# Patient Record
Sex: FEMALE | Race: WHITE | Employment: OTHER | URBAN - NONMETROPOLITAN AREA
[De-identification: names, ages, dates, MRNs, and addresses within clinical notes are randomized per-mention and may not be internally consistent; named-entity substitution may affect disease eponyms.]

---

## 2017-01-11 ENCOUNTER — OFFICE VISIT (OUTPATIENT)
Dept: ORTHOPEDIC SURGERY | Age: 77
End: 2017-01-11

## 2017-01-11 VITALS — WEIGHT: 124 LBS | BODY MASS INDEX: 21.97 KG/M2 | HEIGHT: 63 IN

## 2017-01-11 DIAGNOSIS — M17.0 PRIMARY OSTEOARTHRITIS OF BOTH KNEES: Primary | ICD-10-CM

## 2017-01-11 PROCEDURE — 99213 OFFICE O/P EST LOW 20 MIN: CPT | Performed by: ORTHOPAEDIC SURGERY

## 2017-01-16 ENCOUNTER — TELEPHONE (OUTPATIENT)
Dept: CARDIOLOGY CLINIC | Age: 77
End: 2017-01-16

## 2017-01-26 ENCOUNTER — TELEPHONE (OUTPATIENT)
Dept: CARDIOLOGY CLINIC | Age: 77
End: 2017-01-26

## 2017-02-08 ENCOUNTER — OFFICE VISIT (OUTPATIENT)
Dept: ORTHOPEDIC SURGERY | Age: 77
End: 2017-02-08

## 2017-02-08 VITALS — HEIGHT: 63 IN | BODY MASS INDEX: 22.32 KG/M2 | WEIGHT: 126 LBS

## 2017-02-08 DIAGNOSIS — M17.12 PRIMARY OSTEOARTHRITIS OF LEFT KNEE: Primary | ICD-10-CM

## 2017-02-08 PROCEDURE — 20610 DRAIN/INJ JOINT/BURSA W/O US: CPT | Performed by: ORTHOPAEDIC SURGERY

## 2017-02-08 PROCEDURE — 99213 OFFICE O/P EST LOW 20 MIN: CPT | Performed by: ORTHOPAEDIC SURGERY

## 2017-02-08 PROCEDURE — 1090F PRES/ABSN URINE INCON ASSESS: CPT | Performed by: ORTHOPAEDIC SURGERY

## 2017-02-08 PROCEDURE — 1036F TOBACCO NON-USER: CPT | Performed by: ORTHOPAEDIC SURGERY

## 2017-02-08 PROCEDURE — G8419 CALC BMI OUT NRM PARAM NOF/U: HCPCS | Performed by: ORTHOPAEDIC SURGERY

## 2017-02-08 PROCEDURE — G8427 DOCREV CUR MEDS BY ELIG CLIN: HCPCS | Performed by: ORTHOPAEDIC SURGERY

## 2017-02-08 PROCEDURE — 4040F PNEUMOC VAC/ADMIN/RCVD: CPT | Performed by: ORTHOPAEDIC SURGERY

## 2017-02-08 PROCEDURE — 1123F ACP DISCUSS/DSCN MKR DOCD: CPT | Performed by: ORTHOPAEDIC SURGERY

## 2017-02-08 PROCEDURE — G8484 FLU IMMUNIZE NO ADMIN: HCPCS | Performed by: ORTHOPAEDIC SURGERY

## 2017-02-08 PROCEDURE — G8400 PT W/DXA NO RESULTS DOC: HCPCS | Performed by: ORTHOPAEDIC SURGERY

## 2017-02-09 ENCOUNTER — TELEPHONE (OUTPATIENT)
Dept: ORTHOPEDIC SURGERY | Age: 77
End: 2017-02-09

## 2017-03-03 ENCOUNTER — OFFICE VISIT (OUTPATIENT)
Dept: CARDIOLOGY CLINIC | Age: 77
End: 2017-03-03

## 2017-03-03 VITALS
HEART RATE: 80 BPM | BODY MASS INDEX: 22.68 KG/M2 | SYSTOLIC BLOOD PRESSURE: 134 MMHG | HEIGHT: 63 IN | WEIGHT: 128 LBS | DIASTOLIC BLOOD PRESSURE: 70 MMHG

## 2017-03-03 DIAGNOSIS — Q21.10 ASD (ATRIAL SEPTAL DEFECT): ICD-10-CM

## 2017-03-03 DIAGNOSIS — D68.51 FACTOR V LEIDEN (HCC): ICD-10-CM

## 2017-03-03 DIAGNOSIS — E78.5 HYPERLIPIDEMIA, UNSPECIFIED HYPERLIPIDEMIA TYPE: Primary | Chronic | ICD-10-CM

## 2017-03-03 DIAGNOSIS — G45.1 HEMISPHERIC CAROTID ARTERY SYNDROME: ICD-10-CM

## 2017-03-03 PROCEDURE — 4040F PNEUMOC VAC/ADMIN/RCVD: CPT | Performed by: INTERNAL MEDICINE

## 2017-03-03 PROCEDURE — 1090F PRES/ABSN URINE INCON ASSESS: CPT | Performed by: INTERNAL MEDICINE

## 2017-03-03 PROCEDURE — G8484 FLU IMMUNIZE NO ADMIN: HCPCS | Performed by: INTERNAL MEDICINE

## 2017-03-03 PROCEDURE — 1123F ACP DISCUSS/DSCN MKR DOCD: CPT | Performed by: INTERNAL MEDICINE

## 2017-03-03 PROCEDURE — G8427 DOCREV CUR MEDS BY ELIG CLIN: HCPCS | Performed by: INTERNAL MEDICINE

## 2017-03-03 PROCEDURE — G8419 CALC BMI OUT NRM PARAM NOF/U: HCPCS | Performed by: INTERNAL MEDICINE

## 2017-03-03 PROCEDURE — 99214 OFFICE O/P EST MOD 30 MIN: CPT | Performed by: INTERNAL MEDICINE

## 2017-03-03 PROCEDURE — G8400 PT W/DXA NO RESULTS DOC: HCPCS | Performed by: INTERNAL MEDICINE

## 2017-03-03 PROCEDURE — 1036F TOBACCO NON-USER: CPT | Performed by: INTERNAL MEDICINE

## 2017-03-03 RX ORDER — CHOLECALCIFEROL (VITAMIN D3) 125 MCG
2.5 CAPSULE ORAL DAILY
COMMUNITY
End: 2017-05-26

## 2017-03-13 ENCOUNTER — TELEPHONE (OUTPATIENT)
Dept: CARDIOLOGY CLINIC | Age: 77
End: 2017-03-13

## 2017-03-24 ENCOUNTER — TELEPHONE (OUTPATIENT)
Dept: CARDIOLOGY CLINIC | Age: 77
End: 2017-03-24

## 2017-04-19 ENCOUNTER — OFFICE VISIT (OUTPATIENT)
Dept: ORTHOPEDIC SURGERY | Age: 77
End: 2017-04-19

## 2017-04-19 VITALS — BODY MASS INDEX: 22.68 KG/M2 | HEIGHT: 63 IN | WEIGHT: 128 LBS

## 2017-04-19 DIAGNOSIS — M25.561 ACUTE PAIN OF RIGHT KNEE: Primary | ICD-10-CM

## 2017-04-19 DIAGNOSIS — M17.0 PRIMARY OSTEOARTHRITIS OF BOTH KNEES: ICD-10-CM

## 2017-04-19 PROCEDURE — 1036F TOBACCO NON-USER: CPT | Performed by: ORTHOPAEDIC SURGERY

## 2017-04-19 PROCEDURE — 4040F PNEUMOC VAC/ADMIN/RCVD: CPT | Performed by: ORTHOPAEDIC SURGERY

## 2017-04-19 PROCEDURE — G8427 DOCREV CUR MEDS BY ELIG CLIN: HCPCS | Performed by: ORTHOPAEDIC SURGERY

## 2017-04-19 PROCEDURE — G8419 CALC BMI OUT NRM PARAM NOF/U: HCPCS | Performed by: ORTHOPAEDIC SURGERY

## 2017-04-19 PROCEDURE — 1090F PRES/ABSN URINE INCON ASSESS: CPT | Performed by: ORTHOPAEDIC SURGERY

## 2017-04-19 PROCEDURE — 99213 OFFICE O/P EST LOW 20 MIN: CPT | Performed by: ORTHOPAEDIC SURGERY

## 2017-04-19 PROCEDURE — 1123F ACP DISCUSS/DSCN MKR DOCD: CPT | Performed by: ORTHOPAEDIC SURGERY

## 2017-04-19 PROCEDURE — 20610 DRAIN/INJ JOINT/BURSA W/O US: CPT | Performed by: ORTHOPAEDIC SURGERY

## 2017-04-19 PROCEDURE — G8400 PT W/DXA NO RESULTS DOC: HCPCS | Performed by: ORTHOPAEDIC SURGERY

## 2017-04-26 ENCOUNTER — OFFICE VISIT (OUTPATIENT)
Dept: ORTHOPEDIC SURGERY | Age: 77
End: 2017-04-26

## 2017-04-26 DIAGNOSIS — M79.671 RIGHT FOOT PAIN: Primary | ICD-10-CM

## 2017-04-26 PROCEDURE — 99214 OFFICE O/P EST MOD 30 MIN: CPT | Performed by: ORTHOPAEDIC SURGERY

## 2017-04-26 PROCEDURE — 1090F PRES/ABSN URINE INCON ASSESS: CPT | Performed by: ORTHOPAEDIC SURGERY

## 2017-04-26 PROCEDURE — 73630 X-RAY EXAM OF FOOT: CPT | Performed by: ORTHOPAEDIC SURGERY

## 2017-04-26 PROCEDURE — 4040F PNEUMOC VAC/ADMIN/RCVD: CPT | Performed by: ORTHOPAEDIC SURGERY

## 2017-04-26 PROCEDURE — 1123F ACP DISCUSS/DSCN MKR DOCD: CPT | Performed by: ORTHOPAEDIC SURGERY

## 2017-04-26 PROCEDURE — G8427 DOCREV CUR MEDS BY ELIG CLIN: HCPCS | Performed by: ORTHOPAEDIC SURGERY

## 2017-04-26 PROCEDURE — G8400 PT W/DXA NO RESULTS DOC: HCPCS | Performed by: ORTHOPAEDIC SURGERY

## 2017-04-26 PROCEDURE — 1036F TOBACCO NON-USER: CPT | Performed by: ORTHOPAEDIC SURGERY

## 2017-04-26 PROCEDURE — G8419 CALC BMI OUT NRM PARAM NOF/U: HCPCS | Performed by: ORTHOPAEDIC SURGERY

## 2017-04-26 RX ORDER — TRAZODONE HYDROCHLORIDE 50 MG/1
100 TABLET ORAL NIGHTLY
COMMUNITY
Start: 2017-04-24

## 2017-04-26 RX ORDER — ZOLPIDEM TARTRATE 5 MG/1
TABLET ORAL
COMMUNITY
Start: 2017-03-24 | End: 2017-05-26 | Stop reason: HOSPADM

## 2017-04-26 RX ORDER — CITALOPRAM 20 MG/1
TABLET ORAL
COMMUNITY
Start: 2017-02-10 | End: 2017-05-26 | Stop reason: HOSPADM

## 2017-05-17 ENCOUNTER — OFFICE VISIT (OUTPATIENT)
Dept: ORTHOPEDIC SURGERY | Age: 77
End: 2017-05-17

## 2017-05-17 VITALS — BODY MASS INDEX: 22.68 KG/M2 | WEIGHT: 128 LBS | HEIGHT: 63 IN

## 2017-05-17 DIAGNOSIS — M17.0 PRIMARY OSTEOARTHRITIS OF BOTH KNEES: Primary | ICD-10-CM

## 2017-05-17 PROCEDURE — 99213 OFFICE O/P EST LOW 20 MIN: CPT | Performed by: ORTHOPAEDIC SURGERY

## 2017-05-17 PROCEDURE — G8400 PT W/DXA NO RESULTS DOC: HCPCS | Performed by: ORTHOPAEDIC SURGERY

## 2017-05-17 PROCEDURE — 1123F ACP DISCUSS/DSCN MKR DOCD: CPT | Performed by: ORTHOPAEDIC SURGERY

## 2017-05-17 PROCEDURE — G8427 DOCREV CUR MEDS BY ELIG CLIN: HCPCS | Performed by: ORTHOPAEDIC SURGERY

## 2017-05-17 PROCEDURE — G8419 CALC BMI OUT NRM PARAM NOF/U: HCPCS | Performed by: ORTHOPAEDIC SURGERY

## 2017-05-17 PROCEDURE — 4040F PNEUMOC VAC/ADMIN/RCVD: CPT | Performed by: ORTHOPAEDIC SURGERY

## 2017-05-17 PROCEDURE — 1036F TOBACCO NON-USER: CPT | Performed by: ORTHOPAEDIC SURGERY

## 2017-05-17 PROCEDURE — 1090F PRES/ABSN URINE INCON ASSESS: CPT | Performed by: ORTHOPAEDIC SURGERY

## 2017-05-30 ENCOUNTER — OFFICE VISIT (OUTPATIENT)
Dept: CARDIOLOGY CLINIC | Age: 77
End: 2017-05-30

## 2017-05-30 VITALS
HEART RATE: 64 BPM | HEIGHT: 64 IN | BODY MASS INDEX: 21.51 KG/M2 | DIASTOLIC BLOOD PRESSURE: 70 MMHG | WEIGHT: 126 LBS | SYSTOLIC BLOOD PRESSURE: 118 MMHG

## 2017-05-30 DIAGNOSIS — G45.1 HEMISPHERIC CAROTID ARTERY SYNDROME: ICD-10-CM

## 2017-05-30 DIAGNOSIS — D68.51 FACTOR V LEIDEN (HCC): ICD-10-CM

## 2017-05-30 DIAGNOSIS — E78.5 HYPERLIPIDEMIA, UNSPECIFIED HYPERLIPIDEMIA TYPE: Primary | Chronic | ICD-10-CM

## 2017-05-30 PROCEDURE — G8427 DOCREV CUR MEDS BY ELIG CLIN: HCPCS | Performed by: INTERNAL MEDICINE

## 2017-05-30 PROCEDURE — 99214 OFFICE O/P EST MOD 30 MIN: CPT | Performed by: INTERNAL MEDICINE

## 2017-05-30 PROCEDURE — 4040F PNEUMOC VAC/ADMIN/RCVD: CPT | Performed by: INTERNAL MEDICINE

## 2017-05-30 PROCEDURE — 1123F ACP DISCUSS/DSCN MKR DOCD: CPT | Performed by: INTERNAL MEDICINE

## 2017-05-30 PROCEDURE — G8400 PT W/DXA NO RESULTS DOC: HCPCS | Performed by: INTERNAL MEDICINE

## 2017-05-30 PROCEDURE — G8598 ASA/ANTIPLAT THER USED: HCPCS | Performed by: INTERNAL MEDICINE

## 2017-05-30 PROCEDURE — G8419 CALC BMI OUT NRM PARAM NOF/U: HCPCS | Performed by: INTERNAL MEDICINE

## 2017-05-30 PROCEDURE — 1090F PRES/ABSN URINE INCON ASSESS: CPT | Performed by: INTERNAL MEDICINE

## 2017-05-30 PROCEDURE — 1036F TOBACCO NON-USER: CPT | Performed by: INTERNAL MEDICINE

## 2017-05-31 ENCOUNTER — OFFICE VISIT (OUTPATIENT)
Dept: ORTHOPEDIC SURGERY | Age: 77
End: 2017-05-31

## 2017-05-31 VITALS — WEIGHT: 126 LBS | HEIGHT: 64 IN | BODY MASS INDEX: 21.51 KG/M2

## 2017-05-31 DIAGNOSIS — M17.11 PRIMARY OSTEOARTHRITIS OF RIGHT KNEE: Primary | ICD-10-CM

## 2017-05-31 PROCEDURE — 4040F PNEUMOC VAC/ADMIN/RCVD: CPT | Performed by: ORTHOPAEDIC SURGERY

## 2017-05-31 PROCEDURE — G8598 ASA/ANTIPLAT THER USED: HCPCS | Performed by: ORTHOPAEDIC SURGERY

## 2017-05-31 PROCEDURE — 20610 DRAIN/INJ JOINT/BURSA W/O US: CPT | Performed by: ORTHOPAEDIC SURGERY

## 2017-05-31 PROCEDURE — G8427 DOCREV CUR MEDS BY ELIG CLIN: HCPCS | Performed by: ORTHOPAEDIC SURGERY

## 2017-05-31 PROCEDURE — 1123F ACP DISCUSS/DSCN MKR DOCD: CPT | Performed by: ORTHOPAEDIC SURGERY

## 2017-05-31 PROCEDURE — 99214 OFFICE O/P EST MOD 30 MIN: CPT | Performed by: ORTHOPAEDIC SURGERY

## 2017-05-31 PROCEDURE — 1036F TOBACCO NON-USER: CPT | Performed by: ORTHOPAEDIC SURGERY

## 2017-05-31 PROCEDURE — G8400 PT W/DXA NO RESULTS DOC: HCPCS | Performed by: ORTHOPAEDIC SURGERY

## 2017-05-31 PROCEDURE — G8419 CALC BMI OUT NRM PARAM NOF/U: HCPCS | Performed by: ORTHOPAEDIC SURGERY

## 2017-05-31 PROCEDURE — 1090F PRES/ABSN URINE INCON ASSESS: CPT | Performed by: ORTHOPAEDIC SURGERY

## 2017-06-06 ENCOUNTER — HOSPITAL ENCOUNTER (OUTPATIENT)
Dept: VASCULAR LAB | Age: 77
Discharge: HOME | End: 2017-06-06
Attending: INTERNAL MEDICINE | Admitting: INTERNAL MEDICINE

## 2017-06-06 DIAGNOSIS — G45.1 HEMISPHERIC CAROTID ARTERY SYNDROME: ICD-10-CM

## 2017-06-06 DIAGNOSIS — G45.1 CAROTID ARTERY SYNDROME HEMISPHERIC: ICD-10-CM

## 2017-06-14 ENCOUNTER — TELEPHONE (OUTPATIENT)
Dept: CARDIOLOGY CLINIC | Age: 77
End: 2017-06-14

## 2017-08-16 ENCOUNTER — OFFICE VISIT (OUTPATIENT)
Dept: ORTHOPEDIC SURGERY | Age: 77
End: 2017-08-16

## 2017-08-16 VITALS — WEIGHT: 126 LBS | BODY MASS INDEX: 21.51 KG/M2 | HEIGHT: 64 IN

## 2017-08-16 DIAGNOSIS — G89.29 CHRONIC PAIN OF RIGHT KNEE: Primary | ICD-10-CM

## 2017-08-16 DIAGNOSIS — M25.561 CHRONIC PAIN OF RIGHT KNEE: Primary | ICD-10-CM

## 2017-08-16 PROCEDURE — G8598 ASA/ANTIPLAT THER USED: HCPCS | Performed by: ORTHOPAEDIC SURGERY

## 2017-08-16 PROCEDURE — 1123F ACP DISCUSS/DSCN MKR DOCD: CPT | Performed by: ORTHOPAEDIC SURGERY

## 2017-08-16 PROCEDURE — G8400 PT W/DXA NO RESULTS DOC: HCPCS | Performed by: ORTHOPAEDIC SURGERY

## 2017-08-16 PROCEDURE — 1090F PRES/ABSN URINE INCON ASSESS: CPT | Performed by: ORTHOPAEDIC SURGERY

## 2017-08-16 PROCEDURE — 1036F TOBACCO NON-USER: CPT | Performed by: ORTHOPAEDIC SURGERY

## 2017-08-16 PROCEDURE — G8427 DOCREV CUR MEDS BY ELIG CLIN: HCPCS | Performed by: ORTHOPAEDIC SURGERY

## 2017-08-16 PROCEDURE — 4040F PNEUMOC VAC/ADMIN/RCVD: CPT | Performed by: ORTHOPAEDIC SURGERY

## 2017-08-16 PROCEDURE — G8420 CALC BMI NORM PARAMETERS: HCPCS | Performed by: ORTHOPAEDIC SURGERY

## 2017-08-16 PROCEDURE — 99214 OFFICE O/P EST MOD 30 MIN: CPT | Performed by: ORTHOPAEDIC SURGERY

## 2017-08-16 PROCEDURE — 20610 DRAIN/INJ JOINT/BURSA W/O US: CPT | Performed by: ORTHOPAEDIC SURGERY

## 2017-10-04 ENCOUNTER — OFFICE VISIT (OUTPATIENT)
Dept: ORTHOPEDIC SURGERY | Age: 77
End: 2017-10-04

## 2017-10-04 VITALS — HEIGHT: 64 IN | BODY MASS INDEX: 21.51 KG/M2 | WEIGHT: 126 LBS

## 2017-10-04 DIAGNOSIS — M17.11 PRIMARY OSTEOARTHRITIS OF RIGHT KNEE: Primary | ICD-10-CM

## 2017-10-04 PROCEDURE — G8598 ASA/ANTIPLAT THER USED: HCPCS | Performed by: ORTHOPAEDIC SURGERY

## 2017-10-04 PROCEDURE — 1036F TOBACCO NON-USER: CPT | Performed by: ORTHOPAEDIC SURGERY

## 2017-10-04 PROCEDURE — G8400 PT W/DXA NO RESULTS DOC: HCPCS | Performed by: ORTHOPAEDIC SURGERY

## 2017-10-04 PROCEDURE — 4040F PNEUMOC VAC/ADMIN/RCVD: CPT | Performed by: ORTHOPAEDIC SURGERY

## 2017-10-04 PROCEDURE — G8420 CALC BMI NORM PARAMETERS: HCPCS | Performed by: ORTHOPAEDIC SURGERY

## 2017-10-04 PROCEDURE — 20610 DRAIN/INJ JOINT/BURSA W/O US: CPT | Performed by: ORTHOPAEDIC SURGERY

## 2017-10-04 PROCEDURE — 99214 OFFICE O/P EST MOD 30 MIN: CPT | Performed by: ORTHOPAEDIC SURGERY

## 2017-10-04 PROCEDURE — 1090F PRES/ABSN URINE INCON ASSESS: CPT | Performed by: ORTHOPAEDIC SURGERY

## 2017-10-04 PROCEDURE — G8427 DOCREV CUR MEDS BY ELIG CLIN: HCPCS | Performed by: ORTHOPAEDIC SURGERY

## 2017-10-04 PROCEDURE — 1123F ACP DISCUSS/DSCN MKR DOCD: CPT | Performed by: ORTHOPAEDIC SURGERY

## 2017-10-04 PROCEDURE — G8484 FLU IMMUNIZE NO ADMIN: HCPCS | Performed by: ORTHOPAEDIC SURGERY

## 2017-10-04 NOTE — PROGRESS NOTES
joint effusion    Range of Motion:  0 - 140° flexion/ extension   Hip extension to 20 hip flexion to 70+  Lumbar ROM -20 extension flexion to 6 inches from the floor      Strength: Quadriceps testing 5/5 , hamstring muscle testing 5/5, EHL against resistance is 5/5, hip flexor strength is intact 5/5, internal and external rotation of the hip against resistance is also intact 5/5    Special Tests: stable Lachman, negative anterior drawer, negative pivot shift, no posterior sag no posterior drawer does not open to valgus or varus stress at 0 or 30° negative, Steinmann's negative, Radha's negative, Homans negative Dm, pedal pulses are +2/4 capillary refill is brisk sensation is intact ankle exam and hip exam are shows no pain with full range of motion provocative testing of the hip is nonpainful muscle testing around the hip is 5 over 5. Lumbar flexion to 6 inches from floor with out pain      Inspection:      Gait: antalgic     Reflex:    Lower extremity Deep tendon reflexes +2/4 and equal bilaterally for patella and Achilles  Upper extremity reflexes:  of the biceps, triceps, brachioradialis +2/4 equal bilaterally    Contralateral  Knee: Negative Lachman negative anterior drawer negative pivot shift no posterior sag no posterior drawer does not open to valgus or varus stress at 0 or 30° negative Steinmann's negative Radha's negative Homans negative Dm pedal pulses are +2/4 capillary refill is brisk sensation is intact ankle exam and hip exam are shows no pain with full range of motion provocative testing of the hip is nonpainful muscle testing around the hip is 5 over 5. Hip and lumbar testing does not reproduce pain evocative testing does not reproduce symptomatology. Additional Examinations:  Left Lower Extremity: Examination of the left lower extremity does not show any tenderness, deformity or injury. Range of motion is unremarkable. There is no gross instability.   There are no rashes, ulcerations or lesions. Strength and tone are normal.  Thoracic Spine: Examination of the thoracic spine does not show any tenderness, deformity or injury. Range of motion is unremarkable. There is no gross instability. There are no rashes, ulcerations or lesions. Strength and tone are normal.  Lower Back: Examination of the lower back does not show any tenderness, deformity or injury. Range of motion is unremarkable. There is no gross instability. There are no rashes, ulcerations or lesions. Strength and tone are normal.    Past Surgical History:   Procedure Laterality Date    CARPAL TUNNEL RELEASE      right    COLONOSCOPY      COSMETIC SURGERY      eye lids lifted about 10 years ago.  HIP SURGERY Right 4/21/15.  KNEE ARTHROSCOPY Bilateral     OTHER SURGICAL HISTORY Right     Gamma nail right hip    TONSILLECTOMY     . Assessment :  Osteoarthritis right knee    Impression: Osteoarthritis right knee    Office Procedures:I discussed in detail the risks, benefits and complications of an injection which included but are not limited to infection, skin reactions, hot swollen joint, and anaphylaxis with the patient. The patient verbalized understanding and gave informed consent for the injection. The patient's skin was prepped using sterile alcohol solution. A sterile 22-gauge needle was inserted into the right knee and a mixture of 3ml of 6mg/ml Celestone, 7mL of 0.5% Marcaine  was injected under sterile technique. The needle was withdrawn and the puncture site sealed with a Band-Aid. The patient tolerated the injection well. The patient was instructed to call the office immediately if there is any pain, redness, warmth, fever, or chills.   Orders Placed This Encounter   Procedures    IN BETAMETHASONE ACET&SOD PHOSP    IN ARTHROCENTESIS ASPIR&/INJ MAJOR JT/BURSA W/O US       Previous Treatments:  Knee arthroscopy, injections, physical therapy, anti-inflammatories, ice,    Possible

## 2017-10-04 NOTE — MR AVS SNAPSHOT
After Visit Summary             Lakeisha Ayala   10/4/2017 2:30 PM   Office Visit    Description:  Female : 1940   Provider:  Michelle Stratton DO   Department:  [facility]              Your Follow-Up and Future Appointments         Below is a list of your follow-up and future appointments. This may not be a complete list as you may have made appointments directly with providers that we are not aware of or your providers may have made some for you. Please call your providers to confirm appointments. It is important to keep your appointments. Please bring your current insurance card, photo ID, co-pay, and all medication bottles to your appointment. If self-pay, payment is expected at the time of service. Your To-Do List     Future Appointments Provider Department Dept Phone    2017 11:30 AM Tanna Chinchilla MD; Katarzyna Blake [facility] [phone]    2017 1:30 PM Oneyda Lucero MD [facility] Cardiology [facility] [phone]    Please arrive 15 minutes prior to appointment, bring photo ID and insurance card.          Information from Your Visit        Department     Name Address Phone Fax    [address] [phone]      You Were Seen for:         Comments    Primary osteoarthritis of right knee   [[id number]]         Vital Signs     Height Weight Body Mass Index Smoking Status          5' 4\" (1.626 m) 126 lb (57.2 kg) 21.63 kg/m2 Never Smoker           Medications and Orders      Your Current Medications Are              traZODone (DESYREL) 50 MG tablet     rivaroxaban (XARELTO) 20 MG TABS tablet Take 1 tablet by mouth daily    Calcium Carb-Cholecalciferol 500-400 MG-UNIT TABS Take 1 tablet by mouth daily    Multiple Vitamins-Minerals (THERAPEUTIC MULTIVITAMIN-MINERALS) tablet Take 1 tablet by mouth daily    LORazepam (ATIVAN) 0.5 MG tablet Take 0.5 mg by mouth daily alendronate (FOSAMAX) 70 MG tablet Take 70 mg by mouth every 7 days Takes on Saturday    Omega-3 Fatty Acids (FISH OIL) 1200 MG CAPS Take 1 tablet by mouth daily. pravastatin (PRAVACHOL) 80 MG tablet Take 80 mg by mouth daily. Coenzyme Q10-Levocarnitine (CO Q-10 PLUS PO) Take 100 mg by mouth daily     citalopram (CELEXA) 10 MG tablet Take 5 mg by mouth daily     vitamin D (CHOLECALCIFEROL) 400 UNIT TABS tablet Take 5,000 Units by mouth daily. Allergies              Flagyl [Metronidazole] Diarrhea    Omnicef Diarrhea    Cephalexin Diarrhea    Penicillins Hives      We Ordered/Performed the following           ID ARTHROCENTESIS ASPIR&/INJ MAJOR JT/BURSA W/O US     ID BETAMETHASONE ACET&SOD PHOSP          Additional Information        Basic Information     Date Of Birth Sex Race Ethnicity Preferred Language    1940 Female White Non-/Non  English      Problem List as of 10/4/2017  Date Reviewed: 10/4/2017                Left knee pain    Knee pain    Primary osteoarthritis of both knees    Long term (current) use of anticoagulants    Right knee DJD    Intertrochanteric fracture of right femur (HCC)    Closed right hip fracture (HCC)    Hyperlipidemia (Chronic)    TIA (transient ischemic attack)    Factor V Leiden ([facility] [address]      Immunizations as of 10/4/2017     Name Date    Influenza Virus Vaccine 9/22/2014    Influenza Whole 10/4/2009    Pneumococcal Conjugate 7-valent 5/4/2007      Preventive Care        Date Due    Tetanus Combination Vaccine (1 - Tdap) 9/27/1957    Zoster Vaccine 9/27/1998    Osteoporosis screening or a bone density scan (Dexa) is recommended once at age 72. Based upon the results and risk factors for bone loss, your provider will recommend whether this needs to be repeated.  9/27/2003    Pneumococcal Vaccines (two) for all adults aged 72 and over (1 of 2 - PCV13) 9/27/2003    Yearly Flu Vaccine (1) 9/1/2017    Cholesterol Screening 10/31/2019            MyChart Signup Our records indicate that you have declined MyChart signup.

## 2017-11-22 ENCOUNTER — OFFICE VISIT (OUTPATIENT)
Dept: CARDIOLOGY CLINIC | Age: 77
End: 2017-11-22

## 2017-11-22 VITALS
HEIGHT: 64 IN | SYSTOLIC BLOOD PRESSURE: 126 MMHG | HEART RATE: 76 BPM | DIASTOLIC BLOOD PRESSURE: 80 MMHG | BODY MASS INDEX: 22.48 KG/M2 | WEIGHT: 131.7 LBS

## 2017-11-22 DIAGNOSIS — G45.1 HEMISPHERIC CAROTID ARTERY SYNDROME: ICD-10-CM

## 2017-11-22 DIAGNOSIS — Q21.10 ASD (ATRIAL SEPTAL DEFECT): ICD-10-CM

## 2017-11-22 DIAGNOSIS — D68.51 FACTOR V LEIDEN (HCC): ICD-10-CM

## 2017-11-22 DIAGNOSIS — E78.5 HYPERLIPIDEMIA, UNSPECIFIED HYPERLIPIDEMIA TYPE: Primary | ICD-10-CM

## 2017-11-22 PROCEDURE — 1123F ACP DISCUSS/DSCN MKR DOCD: CPT | Performed by: INTERNAL MEDICINE

## 2017-11-22 PROCEDURE — 99214 OFFICE O/P EST MOD 30 MIN: CPT | Performed by: INTERNAL MEDICINE

## 2017-11-22 PROCEDURE — 1090F PRES/ABSN URINE INCON ASSESS: CPT | Performed by: INTERNAL MEDICINE

## 2017-11-22 PROCEDURE — G8427 DOCREV CUR MEDS BY ELIG CLIN: HCPCS | Performed by: INTERNAL MEDICINE

## 2017-11-22 PROCEDURE — 1036F TOBACCO NON-USER: CPT | Performed by: INTERNAL MEDICINE

## 2017-11-22 PROCEDURE — G8400 PT W/DXA NO RESULTS DOC: HCPCS | Performed by: INTERNAL MEDICINE

## 2017-11-22 PROCEDURE — 4040F PNEUMOC VAC/ADMIN/RCVD: CPT | Performed by: INTERNAL MEDICINE

## 2017-11-22 PROCEDURE — G8484 FLU IMMUNIZE NO ADMIN: HCPCS | Performed by: INTERNAL MEDICINE

## 2017-11-22 PROCEDURE — G8420 CALC BMI NORM PARAMETERS: HCPCS | Performed by: INTERNAL MEDICINE

## 2017-11-22 NOTE — LETTER
[address] Cardiology - [place] [address] [place] [address] [address]  Phone: [phone]  Fax: [phone]    Ray James MD        November 22, 2017     [address]  [facility]. [place] [address]    Patient: Dahiana Herring  MR Number: [id number]  YOB: 1940  Date of Visit: 11/22/2017    Dear  [address]:    Thank you for the request for consultation for Barbie Fraser to me for the evaluation of . Below are the relevant portions of my assessment and plan of care. Cardiac Follow Up    Referring Provider:  [address] [place] [address]     Chief Complaint   Patient presents with    6 Month Follow-Up    Hypertension        History of Present Illness:  Sunnie Sandifer is a 76 y.o. female here in follow up. She has seen my partner, Dr. Bayron Mistry, in the past. As you recall, she was seen as a new patient at the request of Dr. Bossman Powers for concerns of ASD noted in 1996. 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 Fritz Garcia in hematology. She also has a history of TIA, and hyperlipidemia. In 1996, she had an episode of blurry eye sight with hand numbness that was diagnosed as a complicated migraine. She had an echo with bubble study April 4, 2014 - LV normal with LVEF 10-09%, diastolic dysfunction, trace MR, and moderate TR. Sunnie Sandifer was admitted to [facility] 10/30/14 with slurred speech, difficulty finding words, and transient headaches. She was transferred to [facility] and evaluated by oncology, neurology, and cardiology. She thinks the cause for symptoms was likely because of sub therapeutic INR and TIA. Dr. Fritz Garcia is now following her INR. She had an echo with bubble study 1/18/3016 that did not document an ASD. In 2015 she fell and fractured her hip and had surgery. Sunnie Sandifer reports having TIA's from time to time.  In June 2016, she was on the phone with her son who lives in [facility] and she couldn't put two words together. She ended up being admitted to [facility] for three days with a TIA. She was on coumadin at the time with therapeutic INR's. Her neurologist is Dr. Kevin Garcia. ARIN done in 12/27/16 showed no evidence of shunting, mild MR. At our last visit in March, we switched her Coumadin to Xarelto. She has also since been taken off of asa. Today, Yael Nath feels well. She is tolerating Xarelto without bleeding or excessive bruising. She denies exertional chest pain, SOB/CHOUDHURY, PND, light-headedness, or edema. Her son is here for visit today. Past Medical History:   has a past medical history of Allergic; Arthritis; Depression; DVT (deep venous thrombosis) ([address] HTN (hypertension), benign; Hyperlipidemia; Movement disorder; New onset seizure ([address] Unspecified cerebral artery occlusion with cerebral infarction; Unspecified diseases of blood and blood-forming organs; and Vitamin D deficiency. Surgical History:   has a past surgical history that includes Carpal tunnel release; Knee arthroscopy (Bilateral); Colonoscopy; Tonsillectomy; Cosmetic surgery; other surgical history (Right); and hip surgery (Right, 4/21/15.). Social History:   reports that she has never smoked. She has never used smokeless tobacco. She reports that she does not drink alcohol or use drugs. Family History:  family history includes Arthritis in her mother; Asthma in her father; Depression in her mother; Heart Disease in her brother, father, and sister; Stroke in her mother.      Home medications:    Current Outpatient Prescriptions:     traZODone (DESYREL) 50 MG tablet, , Disp: , Rfl:     rivaroxaban (XARELTO) 20 MG TABS tablet, Take 1 tablet by mouth daily, Disp: 90 tablet, Rfl: 3    Calcium Carb-Cholecalciferol 500-400 MG-UNIT TABS, Take 1 tablet by mouth daily, Disp: , Rfl:     Multiple Vitamins-Minerals (THERAPEUTIC MULTIVITAMIN-MINERALS) tablet, · Allergic/Immunologic: No nasal congestion or hives. Physical Examination:    Vitals:    11/22/17 1357   BP: 126/80   Pulse: 76        Constitutional and General Appearance: NAD, pleasant  Respiratory:  · Normal excursion and expansion without use of accessory muscles  · Resp Auscultation: Normal breath sounds without dullness  Cardiovascular:  · The apical impulses not displaced  · Heart tones are crisp and normal  · Cervical veins are not engorged  · The carotid upstroke is normal in amplitude and contour without delay or bruit  · Normal S1S2, No S3, no murmur  · Peripheral pulses are symmetrical and full  · There is no clubbing, cyanosis of the extremities. · No edema. · Femoral Arteries: 2+ and equal  · Pedal Pulses: 2+ and equal   Abdomen:  · No masses or tenderness  · Liver/Spleen: No Abnormalities Noted  Neurological/Psychiatric:  · Alert and oriented in all spheres  · Moves all extremities well  · Exhibits normal gait balance and coordination  · No abnormalities of mood, affect, memory, mentation, or behavior are noted    ARIN 12/27/16  Summary   Mild mitral regurgitation is present.   A bubble study was performed and showed no evidence of shunting. No evidence   to support presence of a PFO    1/2016 Echo Bubble Study---  Mild concentric left ventricular hypertrophy is present. Overall left ventricular function is normal.  Ejection fraction is visually estimated to be 55-60%. There is reversal of  E/A inflow velocities across the mitral valve suggesting impaired left ventricular relaxation. Severe calcification of the posterior mitral valve annulus. A bubble study was performed and fails to show evidence of shunting. Right heart size is borderline enlarged with normal RV function. Assessment:   1. ASD (atrial septal defect) - Echo 10/2014: EF 55-60% no evidence for shunting, RVSP 53 mmHg. Bubble study Echo 4/2014 does not document ASD.  1/2016 bubble study echo does not document ASD.

## 2017-11-22 NOTE — PROGRESS NOTES
Take 5,000 Units by mouth daily. , Disp: , Rfl:         Allergies:  Flagyl [metronidazole]; Omnicef; Cephalexin; and Penicillins     Review of Systems:   · Constitutional: there has been no unanticipated weight loss. There's been no change in energy level, sleep pattern, or activity level. · Eyes: No visual changes or diplopia. No scleral icterus. · ENT: No Headaches, hearing loss or vertigo. No mouth sores or sore throat. · Cardiovascular: Reviewed in HPI  · Respiratory: No cough or wheezing, no sputum production. No hematemesis. · Gastrointestinal: No abdominal pain, appetite loss, blood in stools. No change in bowel or bladder habits. · Genitourinary: No dysuria, trouble voiding, or hematuria. · Musculoskeletal:  No gait disturbance, weakness or joint complaints. · Integumentary: No rash or pruritis. · Neurological: No headache, diplopia, change in muscle strength, numbness or tingling. No change in gait, balance, coordination, mood, affect, memory, mentation, behavior. · Psychiatric: No anxiety, no depression. · Endocrine: No malaise, fatigue or temperature intolerance. No excessive thirst, fluid intake, or urination. No tremor. · Hematologic/Lymphatic: No abnormal bruising or bleeding, blood clots or swollen lymph nodes. · Allergic/Immunologic: No nasal congestion or hives.     Physical Examination:    Vitals:    11/22/17 1357   BP: 126/80   Pulse: 76        Constitutional and General Appearance: NAD, pleasant  Skin:good turgor,intact without lesions  HEENT: EOMI ,normal  Neck:no JVD    Respiratory:  · Normal excursion and expansion without use of accessory muscles  · Resp Auscultation: Normal breath sounds without dullness  Cardiovascular:  · The apical impulses not displaced  · Heart tones are crisp and normal  · Cervical veins are not engorged  · The carotid upstroke is normal in amplitude and contour without delay or bruit  · Normal S1S2, No S3, no murmur  · Peripheral pulses are symmetrical surgery, if necessary, may come off of xarelto(would prefer not stopping)  Continue all other medications    F/U in 6 months    Thank you for allowing me to participate in the care of this individual.    Gissell August MD, [facility]

## 2017-11-22 NOTE — COMMUNICATION BODY
here for visit today. Past Medical History:   has a past medical history of Allergic; Arthritis; Depression; DVT (deep venous thrombosis) ([facility] [address] HTN (hypertension), benign; Hyperlipidemia; Movement disorder; New onset seizure ([facility] [address] Unspecified cerebral artery occlusion with cerebral infarction; Unspecified diseases of blood and blood-forming organs; and Vitamin D deficiency. Surgical History:   has a past surgical history that includes Carpal tunnel release; Knee arthroscopy (Bilateral); Colonoscopy; Tonsillectomy; Cosmetic surgery; other surgical history (Right); and hip surgery (Right, 4/21/15.). Social History:   reports that she has never smoked. She has never used smokeless tobacco. She reports that she does not drink alcohol or use drugs. Family History:  family history includes Arthritis in her mother; Asthma in her father; Depression in her mother; Heart Disease in her brother, father, and sister; Stroke in her mother. Home medications:    Current Outpatient Prescriptions:     traZODone (DESYREL) 50 MG tablet, , Disp: , Rfl:     rivaroxaban (XARELTO) 20 MG TABS tablet, Take 1 tablet by mouth daily, Disp: 90 tablet, Rfl: 3    Calcium Carb-Cholecalciferol 500-400 MG-UNIT TABS, Take 1 tablet by mouth daily, Disp: , Rfl:     Multiple Vitamins-Minerals (THERAPEUTIC MULTIVITAMIN-MINERALS) tablet, Take 1 tablet by mouth daily, Disp: , Rfl:     LORazepam (ATIVAN) 0.5 MG tablet, Take 0.5 mg by mouth daily , Disp: , Rfl:     alendronate (FOSAMAX) 70 MG tablet, Take 70 mg by mouth every 7 days Takes on Saturday, Disp: , Rfl:     Omega-3 Fatty Acids (FISH OIL) 1200 MG CAPS, Take 1 tablet by mouth daily. , Disp: , Rfl:     pravastatin (PRAVACHOL) 80 MG tablet, Take 80 mg by mouth daily. , Disp: , Rfl:     Coenzyme Q10-Levocarnitine (CO Q-10 PLUS PO), Take 100 mg by mouth daily , Disp: , Rfl:     citalopram (CELEXA) 10 MG tablet, Take 5 mg by mouth daily , Disp: , Rfl:     vitamin D (CHOLECALCIFEROL) 400 UNIT TABS tablet, Take 5,000 Units by mouth daily. , Disp: , Rfl:         Allergies:  Flagyl [metronidazole]; Omnicef; Cephalexin; and Penicillins     Review of Systems:   · Constitutional: there has been no unanticipated weight loss. There's been no change in energy level, sleep pattern, or activity level. · Eyes: No visual changes or diplopia. No scleral icterus. · ENT: No Headaches, hearing loss or vertigo. No mouth sores or sore throat. · Cardiovascular: Reviewed in HPI  · Respiratory: No cough or wheezing, no sputum production. No hematemesis. · Gastrointestinal: No abdominal pain, appetite loss, blood in stools. No change in bowel or bladder habits. · Genitourinary: No dysuria, trouble voiding, or hematuria. · Musculoskeletal:  No gait disturbance, weakness or joint complaints. · Integumentary: No rash or pruritis. · Neurological: No headache, diplopia, change in muscle strength, numbness or tingling. No change in gait, balance, coordination, mood, affect, memory, mentation, behavior. · Psychiatric: No anxiety, no depression. · Endocrine: No malaise, fatigue or temperature intolerance. No excessive thirst, fluid intake, or urination. No tremor. · Hematologic/Lymphatic: No abnormal bruising or bleeding, blood clots or swollen lymph nodes. · Allergic/Immunologic: No nasal congestion or hives.     Physical Examination:    Vitals:    11/22/17 1357   BP: 126/80   Pulse: 76        Constitutional and General Appearance: NAD, pleasant  Respiratory:  · Normal excursion and expansion without use of accessory muscles  · Resp Auscultation: Normal breath sounds without dullness  Cardiovascular:  · The apical impulses not displaced  · Heart tones are crisp and normal  · Cervical veins are not engorged  · The carotid upstroke is normal in amplitude and contour without delay or bruit  · Normal S1S2, No S3, no murmur  · Peripheral pulses are symmetrical and full  · There is no clubbing, cyanosis of the extremities. · No edema. · Femoral Arteries: 2+ and equal  · Pedal Pulses: 2+ and equal   Abdomen:  · No masses or tenderness  · Liver/Spleen: No Abnormalities Noted  Neurological/Psychiatric:  · Alert and oriented in all spheres  · Moves all extremities well  · Exhibits normal gait balance and coordination  · No abnormalities of mood, affect, memory, mentation, or behavior are noted    ARIN 12/27/16  Summary   Mild mitral regurgitation is present.   A bubble study was performed and showed no evidence of shunting. No evidence   to support presence of a PFO    1/2016 Echo Bubble Study---  Mild concentric left ventricular hypertrophy is present. Overall left ventricular function is normal.  Ejection fraction is visually estimated to be 55-60%. There is reversal of  E/A inflow velocities across the mitral valve suggesting impaired left ventricular relaxation. Severe calcification of the posterior mitral valve annulus. A bubble study was performed and fails to show evidence of shunting. Right heart size is borderline enlarged with normal RV function. Assessment:   1. ASD (atrial septal defect) - Echo 10/2014: EF 55-60% no evidence for shunting, RVSP 53 mmHg. Bubble study Echo 4/2014 does not document ASD.  1/2016 bubble study echo does not document ASD. ARIN 12/27/16 showed no shunting to support evidence of PFO. 2. Hyperlipidemia: /80 (Site: Left Arm, Position: Sitting, Cuff Size: Medium Adult)   Pulse 76   Ht 5' 4\" (1.626 m)   Wt 131 lb 11.2 oz (59.7 kg)   BMI 22.61 kg/m²   stable   3. occlusion and stenosis of carotid arteries--TIA (transient ischemic attack): Event in 2014,  recurrence in June 2016. No recurrence since June. On Xarelto 20 mg.   6/17    Right 1-15%  Left 16-49%     4. Factor V Leiden: Hx of DVT while on hormone therapy, on coumadin. On Xarelto 20 mg now. Dr. Felipe Hood follows. Plan:Bibiana is stable from a cardiac standpoint.    Can proceed with cataract surgery, if necessary, may come off of xarelto(would prefer not stopping)  Continue all other medications    F/U in 6 months    Thank you for allowing me to participate in the care of this individual.    Ramón Greene MD, [facility]

## 2017-11-22 NOTE — PATIENT INSTRUCTIONS
Can proceed with cataract surgery, if necessary, may come off of xarelto(would prefer not stopping)  Continue all other medications

## 2017-11-29 ENCOUNTER — OFFICE VISIT (OUTPATIENT)
Dept: ORTHOPEDIC SURGERY | Age: 77
End: 2017-11-29

## 2017-11-29 DIAGNOSIS — M25.551 RIGHT HIP PAIN: Primary | ICD-10-CM

## 2017-11-29 PROCEDURE — 1123F ACP DISCUSS/DSCN MKR DOCD: CPT | Performed by: ORTHOPAEDIC SURGERY

## 2017-11-29 PROCEDURE — G8427 DOCREV CUR MEDS BY ELIG CLIN: HCPCS | Performed by: ORTHOPAEDIC SURGERY

## 2017-11-29 PROCEDURE — 4040F PNEUMOC VAC/ADMIN/RCVD: CPT | Performed by: ORTHOPAEDIC SURGERY

## 2017-11-29 PROCEDURE — 99214 OFFICE O/P EST MOD 30 MIN: CPT | Performed by: ORTHOPAEDIC SURGERY

## 2017-11-29 PROCEDURE — 1036F TOBACCO NON-USER: CPT | Performed by: ORTHOPAEDIC SURGERY

## 2017-11-29 PROCEDURE — 1090F PRES/ABSN URINE INCON ASSESS: CPT | Performed by: ORTHOPAEDIC SURGERY

## 2017-11-29 PROCEDURE — G8484 FLU IMMUNIZE NO ADMIN: HCPCS | Performed by: ORTHOPAEDIC SURGERY

## 2017-11-29 PROCEDURE — G8420 CALC BMI NORM PARAMETERS: HCPCS | Performed by: ORTHOPAEDIC SURGERY

## 2017-11-29 PROCEDURE — G8400 PT W/DXA NO RESULTS DOC: HCPCS | Performed by: ORTHOPAEDIC SURGERY

## 2017-11-29 RX ORDER — PREDNISONE 10 MG/1
TABLET ORAL
Qty: 30 TABLET | Refills: 0 | Status: SHIPPED | OUTPATIENT
Start: 2017-11-29 | End: 2018-01-05 | Stop reason: ALTCHOICE

## 2017-11-29 NOTE — PROGRESS NOTES
Asked     Other Topics Concern    None     Social History Narrative    None     Current Outpatient Prescriptions   Medication Sig Dispense Refill    traZODone (DESYREL) 50 MG tablet       rivaroxaban (XARELTO) 20 MG TABS tablet Take 1 tablet by mouth daily 90 tablet 3    Calcium Carb-Cholecalciferol 500-400 MG-UNIT TABS Take 1 tablet by mouth daily      Multiple Vitamins-Minerals (THERAPEUTIC MULTIVITAMIN-MINERALS) tablet Take 1 tablet by mouth daily      LORazepam (ATIVAN) 0.5 MG tablet Take 0.5 mg by mouth daily       alendronate (FOSAMAX) 70 MG tablet Take 70 mg by mouth every 7 days Takes on Saturday      Omega-3 Fatty Acids (FISH OIL) 1200 MG CAPS Take 1 tablet by mouth daily.  pravastatin (PRAVACHOL) 80 MG tablet Take 80 mg by mouth daily.  Coenzyme Q10-Levocarnitine (CO Q-10 PLUS PO) Take 100 mg by mouth daily       citalopram (CELEXA) 10 MG tablet Take 5 mg by mouth daily       vitamin D (CHOLECALCIFEROL) 400 UNIT TABS tablet Take 5,000 Units by mouth daily. No current facility-administered medications for this visit. Allergies   Allergen Reactions    Flagyl [Metronidazole] Diarrhea    Omnicef Diarrhea    Cephalexin Diarrhea    Penicillins Hives       REVIEW OF SYSTEMS:   Pertinent items are noted in HPI  Review of systems reviewed from Patient History Form dated on 11/29/17 and available in the patient's chart under the Media tab. Examination:    General Exam:    Vitals: not currently breastfeeding. Constitutional: Patient is adequately groomed with no evidence of malnutrition  Mental Status: The patient is oriented to time, place and person. The patient's mood and affect are appropriate. Lymphatic: The lymphatic examination bilaterally reveals all areas to be without enlargement or induration. Vascular: Examination reveals no swelling or calf tenderness. Peripheral pulses are palpable and 2+.   Neurological: The patient has good coordination. There is no weakness or sensory deficit. Skin:    Head/Neck: inspection reveals no rashes, ulcerations or lesions. Trunk:  inspection reveals no rashes, ulcerations or lesions. Right Lower Extremity: inspection reveals no rashes, ulcerations or lesions. Left Lower Extremity: inspection reveals no rashes, ulcerations or lesions. Hip  Examination  Inspection:  No obvious swelling or deformity    Palpation:  Right SI joint pain to palpation    Rang of Motion:  Side bending reproduces her symptomatology flexion extension and rotation does not    Strength:  Quadricep, hamstring, EHL, hip flexor, internal and external rotation of the hip against resistance, all 5 over 5 equal bilaterally    Special Tests:  Negative straight leg raise, pedal pulse are +2 over 4 capillary refill is brisk sensation is intact negative Homans negative Dm negative straight leg raise SI joint pain to palpation no swelling no changes in her sensation or bowel or bladder habits    Skin: There are no rashes, ulcerations or lesions. Gait: Normal slightly antalgic gait    Additional Comments:     Additional Examinations:  Left Lower Extremity: Examination of the left lower extremity does not show any tenderness, deformity or injury. Range of motion is unremarkable. There is no gross instability. There are no rashes, ulcerations or lesions. Strength and tone are normal.  Thoracic Spine: Examination of the thoracic spine does not show any tenderness, deformity or injury. Range of motion is unremarkable. There is no gross instability. There are no rashes, ulcerations or lesions. Strength and tone are normal.  Neck: Examination of the neck does not show any tenderness, deformity or injury. Range of motion is unremarkable. There is no gross instability. There are no rashes, ulcerations or lesions.   Strength and tone are normal.      Diagnostic Testing:    Views AP pelvis taken in the office today  Body Part

## 2017-12-05 ENCOUNTER — HOSPITAL ENCOUNTER (OUTPATIENT)
Dept: PHYSICAL THERAPY | Age: 77
Discharge: HOME | End: 2017-12-31
Admitting: ORTHOPAEDIC SURGERY

## 2017-12-05 NOTE — PLAN OF CARE
[address] [facility] [place]  Phone: [phone]   Fax:     [phone]                                                       Physical Therapy Certification    Dear Referring Practitioner: Dr Rachael Keane,    We had the pleasure of evaluating the following patient for physical therapy services at [address] [place] [address]. A summary of our findings can be found in the initial assessment below. This includes our plan of care. If you have any questions or concerns regarding these findings, please do not hesitate to contact me at the office phone number checked above. Thank you for the referral.       Physician Signature:_______________________________Date:__________________  By signing above (or electronic signature), therapists plan is approved by physician      Patient: Jake Jefferson   : 1940   MRN: [id number]  Referring Physician: Referring Practitioner: Dr Rachael Keane      Evaluation Date: 2017      Medical Diagnosis Information:  Diagnosis: M25.551 (ICD-10-CM) - Right hip pain   Treatment Diagnosis: M25.551 (ICD-10-CM) - Right hip pain                                         Insurance information:       Precautions/ Contra-indications: R femur fracture, Stroke/TIA  Latex Allergy:  [x]NO      []YES  Preferred Language for Healthcare:   [x]English       []other:    SUBJECTIVE: Patient stated complaint:Patient presents today with complaints of R lateral hip pain. She reports falling in  and fracturing femur, intertrochanteric fracture. She reports progressively improving but having pain with sleeping on R side, sitting greater than 45 min and difficulty getting up and down to floor. She denies any back pain, groin pain, denies any pain distal to R greater trochanter. Denies any difficulty walking and usually walks 15 min with no problem. Denies any balance issues or falls.   Reports pain as an ache, variable and intermittent. Wants to decrease soreness    Relevant Medical History:See above  Functional Disability Index/G-Codes:  PT G-Codes  Functional Assessment Tool Used: LEFS  Score: 43/80  Functional Limitation: Mobility: Walking and moving around  Mobility: Walking and Moving Around Current Status (): At least 40 percent but less than 60 percent impaired, limited or restricted  Mobility: Walking and Moving Around Goal Status ():  At least 20 percent but less than 40 percent impaired, limited or restricted    Pain Scale: 0-7/10  Easing factors: movement  Provocative factors: Up/down on floor, sleeping on right side, sitting greater than 45 min     Type: []Constant   [x]Intermittent  []Radiating []Localized []other:     Numbness/Tingling: denies    Occupation/School: retired    Living Status/Prior Level of Function: Independent with ADLs and IADLs,      OBJECTIVE:     ROM  Comments   Lumbar Flex FT to toes    Lumbar Ext 30 deg      ROM LEFT RIGHT Comments   Lumbar Side Bend WNL Pain in low back at end range    Lumbar Rotation WNL WNL    Hip Flexion WNL WNL    Hip Abd WNL WNL    Hip ER 25 15    Hip IR 15 15    Hip Extension WNL WNL    Knee Ext      Knee Flex      Hamstring Flex      Piriformis                    Strength LEFT RIGHT Comments   Multifidus      Transverse Ab      Hip Flexors      Hip Abductors 4+ 3+ Unable to perform clam due to posterior lateral soreness   Hip Extensors      Hip abd in hookly 18 lb 9 lb             Myotomes Normal Abnormal Comments   [x]ALL NORMAL            Hip flexion (L1-L2) [] []    Knee extension (L2-L4) [] []    Dorsiflexion (L4-L5) [] []    Great Toe Ext (L5) [] []    Ankle Eversion (S1-S2) [] []    Ankle PF(S1-S2) [] []      Dermatomes Normal Abnormal Comments   [x]ALL NORMAL            inguinal area (L1)  [] []    anterior mid-thigh (L2) [] []    distal ant thigh/med knee (L3) [] []    medial lower leg and foot (L4) [] []    lateral lower leg and foot (L5) [] []    posterior calf (S1) [] []    medial calcaneus (S2) [] []        Reflexes Normal Abnormal Comments               S1-2 Seated achilles [] []    S1-2 Prone knee bend [] []    L3-4 Patellar tendon [] []    C5-6 Biceps [] []    C6 Brachioradialis [] []    C7-8 Triceps [] []    Clonus [] []    Babinski [] []    Lara's [] []      Joint mobility:     []Normal    [x]Hypo   []Hyper    Palpation: TTP over posterior lateral GT    Functional Mobility/Transfers: Mod A with ascending from floor    Posture: WNL    Gait: (include devices/WB status) Mild trendelenburg     Bandages/Dressings/Incisions: NA     Orthopedic Special Tests:     Neural dynamic tension testing Normal Abnormal Comments   Slump Test  - Degree of knee flexion:  [] []    SLR  [] []    0-30 [x] []    30-70 [] []    Femoral nerve (L2-4) [] []       Normal Abnormal N/A Comments   Fwd Bend-aberrant or innominate mvmt) [] [] []    Trendelenburg [] [] []    Kemps/Quadrant [] [] []    Radha Calles [] [] []    CARMELO/Travis [] [] []    Hip scour [] [] []    Supine to sit [] [] []    Prone knee bend [] [] []           Hip thrust [] [] []    SI distraction/compression [] [] []    Sacral Spring/thrust [] [] []               [x] Patient history, allergies, meds reviewed. Medical chart reviewed. See intake form. Review Of Systems (ROS):  [x]Performed Review of systems (Integumentary, CardioPulmonary, Neurological) by intake and observation. Intake form has been scanned into medical record. Patient has been instructed to contact their primary care physician regarding ROS issues if not already being addressed at this time.       Co-morbidities/Complexities (which will affect course of rehabilitation):    []None           Arthritic conditions   []Rheumatoid arthritis (M05.9)  [x]Osteoarthritis (M19.91)   Cardiovascular conditions   []Hypertension (I10)  []Hyperlipidemia (E78.5)  []Angina pectoris (I20)  []Atherosclerosis (I70)  []CVA Musculoskeletal conditions   []Disc pathology   []Congenital spine pathologies   []Prior surgical intervention  []Osteoporosis (M81.8)  [x]Osteopenia (M85.8)   Endocrine conditions   []Hypothyroid (E03.9)  []Hyperthyroid Gastrointestinal conditions   []Constipation (G95.81)   Metabolic conditions   []Morbid obesity (E66.01)  []Diabetes type 1(E10.65) or 2 (E11.65)   []Neuropathy (G60.9)     Pulmonary conditions   []Asthma (J45)  []Coughing   []COPD (J44.9)   Psychological Disorders  []Anxiety (F41.9)  []Depression (F32.9)   []Other:   Stroke TIA      Barriers to/and or personal factors that will affect rehab potential:              []Age  []Sex    []Smoker              []Motivation/Lack of Motivation                        [x]Co-Morbidities              []Cognitive Function, education/learning barriers              []Environmental, home barriers              []profession/work barriers  []past PT/medical experience  []other:  Justification:     Falls Risk Assessment (30 days):    [x] Falls Risk assessed and no intervention required. [] Falls Risk assessed and Patient requires intervention due to being higher risk   TUG score (>12s at risk):     [] Falls education provided, including       G-Codes:  PT G-Codes  Functional Assessment Tool Used: LEFS  Score: 43/80  Functional Limitation: Mobility: Walking and moving around  Mobility: Walking and Moving Around Current Status (): At least 40 percent but less than 60 percent impaired, limited or restricted  Mobility: Walking and Moving Around Goal Status (): At least 20 percent but less than 40 percent impaired, limited or restricted    ASSESSMENT: Patient presents today with weakness in proximal hip/ER's that is leading to functional weakness such as raising up off floor.   She was initiated on HEP to address the above impairments and should progress well  Functional Impairments:     [x]Noted lumbar/proximal hip hypomobility   []Noted lumbosacral and/or generalized hypermobility   []Decreased

## 2017-12-05 NOTE — FLOWSHEET NOTE
[address] [place] [address]  Phone: [phone] Fax: [phone]    Physical Therapy Daily Treatment Note  Date:  2017    Patient Name:  Lance Walker    :  1940  MRN: [id number]  Restrictions/Precautions:    Medical/Treatment Diagnosis Information:  · Diagnosis: M25.551 (ICD-10-CM) - Right hip pain  · Treatment Diagnosis: M25.551 (ICD-10-CM) - Right hip pain  Insurance/Certification information:     Physician Information:  Referring Practitioner: Dr Alireza Awad of care signed (Y/N):     Date of Patient follow up with Physician:     G-Code (if applicable):      Date G-Code Applied:    PT G-Codes  Functional Assessment Tool Used: LEFS  Score: 43/80  Functional Limitation: Mobility: Walking and moving around  Mobility: Walking and Moving Around Current Status (): At least 40 percent but less than 60 percent impaired, limited or restricted  Mobility: Walking and Moving Around Goal Status ():  At least 20 percent but less than 40 percent impaired, limited or restricted    Progress Note: [x]  Yes  []  No  Next due by: Visit #10       Latex Allergy:  [x]NO      []YES  Preferred Language for Healthcare:   [x]English       []other:    Visit # Insurance Allowable   1 15     Pain level:  0-7/10     SUBJECTIVE:  See eval    OBJECTIVE: See eval  Observation:   Test measurements:      RESTRICTIONS/PRECAUTIONS: R hip pain, weakness ER/abd    Exercises/Interventions:     Therapeutic Ex Sets/sec Reps Notes   Retro Stepper/BIKE      Sportcord March      3 way SLR      SAQ      Clam ABD      Hip Ext Gweneth Pintos      Bosu fwd/side lunge      Slide Lunge      Leg Press Con/Ecc 0-      Cybex HS curl      TKE      Glute side walks      BOSU squat      Clam isometric supine 2 12    SLS glute med activation 5 sec 15          Manual Intervention      Knee mobs/PROM      Tib/Fem Mobs      Patella Mobs      Ankle mobs                  NMR re-education      Bhutanese/Biofeedback 10/10      G. Med activation/sidelying      G. Max Activation/prone      Hip Ext full ROM G. Activation      Bosu Bal and Prop- G Med      Single leg stance/Balance/Prop      Bosu Retro G. Med act                      Therapeutic Exercise and NMR EXR  [x] (20105) Provided verbal/tactile cueing for activities related to strengthening, flexibility, endurance, ROM for improvements in LE, proximal hip, and core control with self care, mobility, lifting, ambulation. [x] (89348) Provided verbal/tactile cueing for activities related to improving balance, coordination, kinesthetic sense, posture, motor skill, proprioception  to assist with LE, proximal hip, and core control in self care, mobility, lifting, ambulation and eccentric single leg control.      NMR and Therapeutic Activities:    [] (68329 or 23696) Provided verbal/tactile cueing for activities related to improving balance, coordination, kinesthetic sense, posture, motor skill, proprioception and motor activation to allow for proper function of core, proximal hip and LE with self care and ADLs  [] (35632) Gait Re-education- Provided training and instruction to the patient for proper LE, core and proximal hip recruitment and positioning and eccentric body weight control with ambulation re-education including up and down stairs     Home Exercise Program:    [x] (11294) Reviewed/Progressed HEP activities related to strengthening, flexibility, endurance, ROM of core, proximal hip and LE for functional self-care, mobility, lifting and ambulation/stair navigation   [] (72121)Reviewed/Progressed HEP activities related to improving balance, coordination, kinesthetic sense, posture, motor skill, proprioception of core, proximal hip and LE for self care, mobility, lifting, and ambulation/stair navigation      Manual Treatments:  PROM / STM / Oscillations-Mobs:  G-I, II, III, IV (PA's, Inf., Post.)  [] (13604) Provided manual therapy to

## 2017-12-18 RX ORDER — RIVAROXABAN 20 MG/1
TABLET, FILM COATED ORAL
Qty: 90 TABLET | Refills: 3 | Status: ON HOLD | OUTPATIENT
Start: 2017-12-18 | End: 2018-07-25

## 2017-12-19 ENCOUNTER — HOSPITAL ENCOUNTER (OUTPATIENT)
Dept: PHYSICAL THERAPY | Age: 77
Discharge: HOME | End: 2017-12-19
Admitting: ORTHOPAEDIC SURGERY

## 2017-12-19 NOTE — FLOWSHEET NOTE
[address] [address] [facility]  Phone: [phone] Fax: [phone]    Physical Therapy Daily Treatment Note  Date:  2017    Patient Name:  Stanley No    :  1940  MRN: [id number]  Restrictions/Precautions:    Medical/Treatment Diagnosis Information:  · Diagnosis: M25.551 (ICD-10-CM) - Right hip pain  · Treatment Diagnosis: M25.551 (ICD-10-CM) - Right hip pain  Insurance/Certification information:     Physician Information:  Referring Practitioner: Dr Nick Grayson of care signed (Y/N):     Date of Patient follow up with Physician:     G-Code (if applicable):      Date G-Code Applied:    PT G-Codes  Functional Assessment Tool Used: LEFS  Score: 43/80  Functional Limitation: Mobility: Walking and moving around  Mobility: Walking and Moving Around Current Status (): At least 40 percent but less than 60 percent impaired, limited or restricted  Mobility: Walking and Moving Around Goal Status (): At least 20 percent but less than 40 percent impaired, limited or restricted    Progress Note: [x]  Yes  []  No  Next due by: Visit #10       Latex Allergy:  [x]NO      []YES  Preferred Language for Healthcare:   [x]English       []other:    Visit # Insurance Allowable   3 15     Pain level:  0-4/10     SUBJECTIVE: Patient reports having decreased hip pain overall, minimal pain with walking. Still as slight twinge with rolling in bed, no difficulty with HEP.       OBJECTIVE: See eval  Observation:   Test measurements:      RESTRICTIONS/PRECAUTIONS: R hip pain, weakness ER/abd    Exercises/Interventions:     Therapeutic Ex Sets/sec Reps Notes   Retro Stepper/BIKE      Sportcord March      3 way SLR      SAQ      Clam ABD      Hip Ext Baudilio Gaunt      Bosu fwd/side lunge      Slide Lunge      Leg Press Con/Ecc 0-      Cybex HS curl      Supine lateral hip st 30 sec 5    Sidely hip abd 3 5    Clam sidely 2 12    Clam isometric supine 2 12    SLS glute med activation 5 sec 15    Lateral stepping 2 3 orange   Manual Intervention      Knee mobs/PROM      Tib/Fem Mobs      Patella Mobs      Ankle mobs      STM R lateral hip 10 min           NMR re-education       BOSU lunge 2 12    SLS airex 5 sec 12 w reaches   Tandem airex 5 sec 10 EO/EC   Biodex 3 min  Level 11                                     Therapeutic Exercise and NMR EXR  [x] (68707) Provided verbal/tactile cueing for activities related to strengthening, flexibility, endurance, ROM for improvements in LE, proximal hip, and core control with self care, mobility, lifting, ambulation. [x] (77777) Provided verbal/tactile cueing for activities related to improving balance, coordination, kinesthetic sense, posture, motor skill, proprioception  to assist with LE, proximal hip, and core control in self care, mobility, lifting, ambulation and eccentric single leg control.      NMR and Therapeutic Activities:    [] (00079 or 59414) Provided verbal/tactile cueing for activities related to improving balance, coordination, kinesthetic sense, posture, motor skill, proprioception and motor activation to allow for proper function of core, proximal hip and LE with self care and ADLs  [] (54843) Gait Re-education- Provided training and instruction to the patient for proper LE, core and proximal hip recruitment and positioning and eccentric body weight control with ambulation re-education including up and down stairs     Home Exercise Program:    [x] (53958) Reviewed/Progressed HEP activities related to strengthening, flexibility, endurance, ROM of core, proximal hip and LE for functional self-care, mobility, lifting and ambulation/stair navigation   [] (98473)Reviewed/Progressed HEP activities related to improving balance, coordination, kinesthetic sense, posture, motor skill, proprioception of core, proximal hip and LE for self care, mobility, lifting, and ambulation/stair navigation      Manual Treatments:  PROM / STM / Oscillations-Mobs:  G-I, II, III, IV (PA's, Inf., Post.)  [x] (28689) Provided manual therapy to mobilize LE, proximal hip and/or LS spine soft tissue/joints for the purpose of modulating pain, promoting relaxation,  increasing ROM, reducing/eliminating soft tissue swelling/inflammation/restriction, improving soft tissue extensibility and allowing for proper ROM for normal function with self care, mobility, lifting and ambulation. Modalities:      Charges:  Timed Code Treatment Minutes: 45   Total Treatment Minutes: 50     [] EVAL (LOW) 79914 (typically 20 minutes face-to-face)  [] EVAL (MOD) 45801 (typically 30 minutes face-to-face)  [] EVAL (HIGH) 52790 (typically 45 minutes face-to-face)  [] RE-EVAL     [x] SG(14009) x  1   [] IONTO  [x] NMR (83929) x  1   [] VASO  [x] Manual (70819) x  1    [] Other:  [] TA x       [] Mech Traction (33967)  [] ES(attended) (32193)      [] ES (un) (35926):     GOALS:     GOALS:  Patient stated goal: decrease pain     Therapist goals for Patient:   Short Term Goals: To be achieved in: 2 weeks  1. Independent in HEP and progression per patient tolerance, in order to prevent re-injury. 2. Patient will have a decrease in pain to facilitate improvement in movement, function, and ADLs as indicated by Functional Deficits.     Long Term Goals: To be achieved in: 4 weeks  1. Disability index score of 60/80% or less for the CHANDNI to assist with reaching prior level of function. 2. Patient will demonstrate increased AROM to WNL, good LS mobility, good hip ROM to allow for proper joint functioning as indicated by patients Functional Deficits. 3. Patient will demonstrate an increase in Strength to good proximal hip and core activation to allow for proper functional mobility as indicated by patients Functional Deficits. 4. Patient will return to raising up off floor with no assist functional activities without increased symptoms or restriction.    5. Min pain

## 2017-12-20 ENCOUNTER — OFFICE VISIT (OUTPATIENT)
Dept: ORTHOPEDIC SURGERY | Age: 77
End: 2017-12-20

## 2017-12-20 VITALS — WEIGHT: 131 LBS | BODY MASS INDEX: 22.36 KG/M2 | HEIGHT: 64 IN

## 2017-12-20 DIAGNOSIS — M25.551 RIGHT HIP PAIN: Primary | ICD-10-CM

## 2017-12-20 PROCEDURE — 4040F PNEUMOC VAC/ADMIN/RCVD: CPT | Performed by: ORTHOPAEDIC SURGERY

## 2017-12-20 PROCEDURE — 1090F PRES/ABSN URINE INCON ASSESS: CPT | Performed by: ORTHOPAEDIC SURGERY

## 2017-12-20 PROCEDURE — 1123F ACP DISCUSS/DSCN MKR DOCD: CPT | Performed by: ORTHOPAEDIC SURGERY

## 2017-12-20 PROCEDURE — G8420 CALC BMI NORM PARAMETERS: HCPCS | Performed by: ORTHOPAEDIC SURGERY

## 2017-12-20 PROCEDURE — G8400 PT W/DXA NO RESULTS DOC: HCPCS | Performed by: ORTHOPAEDIC SURGERY

## 2017-12-20 PROCEDURE — G8427 DOCREV CUR MEDS BY ELIG CLIN: HCPCS | Performed by: ORTHOPAEDIC SURGERY

## 2017-12-20 PROCEDURE — G8484 FLU IMMUNIZE NO ADMIN: HCPCS | Performed by: ORTHOPAEDIC SURGERY

## 2017-12-20 PROCEDURE — 1036F TOBACCO NON-USER: CPT | Performed by: ORTHOPAEDIC SURGERY

## 2017-12-20 PROCEDURE — 99213 OFFICE O/P EST LOW 20 MIN: CPT | Performed by: ORTHOPAEDIC SURGERY

## 2017-12-20 NOTE — PROGRESS NOTES
500-400 MG-UNIT TABS Take 1 tablet by mouth daily      Multiple Vitamins-Minerals (THERAPEUTIC MULTIVITAMIN-MINERALS) tablet Take 1 tablet by mouth daily      LORazepam (ATIVAN) 0.5 MG tablet Take 0.5 mg by mouth daily       alendronate (FOSAMAX) 70 MG tablet Take 70 mg by mouth every 7 days Takes on Saturday      Omega-3 Fatty Acids (FISH OIL) 1200 MG CAPS Take 1 tablet by mouth daily.  pravastatin (PRAVACHOL) 80 MG tablet Take 80 mg by mouth daily.  Coenzyme Q10-Levocarnitine (CO Q-10 PLUS PO) Take 100 mg by mouth daily       citalopram (CELEXA) 10 MG tablet Take 5 mg by mouth daily       vitamin D (CHOLECALCIFEROL) 400 UNIT TABS tablet Take 5,000 Units by mouth daily. No current facility-administered medications for this visit. Allergies   Allergen Reactions    Flagyl [Metronidazole] Diarrhea    Omnicef Diarrhea    Cephalexin Diarrhea    Penicillins Hives       REVIEW OF SYSTEMS:   Pertinent items are noted in HPI  Review of systems reviewed from Patient History Form dated on 12/20/17 and available in the patient's chart under the Media tab. Examination:    General Exam:    Vitals: Height 5' 4\" (1.626 m), weight 131 lb (59.4 kg), not currently breastfeeding. Constitutional: Patient is adequately groomed with no evidence of malnutrition  Mental Status: The patient is oriented to time, place and person. The patient's mood and affect are appropriate.         Lumbar/Sacral Spine Examination  Inspection:  No deformity    Palpation:  No pain to palpation    Rang of Motion:  Full active range of motion of the hip today without any pain still stiff in the lumbar spine    Strength:  Quadricep, hamstring, EHL, hip flexor, internal and external rotation of the hip against resistance, all 5 over 5 equal bilaterally    Special Tests:  Pedal pulse are +2 over 4 capillary refill is brisk sensation is intact negative Homans negative Dm negative straight leg raise    Gait: Slightly antalgic    Additional Comments:     Additional Examinations:  Right Lower Extremity: Examination of the right lower extremity does not show any tenderness, deformity or injury. Range of motion is unremarkable. There is no gross instability. There are no rashes, ulcerations or lesions. Strength and tone are normal.  Thoracic Spine: Examination of the thoracic spine does not show any tenderness, deformity or injury. Range of motion is unremarkable. There is no gross instability. There are no rashes, ulcerations or lesions. Strength and tone are normal.              Assessment:  Lumbar osteoarthritis    Impression: Lumbar osteoarthritis    Office Procedures:  No orders of the defined types were placed in this encounter.       Treatment Plan:  Start increasing activities with physical therapy and train her follow-up in 6-8 weeks         Everette Dowd,

## 2017-12-28 ENCOUNTER — HOSPITAL ENCOUNTER (OUTPATIENT)
Dept: PHYSICAL THERAPY | Age: 77
Discharge: HOME | End: 2017-12-28
Admitting: ORTHOPAEDIC SURGERY

## 2017-12-28 NOTE — FLOWSHEET NOTE
[address] [address] [facility]  Phone: [phone] Fax: [phone]    Physical Therapy Daily Treatment Note  Date:  2017    Patient Name:  Мария Flores    :  1940  MRN: [id number]  Restrictions/Precautions:    Medical/Treatment Diagnosis Information:  · Diagnosis: M25.551 (ICD-10-CM) - Right hip pain  · Treatment Diagnosis: M25.551 (ICD-10-CM) - Right hip pain  Insurance/Certification information:     Physician Information:  Referring Practitioner: Dr Jace Deutsch of care signed (Y/N):     Date of Patient follow up with Physician:     G-Code (if applicable):      Date G-Code Applied:    PT G-Codes  Functional Assessment Tool Used: LEFS  Score: 43/80  Functional Limitation: Mobility: Walking and moving around  Mobility: Walking and Moving Around Current Status (): At least 40 percent but less than 60 percent impaired, limited or restricted  Mobility: Walking and Moving Around Goal Status (): At least 20 percent but less than 40 percent impaired, limited or restricted    Progress Note: [x]  Yes  []  No  Next due by: Visit #10       Latex Allergy:  [x]NO      []YES  Preferred Language for Healthcare:   [x]English       []other:    Visit # Insurance Allowable   4 15     Pain level:  0-4/10     SUBJECTIVE: Patient reports that she feels good when walking. Pt had her exercise session with  this afternoon thinks she should not double up PT appointements on the same day as her exercise at the gym. OBJECTIVE: Treatment session short secondary to some pain and fatigue from already doing hip exr with .    Observation:   Test measurements:      RESTRICTIONS/PRECAUTIONS: R hip pain, weakness ER/abd    Exercises/Interventions:     Therapeutic Ex Sets/sec Reps Notes   Retro Stepper/BIKE      Sportcord March      3 way SLR      SAQ      Clam ABD      Hip Ext Lonie Jpes      Bosu fwd/side lunge      Slide Lunge      Leg Press Con/Ecc 0-      Cybex HS curl      Supine lateral hip st 30 sec 5    Sidely hip abd 3 5    Clam sidely 2 12    Clam isometric supine 2 12    SLS glute med activation 5 sec 15    Lateral stepping 2 3 orange   Manual Intervention      Knee mobs/PROM      Tib/Fem Mobs      Patella Mobs      Ankle mobs      STM R lateral hip 10 min           NMR re-education       BOSU lunge 2 12    SLS airex 5 sec 12 w reaches   Tandem airex 5 sec 10 EO/EC   Biodex 3 min  Level 11                                     Therapeutic Exercise and NMR EXR  [x] (66745) Provided verbal/tactile cueing for activities related to strengthening, flexibility, endurance, ROM for improvements in LE, proximal hip, and core control with self care, mobility, lifting, ambulation. [x] (71566) Provided verbal/tactile cueing for activities related to improving balance, coordination, kinesthetic sense, posture, motor skill, proprioception  to assist with LE, proximal hip, and core control in self care, mobility, lifting, ambulation and eccentric single leg control.      NMR and Therapeutic Activities:    [] (90647 or 07192) Provided verbal/tactile cueing for activities related to improving balance, coordination, kinesthetic sense, posture, motor skill, proprioception and motor activation to allow for proper function of core, proximal hip and LE with self care and ADLs  [] (92246) Gait Re-education- Provided training and instruction to the patient for proper LE, core and proximal hip recruitment and positioning and eccentric body weight control with ambulation re-education including up and down stairs     Home Exercise Program:    [x] (66436) Reviewed/Progressed HEP activities related to strengthening, flexibility, endurance, ROM of core, proximal hip and LE for functional self-care, mobility, lifting and ambulation/stair navigation   [] (35720)Reviewed/Progressed HEP activities related to improving balance, coordination, kinesthetic sense, posture, motor skill, proprioception of core, proximal hip and LE for self care, mobility, lifting, and ambulation/stair navigation      Manual Treatments:  PROM / STM / Oscillations-Mobs:  G-I, II, III, IV (PA's, Inf., Post.)  [x] (42015) Provided manual therapy to mobilize LE, proximal hip and/or LS spine soft tissue/joints for the purpose of modulating pain, promoting relaxation,  increasing ROM, reducing/eliminating soft tissue swelling/inflammation/restriction, improving soft tissue extensibility and allowing for proper ROM for normal function with self care, mobility, lifting and ambulation. Modalities:      Charges:  Timed Code Treatment Minutes: 30   Total Treatment Minutes: 30     [] EVAL (LOW) 78201 (typically 20 minutes face-to-face)  [] EVAL (MOD) 85506 (typically 30 minutes face-to-face)  [] EVAL (HIGH) 10743 (typically 45 minutes face-to-face)  [] RE-EVAL     [x] MQ(13151) x  1   [] IONTO  [] NMR (03311) x      [] VASO  [x] Manual (15813) x  1    [] Other:  [] TA x       [] Mech Traction (39461)  [] ES(attended) (91813)      [] ES (un) (18753):     GOALS:     GOALS:  Patient stated goal: decrease pain     Therapist goals for Patient:   Short Term Goals: To be achieved in: 2 weeks  1. Independent in HEP and progression per patient tolerance, in order to prevent re-injury. 2. Patient will have a decrease in pain to facilitate improvement in movement, function, and ADLs as indicated by Functional Deficits.     Long Term Goals: To be achieved in: 4 weeks  1. Disability index score of 60/80% or less for the CHANDNI to assist with reaching prior level of function. 2. Patient will demonstrate increased AROM to WNL, good LS mobility, good hip ROM to allow for proper joint functioning as indicated by patients Functional Deficits.    3. Patient will demonstrate an increase in Strength to good proximal hip and core activation to allow for proper functional mobility as indicated by patients Functional Deficits. 4. Patient will return to raising up off floor with no assist functional activities without increased symptoms or restriction. 5. Min pain with sleeping(patient specific functional goal)     Progression Towards Functional goals:  [] Patient is progressing as expected towards functional goals listed. [] Progression is slowed due to complexities listed. [] Progression has been slowed due to co-morbidities. [x] Plan just implemented, too soon to assess goals progression  [] Other:     ASSESSMENT:  Limited tolerance today. Discomfort c BOSU lunge and clamsells secondary to fatigue.        Return to Play: (if applicable)   []  Stage 1: Intro to Strength   []  Stage 2: Return to Run and Strength   []  Stage 3: Return to Jump and Strength   []  Stage 4: Dynamic Strength and Agility   []  Stage 5: Sport Specific Training     []  Ready to Return to Play, Meets All Above Stages   []  Not Ready for Return to Sports   Comments:                         Treatment/Activity Tolerance:  [x] Patient tolerated treatment well [] Patient limited by fatique  [] Patient limited by pain  [] Patient limited by other medical complications  [] Other:     Prognosis: [x] Good [] Fair  [] Poor    Patient Requires Follow-up: [x] Yes  [] No      PLAN: See eval  [] Continue per plan of care [] Alter current plan (see comments)  [x] Plan of care initiated [] Hold pending MD visit [] Discharge    Electronically signed by: Jeannine Shay PTA

## 2018-01-01 ENCOUNTER — HOSPITAL ENCOUNTER (OUTPATIENT)
Dept: OTHER | Age: 78
Discharge: HOME | End: 2018-01-31
Attending: ORTHOPAEDIC SURGERY | Admitting: ORTHOPAEDIC SURGERY

## 2018-01-02 ENCOUNTER — HOSPITAL ENCOUNTER (OUTPATIENT)
Dept: PHYSICAL THERAPY | Age: 78
Discharge: HOME | End: 2018-01-02
Admitting: ORTHOPAEDIC SURGERY

## 2018-01-02 NOTE — FLOWSHEET NOTE
G-I, II, III, IV (PA's, Inf., Post.)  [x] (80243) Provided manual therapy to mobilize LE, proximal hip and/or LS spine soft tissue/joints for the purpose of modulating pain, promoting relaxation,  increasing ROM, reducing/eliminating soft tissue swelling/inflammation/restriction, improving soft tissue extensibility and allowing for proper ROM for normal function with self care, mobility, lifting and ambulation. Modalities:      Charges:  Timed Code Treatment Minutes: 40   Total Treatment Minutes: 40     [] EVAL (LOW) 95196 (typically 20 minutes face-to-face)  [] EVAL (MOD) 16947 (typically 30 minutes face-to-face)  [] EVAL (HIGH) 92353 (typically 45 minutes face-to-face)  [] RE-EVAL     [x] SS(67514) x  1   [] IONTO  [x] NMR (35387) x  1   [] VASO  [x] Manual (31816) x  1    [] Other:  [] TA x       [] Mech Traction (52213)  [] ES(attended) (39237)      [] ES (un) (56492):     GOALS:     GOALS:  Patient stated goal: decrease pain     Therapist goals for Patient:   Short Term Goals: To be achieved in: 2 weeks  1. Independent in HEP and progression per patient tolerance, in order to prevent re-injury. 2. Patient will have a decrease in pain to facilitate improvement in movement, function, and ADLs as indicated by Functional Deficits.     Long Term Goals: To be achieved in: 4 weeks  1. Disability index score of 60/80% or less for the CHANDNI to assist with reaching prior level of function. 75% MET  2. Patient will demonstrate increased AROM to WNL, good LS mobility, good hip ROM to allow for proper joint functioning as indicated by patients Functional Deficits. 100% MET  3. Patient will demonstrate an increase in Strength to good proximal hip and core activation to allow for proper functional mobility as indicated by patients Functional Deficits. 100% MET  4. Patient will return to raising up off floor with no assist functional activities without increased symptoms or restriction. 75% MET  5.  Min pain with sleeping(patient specific functional goal) 100% MET    Progression Towards Functional goals:  [x] Patient is progressing as expected towards functional goals listed. [] Progression is slowed due to complexities listed. [] Progression has been slowed due to co-morbidities. [] Plan just implemented, too soon to assess goals progression  [] Other:     ASSESSMENT:  Patient tolerated well. ROM is doing well and strength is improved. Patient returned to gym and is doing well with no pain. Patient on hold for 2 weeks, follow up as needed.       Return to Play: (if applicable)   []  Stage 1: Intro to Strength   []  Stage 2: Return to Run and Strength   []  Stage 3: Return to Jump and Strength   []  Stage 4: Dynamic Strength and Agility   []  Stage 5: Sport Specific Training     []  Ready to Return to Play, Meets All Above Stages   []  Not Ready for Return to Sports   Comments:                         Treatment/Activity Tolerance:  [x] Patient tolerated treatment well [] Patient limited by fatique  [] Patient limited by pain  [] Patient limited by other medical complications  [] Other:     Prognosis: [x] Good [] Fair  [] Poor    Patient Requires Follow-up: [x] Yes  [] No      PLAN: See eval  [] Continue per plan of care [] Alter current plan (see comments)  [x] Plan of care initiated [] Hold pending MD visit [] Discharge    Electronically signed by: Felicia Councilman, PT

## 2018-01-04 ENCOUNTER — TELEPHONE (OUTPATIENT)
Dept: ORTHOPEDIC SURGERY | Age: 78
End: 2018-01-04

## 2018-01-04 NOTE — TELEPHONE ENCOUNTER
Patient is at a hospital ER in [facility]  Dr Lowell Kelley from [facility] see this patient for another issue but wants to know if Dr Lalitha Wilson could see patient tomorrow- has a ankle injury will need ORIF of her ankle.     Pls call Kiara Bellamy to let her know - [phone]

## 2018-01-05 ENCOUNTER — TELEPHONE (OUTPATIENT)
Dept: ORTHOPEDIC SURGERY | Age: 78
End: 2018-01-05

## 2018-01-05 ENCOUNTER — PAT TELEPHONE (OUTPATIENT)
Dept: PREADMISSION TESTING | Age: 78
End: 2018-01-05

## 2018-01-05 ENCOUNTER — TELEPHONE (OUTPATIENT)
Dept: CARDIOLOGY CLINIC | Age: 78
End: 2018-01-05

## 2018-01-05 VITALS — BODY MASS INDEX: 23.39 KG/M2 | WEIGHT: 137 LBS | HEIGHT: 64 IN

## 2018-01-05 RX ORDER — ACETAMINOPHEN 325 MG/1
650 TABLET ORAL EVERY 6 HOURS PRN
COMMUNITY
End: 2018-05-04

## 2018-01-05 RX ORDER — OXYCODONE HYDROCHLORIDE 5 MG/1
5-10 TABLET ORAL EVERY 6 HOURS PRN
COMMUNITY
Start: 2018-01-04 | End: 2018-01-11

## 2018-01-05 RX ORDER — CLINDAMYCIN PHOSPHATE 600 MG/50ML
600 INJECTION INTRAVENOUS
Status: CANCELLED | OUTPATIENT
Start: 2018-01-08 | End: 2018-01-08

## 2018-01-05 ASSESSMENT — PAIN DESCRIPTION - PAIN TYPE: TYPE: ACUTE PAIN

## 2018-01-05 ASSESSMENT — PAIN - FUNCTIONAL ASSESSMENT: PAIN_FUNCTIONAL_ASSESSMENT: 0-10

## 2018-01-05 ASSESSMENT — PAIN SCALES - GENERAL: PAINLEVEL_OUTOF10: 4

## 2018-01-05 ASSESSMENT — PAIN DESCRIPTION - ORIENTATION: ORIENTATION: RIGHT

## 2018-01-05 ASSESSMENT — PAIN DESCRIPTION - LOCATION: LOCATION: ANKLE

## 2018-01-05 NOTE — TELEPHONE ENCOUNTER
Per Pepe 30 I have faxed over medication list, demographics, and insurance cards to [facility]. We have not seen the patient in the office and have no orders at this time, this is something that would need to come from her PCP.

## 2018-01-05 NOTE — TELEPHONE ENCOUNTER
Calling to adv that pt is having ankle fracture repair on Monday 1/8/18 and they want to know if she can stop taking Xarelto.  Please call to adv thank you

## 2018-01-08 ENCOUNTER — TELEPHONE (OUTPATIENT)
Dept: ORTHOPEDIC SURGERY | Age: 78
End: 2018-01-08

## 2018-01-08 ENCOUNTER — HOSPITAL ENCOUNTER (OUTPATIENT)
Dept: SURGERY | Age: 78
Discharge: HOME | End: 2018-01-08
Attending: ORTHOPAEDIC SURGERY | Admitting: ORTHOPAEDIC SURGERY

## 2018-01-08 VITALS
WEIGHT: 137 LBS | TEMPERATURE: 97.1 F | OXYGEN SATURATION: 96 % | RESPIRATION RATE: 16 BRPM | HEIGHT: 64 IN | BODY MASS INDEX: 23.39 KG/M2 | DIASTOLIC BLOOD PRESSURE: 72 MMHG | HEART RATE: 100 BPM | SYSTOLIC BLOOD PRESSURE: 132 MMHG

## 2018-01-08 DIAGNOSIS — S82.851A CLOSED TRIMALLEOLAR FRACTURE OF RIGHT ANKLE, INITIAL ENCOUNTER: Primary | ICD-10-CM

## 2018-01-08 LAB
ANION GAP SERPL CALCULATED.3IONS-SCNC: 16 MMOL/L (ref 3–16)
BUN BLDV-MCNC: 14 MG/DL (ref 7–20)
CALCIUM SERPL-MCNC: 9.3 MG/DL (ref 8.3–10.6)
CHLORIDE BLD-SCNC: 103 MMOL/L (ref 99–110)
CO2: 22 MMOL/L (ref 21–32)
CREAT SERPL-MCNC: 0.7 MG/DL (ref 0.6–1.2)
GFR AFRICAN AMERICAN: >60
GFR NON-AFRICAN AMERICAN: >60
GLUCOSE BLD-MCNC: 107 MG/DL (ref 70–99)
HCT VFR BLD CALC: 39.7 % (ref 36–48)
HEMOGLOBIN: 13.1 G/DL (ref 12–16)
INR BLD: 1.1 (ref 0.85–1.15)
MCH RBC QN AUTO: 31.9 PG (ref 26–34)
MCHC RBC AUTO-ENTMCNC: 32.9 G/DL (ref 31–36)
MCV RBC AUTO: 96.8 FL (ref 80–100)
PDW BLD-RTO: 14.9 % (ref 12.4–15.4)
PLATELET # BLD: 214 K/UL (ref 135–450)
PMV BLD AUTO: 10.5 FL (ref 5–10.5)
POTASSIUM REFLEX MAGNESIUM: 4.2 MMOL/L (ref 3.5–5.1)
PROTHROMBIN TIME: 12.4 SEC (ref 9.6–13)
RBC # BLD: 4.1 M/UL (ref 4–5.2)
SODIUM BLD-SCNC: 141 MMOL/L (ref 136–145)
WBC # BLD: 11.3 K/UL (ref 4–11)

## 2018-01-08 PROCEDURE — 27822 TREATMENT OF ANKLE FRACTURE: CPT | Performed by: NURSE PRACTITIONER

## 2018-01-08 PROCEDURE — 27822 TREATMENT OF ANKLE FRACTURE: CPT | Performed by: ORTHOPAEDIC SURGERY

## 2018-01-08 PROCEDURE — 99214 OFFICE O/P EST MOD 30 MIN: CPT | Performed by: ORTHOPAEDIC SURGERY

## 2018-01-08 RX ORDER — OXYCODONE HYDROCHLORIDE AND ACETAMINOPHEN 5; 325 MG/1; MG/1
1 TABLET ORAL EVERY 6 HOURS PRN
Qty: 28 TABLET | Refills: 0 | Status: SHIPPED | OUTPATIENT
Start: 2018-01-08 | End: 2018-01-08

## 2018-01-08 RX ORDER — OXYCODONE HYDROCHLORIDE AND ACETAMINOPHEN 5; 325 MG/1; MG/1
2 TABLET ORAL PRN
Status: COMPLETED | OUTPATIENT
Start: 2018-01-08 | End: 2018-01-08

## 2018-01-08 RX ORDER — OXYCODONE HYDROCHLORIDE AND ACETAMINOPHEN 5; 325 MG/1; MG/1
1 TABLET ORAL EVERY 6 HOURS PRN
Qty: 28 TABLET | Refills: 0 | Status: SHIPPED | OUTPATIENT
Start: 2018-01-08 | End: 2018-01-15

## 2018-01-08 RX ORDER — SODIUM CHLORIDE 0.9 % (FLUSH) 0.9 %
10 SYRINGE (ML) INJECTION PRN
Status: DISCONTINUED | OUTPATIENT
Start: 2018-01-08 | End: 2018-01-09 | Stop reason: HOSPADM

## 2018-01-08 RX ORDER — CLINDAMYCIN PHOSPHATE 600 MG/50ML
600 INJECTION INTRAVENOUS
Status: DISCONTINUED | OUTPATIENT
Start: 2018-01-08 | End: 2018-01-08 | Stop reason: DRUGHIGH

## 2018-01-08 RX ORDER — SODIUM CHLORIDE 0.9 % (FLUSH) 0.9 %
10 SYRINGE (ML) INJECTION EVERY 12 HOURS SCHEDULED
Status: DISCONTINUED | OUTPATIENT
Start: 2018-01-08 | End: 2018-01-09 | Stop reason: HOSPADM

## 2018-01-08 RX ORDER — FENTANYL CITRATE 50 UG/ML
25 INJECTION, SOLUTION INTRAMUSCULAR; INTRAVENOUS EVERY 5 MIN PRN
Status: DISCONTINUED | OUTPATIENT
Start: 2018-01-08 | End: 2018-01-09 | Stop reason: HOSPADM

## 2018-01-08 RX ORDER — CLINDAMYCIN HYDROCHLORIDE 150 MG/1
300 CAPSULE ORAL 4 TIMES DAILY
Qty: 20 CAPSULE | Refills: 0 | Status: SHIPPED | OUTPATIENT
Start: 2018-01-08 | End: 2018-01-08

## 2018-01-08 RX ORDER — CLINDAMYCIN PHOSPHATE 900 MG/50ML
900 INJECTION INTRAVENOUS
Status: COMPLETED | OUTPATIENT
Start: 2018-01-08 | End: 2018-01-08

## 2018-01-08 RX ORDER — ONDANSETRON 2 MG/ML
4 INJECTION INTRAMUSCULAR; INTRAVENOUS
Status: ACTIVE | OUTPATIENT
Start: 2018-01-08 | End: 2018-01-08

## 2018-01-08 RX ORDER — SODIUM CHLORIDE 9 MG/ML
INJECTION, SOLUTION INTRAVENOUS CONTINUOUS
Status: DISCONTINUED | OUTPATIENT
Start: 2018-01-08 | End: 2018-01-09 | Stop reason: HOSPADM

## 2018-01-08 RX ORDER — CLINDAMYCIN HYDROCHLORIDE 300 MG/1
300 CAPSULE ORAL 4 TIMES DAILY
Qty: 20 CAPSULE | Refills: 0 | Status: SHIPPED | OUTPATIENT
Start: 2018-01-08 | End: 2018-01-13

## 2018-01-08 RX ORDER — OXYCODONE HYDROCHLORIDE AND ACETAMINOPHEN 5; 325 MG/1; MG/1
1 TABLET ORAL PRN
Status: COMPLETED | OUTPATIENT
Start: 2018-01-08 | End: 2018-01-08

## 2018-01-08 RX ADMIN — OXYCODONE HYDROCHLORIDE AND ACETAMINOPHEN 1 TABLET: 5; 325 TABLET ORAL at 13:50

## 2018-01-08 RX ADMIN — SODIUM CHLORIDE: 9 INJECTION, SOLUTION INTRAVENOUS at 09:23

## 2018-01-08 RX ADMIN — CLINDAMYCIN PHOSPHATE 900 MG: 900 INJECTION INTRAVENOUS at 09:47

## 2018-01-08 ASSESSMENT — PAIN SCALES - GENERAL
PAINLEVEL_OUTOF10: 6
PAINLEVEL_OUTOF10: 6
PAINLEVEL_OUTOF10: 0

## 2018-01-08 ASSESSMENT — PAIN DESCRIPTION - ORIENTATION: ORIENTATION: RIGHT

## 2018-01-08 ASSESSMENT — LIFESTYLE VARIABLES: SMOKING_STATUS: 0

## 2018-01-08 ASSESSMENT — PAIN DESCRIPTION - LOCATION: LOCATION: ANKLE

## 2018-01-08 ASSESSMENT — PAIN DESCRIPTION - PAIN TYPE: TYPE: SURGICAL PAIN

## 2018-01-08 ASSESSMENT — ENCOUNTER SYMPTOMS: SHORTNESS OF BREATH: 0

## 2018-01-08 ASSESSMENT — PAIN - FUNCTIONAL ASSESSMENT: PAIN_FUNCTIONAL_ASSESSMENT: 0-10

## 2018-01-08 NOTE — TELEPHONE ENCOUNTER
Called and spoke to Christelle Westbrook, he stated he wanted to let us know that visiting angels were going to be helping his mother and  That he has a meeting with them and his mother about her plan of care at home. He also asked that I call Bora Chong from [facility]. He stated that she had called him inregards to all the paperwork faxed the other day for the lady taking care of this was unable to come into work due to the weather. I called Bora Chong and left a message asking for her to call office back and making sure she was able to receive the information needed from the other woman in their office.

## 2018-01-08 NOTE — ANESTHESIA PRE-OP
[facility] Department of Anesthesiology  Pre-Anesthesia Evaluation/Consultation       Name:  Madonna Artis  : 1940  Age:  76 y.o. MRN:  [id number]  Date: 2018           Procedure (Scheduled):  orif right ankle  Surgeon:  Dr. Cody Bess      Allergies   Allergen Reactions    Flagyl [Metronidazole] Diarrhea    Omnicef Diarrhea    Cephalexin Diarrhea    Penicillins Hives     Patient Active Problem List   Diagnosis    Hyperlipidemia    TIA (transient ischemic attack)    Factor V Leiden ([address]    Closed right hip fracture (HCC)    Intertrochanteric fracture of right femur (HCC)    Right knee DJD    Long term current use of anticoagulant therapy    Left knee pain    Knee pain    Primary osteoarthritis of both knees    Transient cerebral ischemia    New onset seizure ([address]    HTN (hypertension), benign    Right hip pain     Past Medical History:   Diagnosis Date    Allergic     Anxiety     Arthritis     Depression     aniexty.  DVT (deep venous thrombosis) (HCC) age 59    LLE whie on hormone therapy    H/O migraine     Hx of blood clots     Hyperlipidemia     Movement disorder     osteoporosis    New onset seizure ([address]     vs tia-per daughter was r/o seizure    Unspecified cerebral artery occlusion with cerebral infarction     tia---2009, 10/30/2014    Unspecified diseases of blood and blood-forming organs     Factyor V Leiden    Vitamin D deficiency      Past Surgical History:   Procedure Laterality Date    CARPAL TUNNEL RELEASE      right    COLONOSCOPY      COSMETIC SURGERY      eye lids lifted about 10 years ago.  HIP SURGERY Right 4/21/15.     KNEE ARTHROSCOPY Bilateral     OTHER SURGICAL HISTORY Right     Gamma nail right hip    TONSILLECTOMY       Social History   Substance Use Topics    Smoking status: Never Smoker    Smokeless tobacco: Never Used    Alcohol use No     Medications  Current Outpatient EVERY DAY 90 tablet 3    Multiple Vitamins-Minerals (THERAPEUTIC MULTIVITAMIN-MINERALS) tablet Take 1 tablet by mouth daily      alendronate (FOSAMAX) 70 MG tablet Take 70 mg by mouth every 7 days Takes on Saturday      Omega-3 Fatty Acids (FISH OIL) 1200 MG CAPS Take 1 tablet by mouth daily.  Coenzyme Q10-Levocarnitine (CO Q-10 PLUS PO) Take 100 mg by mouth daily       vitamin D (CHOLECALCIFEROL) 400 UNIT TABS tablet Take 5,000 Units by mouth daily. Current Facility-Administered Medications   Medication Dose Route Frequency Provider Last Rate Last Dose    0.9 % sodium chloride infusion   Intravenous Continuous Yanci Meneess MD        sodium chloride flush 0.9 % injection 10 mL  10 mL Intravenous 2 times per day Yanci Meneses MD        sodium chloride flush 0.9 % injection 10 mL  10 mL Intravenous PRN Yanci Meneses MD        famotidine (PEPCID) injection 20 mg  20 mg Intravenous Once Yanci Meneses MD        clindamycin (CLEOCIN) 900 mg in dextrose 5 % 50 mL IVPB  900 mg Intravenous On Call to 6135 Abran Monteiro MD         Vital Signs (Current)   Vitals:    18 0854   BP: 134/85   Pulse: 91   Resp: 16   Temp: 98.4 °F (36.9 °C)   SpO2: 97%     Vital Signs Statistics (for past 48 hrs)     Temp  Av.4 °F (36.9 °C)  Min: 98.4 °F (36.9 °C)   Min taken time: 1854  Max: 98.4 °F (36.9 °C)   Max taken time: 18  Pulse  Av  Min: 80   Min taken time: 1854  Max: 91   Max taken time: 18 0854  Resp  Av  Min: 12   Min taken time: 18  Max: 12   Max taken time: 18  BP  Min: 134/85   Min taken time: 1854  Max: 134/85   Max taken time: 18  SpO2  Av %  Min: 97 %   Min taken time: 18  Max: 97 %   Max taken time: 18    BP Readings from Last 3 Encounters:   18 134/85   11/22/17 126/80   17 118/70     BMI  Body mass index is 23.52 kg/m².   Estimated body mass index is 23.52 kg/m² as Cardiovascular:    (+) hypertension:, hyperlipidemia    (-) valvular problems/murmurs, past MI, CAD, CABG/stent, dysrhythmias,  angina,  CHF and murmur      Rhythm: regular  Rate: normal  Echocardiogram reviewed                  Neuro/Psych:   (+) CVA:, TIA (2009, 2014), headaches: migraine headaches, psychiatric history:depression/anxiety    (-) seizures and neuromuscular disease           GI/Hepatic/Renal:        (-) GERD, PUD, hepatitis, liver disease and no renal disease       Endo/Other:    (+) blood dyscrasia (off since friday): anticoagulation therapy and Factor V, arthritis:., .    (-) hypothyroidism, hyperthyroidism, no Type II DM, no Type I DM               Abdominal:           Vascular:                                        Anesthesia Plan      general     ASA 3       Induction: intravenous. MIPS: Postoperative opioids intended and Prophylactic antiemetics administered. Anesthetic plan and risks discussed with patient and child/children. Plan discussed with CRNA. This pre-anesthesia assessment may be used as a history and physical.    DOS STAFF ADDENDUM:    Pt seen and examined, chart reviewed (including anesthesia, drug and allergy history). No interval changes to history and physical examination. Anesthetic plan, risks, benefits, alternatives, and personnel involved discussed with patient. Patient verbalized an understanding and agrees to proceed.       Clay Fletcher MD  January 8, 2018  9:02 AM

## 2018-01-09 LAB
EKG ATRIAL RATE: 91 BPM
EKG DIAGNOSIS: NORMAL
EKG P AXIS: 56 DEGREES
EKG P-R INTERVAL: 174 MS
EKG Q-T INTERVAL: 378 MS
EKG QRS DURATION: 96 MS
EKG QTC CALCULATION (BAZETT): 464 MS
EKG R AXIS: -19 DEGREES
EKG T AXIS: 3 DEGREES
EKG VENTRICULAR RATE: 91 BPM

## 2018-01-09 NOTE — OP NOTE
[address] [address]                                 OPERATIVE REPORT    PATIENT NAME: Ab Gleason                  :        1940  MED REC NO:   [id number]                          ROOM:  ACCOUNT NO:   [de-identified]                          ADMIT DATE: 2018  PROVIDER:     Vahe Ulloa MD      DATE OF PROCEDURE:  2018    PRIMARY CARE PHYSICIAN:  Brennon Bermeo MD    PREOPERATIVE DIAGNOSIS:  Right ankle trimalleolar displaced fracture. POSTOPERATIVE DIAGNOSIS:  Right ankle trimalleolar displaced fracture. OPERATION PERFORMED:  Open treatment of right ankle trimalleolar fracture  with open reduction and internal fixation of the medial and lateral  malleolus. SURGEON:  Vahe Ulloa MD    ASSISTANT:  Denece Dancer, CNP    ANESTHESIA:  General anesthesia. ESTIMATED BLOOD LOSS:  Minimal.    COMPLICATIONS:  None. TOURNIQUET:  Right upper thigh, 350 mmHg. IMPLANTS USED:  1. Della distal fibular locking plate. 2.  Della 4.0 partially threaded cannulated screws to the medial  malleolus. INDICATIONS:  This is a 68-year-old white female, well known to me in the  past where she had a gamma nail done in . She sustained a fall with  the right ankle displaced fractures, and she was seen at an outside  facility. She was then sent to me for a consultation and surgical  treatment. All the risks, benefits, and alternatives were discussed with  the patient and her son, and they agreed to proceed with the surgical  treatment. OPERATIVE PROCEDURE:  The patient's right ankle was marked. She received  900 mg of clindamycin IV preoperatively. The patient was then brought to  the operating room and underwent general anesthesia. A well-padded  tourniquet was placed, right upper thigh. The right lower extremity was  then prepped and draped in regular sterile routine fashion.   A

## 2018-01-09 NOTE — CONSULTS
TONSILLECTOMY         Current Medications:   No current facility-administered medications for this encounter. Allergies:  Flagyl [metronidazole]; Omnicef; Cephalexin; and Penicillins    Social History     Social History    Marital status:      Spouse name: N/A    Number of children: 2    Years of education: N/A     Occupational History    Not on file. Social History Main Topics    Smoking status: Never Smoker    Smokeless tobacco: Never Used    Alcohol use No    Drug use: No    Sexual activity: Not on file     Other Topics Concern    Not on file     Social History Narrative    No narrative on file       Family History   Problem Relation Age of Onset    Stroke Mother     Arthritis Mother     Depression Mother     Heart Disease Father     Asthma Father     Heart Disease Sister     Heart Disease Brother        REVIEW OF SYSTEMS:   CONSTITUTIONAL: Denies unexplained weight loss, fevers, chills or fatigue  NEUROLOGICAL: Denies unsteady gait or progressive weakness    PSYCHOLOGICAL: Denies anxiety, depression   SKIN: Denies skin changes, delayed healing, rash, itching   HEMATOLOGIC: Denies easy bleeding or bruising  ENDOCRINE: Denies excessive thirst, urination, heat/cold  RESPIRATORY: Denies current dyspnea, cough  CARDIOVASCULAR: Negative for chest pain at this time. EYES: Negative for photophobia and visual disturbance. ENT:  Negative for rhinorrhea, epistaxis, sore throat, or hearing loss. GI: Denies nausea, vomiting, diarrhea   : Denies bowel or bladder issues   MUSCULOSKELETAL: Right ankle pain. All other ROS reviewed in chart or with patient or family and are grossly negative. PHYSICAL EXAMINATION:  Ms. Jony Pelletier is a very pleasant 76 y.o. female who seen today in no acute distress, awake, alert, and oriented. She is well nourished and groomed. Patient with normal affect. Body mass index is 23.52 kg/m². . Skin warm and dry. Resting respiratory rate is 16.   Resp deep and including both surgical and non-surgical treatment, and that my recommendation would be an open reduction and internal fixation given the amount of displacement and comminution of the fracture. I discussed the risks and benefits of surgery with the patient, including but not limited to infection, bleeding, pain, injury to nerves or blood vessels failure of the surgery and need for additional surgery. All the patient's questions were answered. We discussed an expected post-operative course. She  is understanding of this and wishes to proceed. Surgery today    Thank you very much for the kind consultation and allowing me to participate in this patient's care. I will continue to keep you apprised of her progress.          Debora Jiménez MD   1/8/2018  9:30 am

## 2018-01-11 ENCOUNTER — TELEPHONE (OUTPATIENT)
Dept: ORTHOPEDIC SURGERY | Age: 78
End: 2018-01-11

## 2018-01-23 ENCOUNTER — OFFICE VISIT (OUTPATIENT)
Dept: ORTHOPEDIC SURGERY | Age: 78
End: 2018-01-23

## 2018-01-23 VITALS
DIASTOLIC BLOOD PRESSURE: 70 MMHG | HEART RATE: 82 BPM | HEIGHT: 64 IN | WEIGHT: 137 LBS | SYSTOLIC BLOOD PRESSURE: 114 MMHG | RESPIRATION RATE: 15 BRPM | BODY MASS INDEX: 23.39 KG/M2

## 2018-01-23 DIAGNOSIS — M17.12 PRIMARY OSTEOARTHRITIS OF LEFT KNEE: Primary | ICD-10-CM

## 2018-01-23 DIAGNOSIS — S82.851A CLOSED TRIMALLEOLAR FRACTURE OF RIGHT ANKLE, INITIAL ENCOUNTER: ICD-10-CM

## 2018-01-23 PROCEDURE — 1036F TOBACCO NON-USER: CPT | Performed by: ORTHOPAEDIC SURGERY

## 2018-01-23 PROCEDURE — 20610 DRAIN/INJ JOINT/BURSA W/O US: CPT | Performed by: ORTHOPAEDIC SURGERY

## 2018-01-23 PROCEDURE — G8427 DOCREV CUR MEDS BY ELIG CLIN: HCPCS | Performed by: ORTHOPAEDIC SURGERY

## 2018-01-23 PROCEDURE — 1123F ACP DISCUSS/DSCN MKR DOCD: CPT | Performed by: ORTHOPAEDIC SURGERY

## 2018-01-23 PROCEDURE — 73610 X-RAY EXAM OF ANKLE: CPT | Performed by: ORTHOPAEDIC SURGERY

## 2018-01-23 PROCEDURE — 4040F PNEUMOC VAC/ADMIN/RCVD: CPT | Performed by: ORTHOPAEDIC SURGERY

## 2018-01-23 PROCEDURE — 1090F PRES/ABSN URINE INCON ASSESS: CPT | Performed by: ORTHOPAEDIC SURGERY

## 2018-01-23 PROCEDURE — L4361 PNEUMA/VAC WALK BOOT PRE OTS: HCPCS | Performed by: ORTHOPAEDIC SURGERY

## 2018-01-23 PROCEDURE — G8484 FLU IMMUNIZE NO ADMIN: HCPCS | Performed by: ORTHOPAEDIC SURGERY

## 2018-01-23 PROCEDURE — G8420 CALC BMI NORM PARAMETERS: HCPCS | Performed by: ORTHOPAEDIC SURGERY

## 2018-01-23 PROCEDURE — G8400 PT W/DXA NO RESULTS DOC: HCPCS | Performed by: ORTHOPAEDIC SURGERY

## 2018-01-23 PROCEDURE — 99213 OFFICE O/P EST LOW 20 MIN: CPT | Performed by: ORTHOPAEDIC SURGERY

## 2018-01-23 NOTE — LETTER
[facility] [address] [address]  Phone: [phone]  Fax: [phone]    Deisi Orr        February 1, 2018       Patient: Gaby Phillips   MR Number: [id number]   YOB: 1940   Date of Visit: 1/23/2018       Dear Dr. Karli Castro: Thank you for the request for consultation for Jhony Hoskins to me for evaluation. Below are the relevant portions of my assessment and plan of care. CHIEF COMPLAINT:   1-Right ankle trimalleolar displaced fracture, status post ORIF. 2-Left knee pain/osteoarthritis-new. HISTORY:   Ms. Bryce Rocha 76 y.o.  female who came in today for 2 weeks postoperative visit. The patient denies any significant pain in the right ankle. Rates pain a 2/10 VAS improving, intermittent throbbing achy. She has been in a splint, and non WB. No numbness or tingling sensation. No fever or Chills. She is at home working with [employer]. She also presents today for the first visit for evaluation of left knee pain which started to worsen since she has been NWB on the right and putting more pressure on the left.  She is complaining of achy pain. Pain is increase with standing and walking and decrease with rest. Rates pain a 5/10 VAS intermittent achy. Pain is sharp early in the morning with first few steps, dull achy pain by the end of the day. Alleviating factors: elevation and rest. No radiation and no numbness and tingling sensation. She has had this problem in the past and had Cortisone injections by Dr Christina Pérez    Past Medical History:   Diagnosis Date    Allergic     Anxiety     Arthritis     Depression     aniexty.     DVT (deep venous thrombosis) ([facility]) age 59    LLE whie on hormone therapy    H/O migraine     Hx of blood clots     Hyperlipidemia     Movement disorder     osteoporosis    New onset seizure ([address]     vs tia-per daughter was r/o seizure  Unspecified cerebral artery occlusion with cerebral infarction     tia---march 2009, 10/30/2014    Unspecified diseases of blood and blood-forming organs     Factyor V Leiden    Vitamin D deficiency        Past Surgical History:   Procedure Laterality Date    CARPAL TUNNEL RELEASE      right    COLONOSCOPY      COSMETIC SURGERY      eye lids lifted about 10 years ago.  FRACTURE SURGERY  01/08/2018    orif rt ankle    HIP SURGERY Right 4/21/15.  KNEE ARTHROSCOPY Bilateral     OTHER SURGICAL HISTORY Right     Gamma nail right hip    TONSILLECTOMY         Social History     Social History    Marital status:      Spouse name: N/A    Number of children: 2    Years of education: N/A     Occupational History    Not on file. Social History Main Topics    Smoking status: Never Smoker    Smokeless tobacco: Never Used    Alcohol use No    Drug use: No    Sexual activity: Not on file     Other Topics Concern    Not on file     Social History Narrative    No narrative on file       Family History   Problem Relation Age of Onset    Stroke Mother     Arthritis Mother     Depression Mother     Heart Disease Father     Asthma Father     Heart Disease Sister     Heart Disease Brother        Current Outpatient Prescriptions on File Prior to Visit   Medication Sig Dispense Refill    Calcium Carb-Cholecalciferol (CALCIUM-VITAMIN D) 500-200 MG-UNIT per tablet Take 1 tablet by mouth      acetaminophen (TYLENOL) 325 MG tablet Take 650 mg by mouth as needed for Pain      XARELTO 20 MG TABS tablet TAKE 1 TABLET EVERY DAY 90 tablet 3    traZODone (DESYREL) 50 MG tablet Take 100 mg by mouth nightly       Multiple Vitamins-Minerals (THERAPEUTIC MULTIVITAMIN-MINERALS) tablet Take 1 tablet by mouth daily      LORazepam (ATIVAN) 0.5 MG tablet Take 0.5 mg by mouth as needed for Anxiety .       alendronate (FOSAMAX) 70 MG tablet Take 70 mg by mouth every 7 days Takes on Saturday  Omega-3 Fatty Acids (FISH OIL) 1200 MG CAPS Take 1 tablet by mouth daily.  pravastatin (PRAVACHOL) 80 MG tablet Take 80 mg by mouth daily.  Coenzyme Q10-Levocarnitine (CO Q-10 PLUS PO) Take 100 mg by mouth daily       citalopram (CELEXA) 10 MG tablet Take 10 mg by mouth daily       vitamin D (CHOLECALCIFEROL) 400 UNIT TABS tablet Take 5,000 Units by mouth daily. No current facility-administered medications on file prior to visit. Pertinent items are noted in HPI  Review of systems reviewed from Patient History Form dated on 1/23/2018 and available in the patient's chart under the Media tab. PHYSICAL EXAMINATION:  Ms. Kristina Ibrahim is a very pleasant 76 y.o.  female who presents today in no acute distress, awake, alert, and oriented. She is well dressed, nourished and  groomed. Patient with normal affect. Height is  5' 4\" (1.626 m), weight is 137 lb (62.1 kg), Body mass index is 23.52 kg/m². Resting respiratory rate is 16. Examination of the gait, showed that the patient walks heel-toe with a non-antalgic gait and no limp.  Examination of both knees showing full ROM, left mild crepitus, tenderness on medial joint line, stable to varus and valgus stress. She has intact sensation and good pedal pulses. She has good strength in 2 planes, and has mild tenderness on deep palpation over the medial joint line. Knee reflex 1+ bilaterally. IMAGING:   Three views right ankle  taken today in the office showed anatomic alignment of the fracture, plate and screws in good position, no loosening. Ankle mortise is well centered. IMPRESSION:   1-right ankle trimalleolar displaced fracture,  ORIF  2-Left knee DJD. PLAN: For the ankle:  She placed in a boot, and non WB for 6 weeks. I have told the patient to work on ROM, The patient will come back for a follow up in 6 weeks. At that time, we will take 3 views of the right ankle standing. Boo Govea [address]: [phone]

## 2018-01-23 NOTE — PROGRESS NOTES
CHIEF COMPLAINT:   1-Right ankle trimalleolar displaced fracture, status post ORIF. 2-Left knee pain/osteoarthritis-new. HISTORY:   Ms. Cait Banks 76 y.o.  female who came in today for 2 weeks postoperative visit. The patient denies any significant pain in the right ankle. Rates pain a 2/10 VAS improving, intermittent throbbing achy. She has been in a splint, and non WB. No numbness or tingling sensation. No fever or Chills. She is at home working with [employer]. She also presents today for the first visit for evaluation of left knee pain which started to worsen since she has been NWB on the right and putting more pressure on the left.  She is complaining of achy pain. Pain is increase with standing and walking and decrease with rest. Rates pain a 5/10 VAS intermittent achy. Pain is sharp early in the morning with first few steps, dull achy pain by the end of the day. Alleviating factors: elevation and rest. No radiation and no numbness and tingling sensation. She has had this problem in the past and had Cortisone injections by Dr Rhona Shen    Past Medical History:   Diagnosis Date    Allergic     Anxiety     Arthritis     Depression     aniexty.  DVT (deep venous thrombosis) ([facility]) age 59    LLE whie on hormone therapy    H/O migraine     Hx of blood clots     Hyperlipidemia     Movement disorder     osteoporosis    New onset [facility] [address] [address]     vs tia-per daughter was r/o seizure    Unspecified cerebral artery occlusion with cerebral infarction     tia---march 2009, 10/30/2014    Unspecified diseases of blood and blood-forming organs     Factyor V Leiden    Vitamin D deficiency        Past Surgical History:   Procedure Laterality Date    CARPAL TUNNEL RELEASE      right    COLONOSCOPY      COSMETIC SURGERY      eye lids lifted about 10 years ago.  FRACTURE SURGERY  01/08/2018    orif rt ankle    HIP SURGERY Right 4/21/15.     KNEE ARTHROSCOPY Bilateral     OTHER SURGICAL HISTORY Right

## 2018-01-31 ENCOUNTER — TELEPHONE (OUTPATIENT)
Dept: ORTHOPEDIC SURGERY | Age: 78
End: 2018-01-31

## 2018-01-31 NOTE — TELEPHONE ENCOUNTER
Daly Stallion calling- patient's  son.   He is asking for a call back from Kasandra  He will be going into a meeting at 4:30 so says if Kasandra can dave back before than would be great- if not he will try to break away    Pls call his cell # at [phone]

## 2018-01-31 NOTE — TELEPHONE ENCOUNTER
Patients son is calling to get clarification. He states that the pt is having trouble kneeling on the scooter so the therapist is having her hop down the hallway using a walker. He is very concerned and woulsl like to speak to someone regarding this. He is concerned that she is hurting her left knee and she is hopping and putting pressure on this knee.      Please call son Ellen Smith with clarification for the home therapist.  Please call   [phone]

## 2018-01-31 NOTE — TELEPHONE ENCOUNTER
Spoke with Jr Ibanez and advised that I talked with Raman Hill, she states that she does not have the concern of that Jr Ibanez has. I told Jr Ibanez this he states there is a call scheduled with Raman Hill at 6:30. They are going to discuss this issue at this time.

## 2018-02-01 ENCOUNTER — HOSPITAL ENCOUNTER (OUTPATIENT)
Dept: OTHER | Age: 78
Discharge: HOME | End: 2018-02-28
Attending: ORTHOPAEDIC SURGERY | Admitting: ORTHOPAEDIC SURGERY

## 2018-02-01 NOTE — COMMUNICATION BODY
Gamma nail right hip    TONSILLECTOMY         Social History     Social History    Marital status:      Spouse name: N/A    Number of children: 2    Years of education: N/A     Occupational History    Not on file. Social History Main Topics    Smoking status: Never Smoker    Smokeless tobacco: Never Used    Alcohol use No    Drug use: No    Sexual activity: Not on file     Other Topics Concern    Not on file     Social History Narrative    No narrative on file       Family History   Problem Relation Age of Onset    Stroke Mother     Arthritis Mother     Depression Mother     Heart Disease Father     Asthma Father     Heart Disease Sister     Heart Disease Brother        Current Outpatient Prescriptions on File Prior to Visit   Medication Sig Dispense Refill    Calcium Carb-Cholecalciferol (CALCIUM-VITAMIN D) 500-200 MG-UNIT per tablet Take 1 tablet by mouth      acetaminophen (TYLENOL) 325 MG tablet Take 650 mg by mouth as needed for Pain      XARELTO 20 MG TABS tablet TAKE 1 TABLET EVERY DAY 90 tablet 3    traZODone (DESYREL) 50 MG tablet Take 100 mg by mouth nightly       Multiple Vitamins-Minerals (THERAPEUTIC MULTIVITAMIN-MINERALS) tablet Take 1 tablet by mouth daily      LORazepam (ATIVAN) 0.5 MG tablet Take 0.5 mg by mouth as needed for Anxiety .  alendronate (FOSAMAX) 70 MG tablet Take 70 mg by mouth every 7 days Takes on Saturday      Omega-3 Fatty Acids (FISH OIL) 1200 MG CAPS Take 1 tablet by mouth daily.  pravastatin (PRAVACHOL) 80 MG tablet Take 80 mg by mouth daily.  Coenzyme Q10-Levocarnitine (CO Q-10 PLUS PO) Take 100 mg by mouth daily       citalopram (CELEXA) 10 MG tablet Take 10 mg by mouth daily       vitamin D (CHOLECALCIFEROL) 400 UNIT TABS tablet Take 5,000 Units by mouth daily. No current facility-administered medications on file prior to visit.         Pertinent items are noted in HPI  Review of systems reviewed from Patient History tolerated the procedure well. A band-aid was applied and the patient was advised to ice the knee for 15-20 minutes to relieve any injection site related pain. She reported a good improvement immediatly after the injection. F/u in 6 weeks, PT if needed. She understands that this may need surgery if the pain did not to resolve. Procedures    Breg Tall Genisus Walking Boot     Patient was prescribed a Breg Tall Genisus Walking Boot. The right ankle will require stabilization / immobilization from this semi-rigid / rigid orthosis to improve their function. The orthosis will assist in protecting the affected area, provide functional support and facilitate healing. The patient was educated and fit by a healthcare professional with expert knowledge and specialization in brace application while under the direct supervision of the physician. Verbal and written instructions for the use of and application of this item were provided. They were instructed to contact the office immediately should the brace result in increased pain, decreased sensation, increased swelling or worsening of the condition.        Ramone Chandler MD

## 2018-02-07 ENCOUNTER — TELEPHONE (OUTPATIENT)
Dept: ORTHOPEDIC SURGERY | Age: 78
End: 2018-02-07

## 2018-02-07 NOTE — TELEPHONE ENCOUNTER
pls call Re Randhawa with the [facility] [phone]  Pt is NWB and 4 weeks post op so wanting to know if it would be ok to work on non weight bearing strengthening exercises

## 2018-02-08 ENCOUNTER — TELEPHONE (OUTPATIENT)
Dept: CARDIOLOGY CLINIC | Age: 78
End: 2018-02-08

## 2018-02-08 ENCOUNTER — TELEPHONE (OUTPATIENT)
Dept: ORTHOPEDIC SURGERY | Age: 78
End: 2018-02-08

## 2018-02-08 PROBLEM — K52.9 ACUTE COLITIS: Status: ACTIVE | Noted: 2018-02-08

## 2018-02-08 NOTE — TELEPHONE ENCOUNTER
Spoke with son, transfer being arranged.  I discussed with him that may take some time for bed to free up

## 2018-02-09 PROBLEM — E83.42 HYPOMAGNESEMIA: Status: ACTIVE | Noted: 2018-02-09

## 2018-02-09 PROBLEM — K92.2 LGI BLEED: Status: ACTIVE | Noted: 2018-02-09

## 2018-02-09 PROBLEM — S82.851D CLOSED DISPLACED TRIMALLEOLAR FRACTURE OF RIGHT ANKLE WITH ROUTINE HEALING: Status: ACTIVE | Noted: 2018-02-09

## 2018-02-09 PROBLEM — F41.9 ANXIETY AND DEPRESSION: Status: ACTIVE | Noted: 2018-02-09

## 2018-02-09 PROBLEM — D72.829 LEUKOCYTOSIS: Status: ACTIVE | Noted: 2018-02-09

## 2018-02-09 PROBLEM — F32.A ANXIETY AND DEPRESSION: Status: ACTIVE | Noted: 2018-02-09

## 2018-02-09 PROBLEM — Z86.73 HISTORY OF TIA (TRANSIENT ISCHEMIC ATTACK) AND STROKE: Status: ACTIVE | Noted: 2018-02-09

## 2018-02-12 ENCOUNTER — TELEPHONE (OUTPATIENT)
Dept: CARDIOLOGY CLINIC | Age: 78
End: 2018-02-12

## 2018-02-12 NOTE — TELEPHONE ENCOUNTER
Pt son's called to express his sincere gratitude for all the LES did to get the pt here to [facility] and for his care and the care that his associates have given.     Please adv, thank you [facility]

## 2018-02-23 ENCOUNTER — TELEPHONE (OUTPATIENT)
Dept: ORTHOPEDIC SURGERY | Age: 78
End: 2018-02-23

## 2018-02-23 NOTE — TELEPHONE ENCOUNTER
Pt son Fritz Mancuso is calling about his mom Bibiana campos. He is wondering how much weight she should start with. Is it toe touch or heel first.  Please call Fritz Mancuso and he said to leave a voice mail if he doesn't answer.   Please advise

## 2018-02-28 ENCOUNTER — TELEPHONE (OUTPATIENT)
Dept: ORTHOPEDIC SURGERY | Age: 78
End: 2018-02-28

## 2018-03-01 ENCOUNTER — OFFICE VISIT (OUTPATIENT)
Dept: ORTHOPEDIC SURGERY | Age: 78
End: 2018-03-01

## 2018-03-01 VITALS
BODY MASS INDEX: 22.49 KG/M2 | DIASTOLIC BLOOD PRESSURE: 64 MMHG | SYSTOLIC BLOOD PRESSURE: 113 MMHG | RESPIRATION RATE: 16 BRPM | WEIGHT: 135 LBS | HEIGHT: 65 IN | HEART RATE: 80 BPM

## 2018-03-01 DIAGNOSIS — S82.851A CLOSED TRIMALLEOLAR FRACTURE OF RIGHT ANKLE, INITIAL ENCOUNTER: Primary | ICD-10-CM

## 2018-03-01 PROCEDURE — 99024 POSTOP FOLLOW-UP VISIT: CPT | Performed by: NURSE PRACTITIONER

## 2018-03-05 NOTE — PROGRESS NOTES
Relation Age of Onset    Stroke Mother     Arthritis Mother     Depression Mother     Heart Disease Father     Asthma Father     Heart Disease Sister     Heart Disease Brother      Social History     Social History    Marital status:      Spouse name: N/A    Number of children: 2    Years of education: N/A     Occupational History    Not on file. Social History Main Topics    Smoking status: Never Smoker    Smokeless tobacco: Never Used    Alcohol use No    Drug use: No    Sexual activity: Not on file     Other Topics Concern    Not on file     Social History Narrative    No narrative on file     Current Outpatient Prescriptions   Medication Sig Dispense Refill    oxyCODONE (ROXICODONE) 5 MG immediate release tablet Take 5-10 mg by mouth every 6 hours as needed for Pain.  polyethylene glycol (GLYCOLAX) powder Take 17 g by mouth daily      Calcium Carb-Cholecalciferol (CALCIUM-VITAMIN D) 500-200 MG-UNIT per tablet Take 1 tablet by mouth      acetaminophen (TYLENOL) 325 MG tablet Take 650 mg by mouth every 6 hours as needed for Pain       XARELTO 20 MG TABS tablet TAKE 1 TABLET EVERY DAY 90 tablet 3    traZODone (DESYREL) 50 MG tablet Take 100 mg by mouth nightly       Multiple Vitamins-Minerals (THERAPEUTIC MULTIVITAMIN-MINERALS) tablet Take 1 tablet by mouth daily      LORazepam (ATIVAN) 0.5 MG tablet Take 0.5 mg by mouth as needed for Anxiety .  alendronate (FOSAMAX) 70 MG tablet Take 70 mg by mouth every 7 days Takes on Saturday      Omega-3 Fatty Acids (FISH OIL) 1200 MG CAPS Take 1 tablet by mouth daily.  pravastatin (PRAVACHOL) 80 MG tablet Take 80 mg by mouth daily.  Coenzyme Q10-Levocarnitine (CO Q-10 PLUS PO) Take 100 mg by mouth daily       citalopram (CELEXA) 10 MG tablet Take 10 mg by mouth daily       vitamin D (CHOLECALCIFEROL) 400 UNIT TABS tablet Take 5,000 Units by mouth daily.        No current facility-administered medications for this visit. Pertinent items are noted in HPI  Review of systems reviewed from Patient History Form dated on 1/23/2018 and available in the patient's chart under the Media tab. PHYSICAL EXAMINATION:  Ms. Rashmi Maradiaga is a very pleasant 76 y.o.  female who presents today in no acute distress, awake, alert, and oriented. She is well dressed, nourished and  groomed. Patient with normal affect. Height is  5' 5\" (1.651 m), weight is 135 lb (61.2 kg), Body mass index is 22.47 kg/m². Resting respiratory rate is 16. Examination of the gait, showed that the patient walks WB in boot. The incision is healed well with no signs of erythema, drainage, mild swelling. Decreased ROM right ankle. Examination of both knees showing full ROM, left mild crepitus, tenderness on medial joint line, stable to varus and valgus stress. She has intact sensation and good pedal pulses. She has good strength in 2 planes, and has mild to no tenderness on deep palpation over the medial joint line. Knee reflex 1+ bilaterally. IMAGING:   Three views right ankle taken today in the office showed anatomic alignment of the fracture, plate and screws in good position, no loosening. Ankle mortise is well centered. IMPRESSION:   1-Right ankle trimalleolar displaced fracture,  ORIF  2-Left knee DJD-improved. PLAN: For the ankle:  She can discontinue the boot, WBAT. I have told the patient to work on ROM and strengthening exercises with PT. No heavy impact activities. The patient will come back for a follow up in 6 weeks. At that time, we will take 3 views of the right ankle standing. For the knee: I discussed with the patient the treatment options including both surgical and non-surgical treatment. We recommended Quad exercises and stretching of the calf and hamstrings which was taught to the patient today. F/u PRN, PT or will consider repeat Cortisone injection if needed.  She understands that this may need surgery if the

## 2018-03-16 ENCOUNTER — TELEPHONE (OUTPATIENT)
Dept: CARDIOLOGY CLINIC | Age: 78
End: 2018-03-16

## 2018-03-16 NOTE — TELEPHONE ENCOUNTER
Wants to know if LES could recommend a geriatric psychologist for this patient . Son is trying to get more cognitive testing done for her .knows he wont get a call until Monday .

## 2018-03-23 PROBLEM — R41.82 ALTERED MENTAL STATE: Status: ACTIVE | Noted: 2018-03-23

## 2018-03-23 PROBLEM — K52.9 COLITIS: Status: ACTIVE | Noted: 2018-03-23

## 2018-03-23 PROBLEM — E43 SEVERE MALNUTRITION (HCC): Status: ACTIVE | Noted: 2018-03-23

## 2018-03-23 PROBLEM — F32.A DEPRESSION: Status: ACTIVE | Noted: 2018-03-23

## 2018-03-24 PROBLEM — E43 SEVERE MALNUTRITION (HCC): Status: ACTIVE | Noted: 2018-03-24

## 2018-03-24 PROBLEM — G93.41 METABOLIC ENCEPHALOPATHY: Status: ACTIVE | Noted: 2018-03-24

## 2018-04-02 ENCOUNTER — TELEPHONE (OUTPATIENT)
Dept: CARDIOLOGY CLINIC | Age: 78
End: 2018-04-02

## 2018-04-11 ENCOUNTER — TELEPHONE (OUTPATIENT)
Dept: ORTHOPEDIC SURGERY | Age: 78
End: 2018-04-11

## 2018-04-12 ENCOUNTER — HOSPITAL ENCOUNTER (OUTPATIENT)
Dept: OTHER | Age: 78
Discharge: HOME | End: 2018-04-30
Attending: FAMILY MEDICINE | Admitting: FAMILY MEDICINE

## 2018-04-12 ENCOUNTER — OFFICE VISIT (OUTPATIENT)
Dept: ORTHOPEDIC SURGERY | Age: 78
End: 2018-04-12

## 2018-04-12 VITALS
RESPIRATION RATE: 16 BRPM | BODY MASS INDEX: 19.49 KG/M2 | HEIGHT: 65 IN | SYSTOLIC BLOOD PRESSURE: 132 MMHG | WEIGHT: 117 LBS | HEART RATE: 100 BPM | DIASTOLIC BLOOD PRESSURE: 69 MMHG

## 2018-04-12 DIAGNOSIS — M17.11 PRIMARY OSTEOARTHRITIS OF RIGHT KNEE: Primary | ICD-10-CM

## 2018-04-12 DIAGNOSIS — S82.851A CLOSED TRIMALLEOLAR FRACTURE OF RIGHT ANKLE, INITIAL ENCOUNTER: ICD-10-CM

## 2018-04-12 PROCEDURE — 20610 DRAIN/INJ JOINT/BURSA W/O US: CPT | Performed by: ORTHOPAEDIC SURGERY

## 2018-04-12 PROCEDURE — 1123F ACP DISCUSS/DSCN MKR DOCD: CPT | Performed by: ORTHOPAEDIC SURGERY

## 2018-04-12 PROCEDURE — 1111F DSCHRG MED/CURRENT MED MERGE: CPT | Performed by: ORTHOPAEDIC SURGERY

## 2018-04-12 PROCEDURE — G8427 DOCREV CUR MEDS BY ELIG CLIN: HCPCS | Performed by: ORTHOPAEDIC SURGERY

## 2018-04-12 PROCEDURE — G8420 CALC BMI NORM PARAMETERS: HCPCS | Performed by: ORTHOPAEDIC SURGERY

## 2018-04-12 PROCEDURE — G8400 PT W/DXA NO RESULTS DOC: HCPCS | Performed by: ORTHOPAEDIC SURGERY

## 2018-04-12 PROCEDURE — 1090F PRES/ABSN URINE INCON ASSESS: CPT | Performed by: ORTHOPAEDIC SURGERY

## 2018-04-12 PROCEDURE — 1036F TOBACCO NON-USER: CPT | Performed by: ORTHOPAEDIC SURGERY

## 2018-04-12 PROCEDURE — 99214 OFFICE O/P EST MOD 30 MIN: CPT | Performed by: ORTHOPAEDIC SURGERY

## 2018-04-12 PROCEDURE — 4040F PNEUMOC VAC/ADMIN/RCVD: CPT | Performed by: ORTHOPAEDIC SURGERY

## 2018-04-12 RX ORDER — MIRTAZAPINE 15 MG/1
7.5 TABLET, FILM COATED ORAL
COMMUNITY
Start: 2018-03-19 | End: 2019-02-25

## 2018-04-19 ENCOUNTER — HOSPITAL ENCOUNTER (OUTPATIENT)
Dept: PHYSICAL THERAPY | Age: 78
Discharge: HOME | End: 2018-04-30
Admitting: ORTHOPAEDIC SURGERY

## 2018-04-26 ENCOUNTER — TELEPHONE (OUTPATIENT)
Dept: ORTHOPEDIC SURGERY | Age: 78
End: 2018-04-26

## 2018-05-01 ENCOUNTER — HOSPITAL ENCOUNTER (OUTPATIENT)
Dept: OTHER | Age: 78
Discharge: HOME | End: 2018-05-31
Attending: FAMILY MEDICINE | Admitting: FAMILY MEDICINE

## 2018-05-01 ENCOUNTER — HOSPITAL ENCOUNTER (OUTPATIENT)
Dept: OTHER | Age: 78
Discharge: HOME | End: 2018-05-31
Attending: ORTHOPAEDIC SURGERY | Admitting: ORTHOPAEDIC SURGERY

## 2018-05-04 ENCOUNTER — TELEPHONE (OUTPATIENT)
Dept: ORTHOPEDIC SURGERY | Age: 78
End: 2018-05-04

## 2018-05-04 ENCOUNTER — TELEPHONE (OUTPATIENT)
Dept: CARDIOLOGY CLINIC | Age: 78
End: 2018-05-04

## 2018-05-07 ENCOUNTER — TELEPHONE (OUTPATIENT)
Dept: ORTHOPEDIC SURGERY | Age: 78
End: 2018-05-07

## 2018-05-08 ENCOUNTER — OFFICE VISIT (OUTPATIENT)
Dept: ORTHOPEDIC SURGERY | Age: 78
End: 2018-05-08

## 2018-05-08 VITALS
DIASTOLIC BLOOD PRESSURE: 87 MMHG | HEIGHT: 64 IN | SYSTOLIC BLOOD PRESSURE: 152 MMHG | BODY MASS INDEX: 22.2 KG/M2 | WEIGHT: 130 LBS | HEART RATE: 78 BPM

## 2018-05-08 DIAGNOSIS — S82.851A CLOSED TRIMALLEOLAR FRACTURE OF RIGHT ANKLE, INITIAL ENCOUNTER: Primary | ICD-10-CM

## 2018-05-08 PROCEDURE — 1090F PRES/ABSN URINE INCON ASSESS: CPT | Performed by: ORTHOPAEDIC SURGERY

## 2018-05-08 PROCEDURE — G8420 CALC BMI NORM PARAMETERS: HCPCS | Performed by: ORTHOPAEDIC SURGERY

## 2018-05-08 PROCEDURE — 99214 OFFICE O/P EST MOD 30 MIN: CPT | Performed by: ORTHOPAEDIC SURGERY

## 2018-05-08 PROCEDURE — G8400 PT W/DXA NO RESULTS DOC: HCPCS | Performed by: ORTHOPAEDIC SURGERY

## 2018-05-08 PROCEDURE — 1036F TOBACCO NON-USER: CPT | Performed by: ORTHOPAEDIC SURGERY

## 2018-05-08 PROCEDURE — 1123F ACP DISCUSS/DSCN MKR DOCD: CPT | Performed by: ORTHOPAEDIC SURGERY

## 2018-05-08 PROCEDURE — G8427 DOCREV CUR MEDS BY ELIG CLIN: HCPCS | Performed by: ORTHOPAEDIC SURGERY

## 2018-05-08 PROCEDURE — 4040F PNEUMOC VAC/ADMIN/RCVD: CPT | Performed by: ORTHOPAEDIC SURGERY

## 2018-05-10 ENCOUNTER — HOSPITAL ENCOUNTER (OUTPATIENT)
Dept: PHYSICAL THERAPY | Age: 78
Discharge: HOME | End: 2018-05-11
Admitting: ORTHOPAEDIC SURGERY

## 2018-05-15 ENCOUNTER — TELEPHONE (OUTPATIENT)
Dept: CARDIOLOGY CLINIC | Age: 78
End: 2018-05-15

## 2018-05-16 ENCOUNTER — OFFICE VISIT (OUTPATIENT)
Dept: CARDIOLOGY CLINIC | Age: 78
End: 2018-05-16

## 2018-05-16 VITALS
SYSTOLIC BLOOD PRESSURE: 112 MMHG | HEART RATE: 88 BPM | WEIGHT: 128 LBS | OXYGEN SATURATION: 95 % | DIASTOLIC BLOOD PRESSURE: 68 MMHG | BODY MASS INDEX: 21.97 KG/M2

## 2018-05-16 DIAGNOSIS — E78.2 MIXED HYPERLIPIDEMIA: Primary | Chronic | ICD-10-CM

## 2018-05-16 DIAGNOSIS — D68.51 FACTOR V LEIDEN (HCC): ICD-10-CM

## 2018-05-16 DIAGNOSIS — I10 HTN (HYPERTENSION), BENIGN: ICD-10-CM

## 2018-05-16 PROCEDURE — 4040F PNEUMOC VAC/ADMIN/RCVD: CPT | Performed by: NURSE PRACTITIONER

## 2018-05-16 PROCEDURE — G8420 CALC BMI NORM PARAMETERS: HCPCS | Performed by: NURSE PRACTITIONER

## 2018-05-16 PROCEDURE — 1123F ACP DISCUSS/DSCN MKR DOCD: CPT | Performed by: NURSE PRACTITIONER

## 2018-05-16 PROCEDURE — 1036F TOBACCO NON-USER: CPT | Performed by: NURSE PRACTITIONER

## 2018-05-16 PROCEDURE — 99214 OFFICE O/P EST MOD 30 MIN: CPT | Performed by: NURSE PRACTITIONER

## 2018-05-16 PROCEDURE — G8427 DOCREV CUR MEDS BY ELIG CLIN: HCPCS | Performed by: NURSE PRACTITIONER

## 2018-05-16 PROCEDURE — G8400 PT W/DXA NO RESULTS DOC: HCPCS | Performed by: NURSE PRACTITIONER

## 2018-05-16 PROCEDURE — 1090F PRES/ABSN URINE INCON ASSESS: CPT | Performed by: NURSE PRACTITIONER

## 2018-05-17 ENCOUNTER — HOSPITAL ENCOUNTER (OUTPATIENT)
Dept: PHYSICAL THERAPY | Age: 78
Discharge: HOME | End: 2018-05-18
Admitting: ORTHOPAEDIC SURGERY

## 2018-05-22 ENCOUNTER — HOSPITAL ENCOUNTER (OUTPATIENT)
Dept: PHYSICAL THERAPY | Age: 78
Discharge: HOME | End: 2018-05-23
Admitting: ORTHOPAEDIC SURGERY

## 2018-05-23 ENCOUNTER — OFFICE VISIT (OUTPATIENT)
Dept: ORTHOPEDIC SURGERY | Age: 78
End: 2018-05-23

## 2018-05-23 VITALS — WEIGHT: 128.97 LBS | HEIGHT: 64 IN | BODY MASS INDEX: 22.02 KG/M2

## 2018-05-23 DIAGNOSIS — M25.551 RIGHT HIP PAIN: Primary | ICD-10-CM

## 2018-05-23 PROCEDURE — 1036F TOBACCO NON-USER: CPT | Performed by: ORTHOPAEDIC SURGERY

## 2018-05-23 PROCEDURE — G8427 DOCREV CUR MEDS BY ELIG CLIN: HCPCS | Performed by: ORTHOPAEDIC SURGERY

## 2018-05-23 PROCEDURE — 99214 OFFICE O/P EST MOD 30 MIN: CPT | Performed by: ORTHOPAEDIC SURGERY

## 2018-05-23 PROCEDURE — 1123F ACP DISCUSS/DSCN MKR DOCD: CPT | Performed by: ORTHOPAEDIC SURGERY

## 2018-05-23 PROCEDURE — 1090F PRES/ABSN URINE INCON ASSESS: CPT | Performed by: ORTHOPAEDIC SURGERY

## 2018-05-23 PROCEDURE — 4040F PNEUMOC VAC/ADMIN/RCVD: CPT | Performed by: ORTHOPAEDIC SURGERY

## 2018-05-23 PROCEDURE — G8420 CALC BMI NORM PARAMETERS: HCPCS | Performed by: ORTHOPAEDIC SURGERY

## 2018-05-23 PROCEDURE — G8400 PT W/DXA NO RESULTS DOC: HCPCS | Performed by: ORTHOPAEDIC SURGERY

## 2018-05-23 PROCEDURE — 20610 DRAIN/INJ JOINT/BURSA W/O US: CPT | Performed by: ORTHOPAEDIC SURGERY

## 2018-05-24 ENCOUNTER — HOSPITAL ENCOUNTER (OUTPATIENT)
Dept: PHYSICAL THERAPY | Age: 78
Discharge: HOME | End: 2018-05-25
Admitting: ORTHOPAEDIC SURGERY

## 2018-05-29 ENCOUNTER — HOSPITAL ENCOUNTER (OUTPATIENT)
Dept: PHYSICAL THERAPY | Age: 78
Discharge: HOME | End: 2018-05-30
Admitting: ORTHOPAEDIC SURGERY

## 2018-05-31 ENCOUNTER — HOSPITAL ENCOUNTER (OUTPATIENT)
Dept: PHYSICAL THERAPY | Age: 78
Discharge: HOME | End: 2018-06-30
Admitting: ORTHOPAEDIC SURGERY

## 2018-06-01 ENCOUNTER — HOSPITAL ENCOUNTER (OUTPATIENT)
Dept: OTHER | Age: 78
Discharge: HOME | End: 2018-06-01
Attending: ORTHOPAEDIC SURGERY | Admitting: ORTHOPAEDIC SURGERY

## 2018-06-05 ENCOUNTER — HOSPITAL ENCOUNTER (OUTPATIENT)
Dept: PHYSICAL THERAPY | Age: 78
Discharge: HOME | End: 2018-06-06
Admitting: ORTHOPAEDIC SURGERY

## 2018-06-06 ENCOUNTER — OFFICE VISIT (OUTPATIENT)
Dept: ORTHOPEDIC SURGERY | Age: 78
End: 2018-06-06

## 2018-06-06 VITALS — BODY MASS INDEX: 21.85 KG/M2 | WEIGHT: 128 LBS | HEIGHT: 64 IN

## 2018-06-06 DIAGNOSIS — M25.551 RIGHT HIP PAIN: Primary | ICD-10-CM

## 2018-06-06 PROCEDURE — G8400 PT W/DXA NO RESULTS DOC: HCPCS | Performed by: ORTHOPAEDIC SURGERY

## 2018-06-06 PROCEDURE — 1123F ACP DISCUSS/DSCN MKR DOCD: CPT | Performed by: ORTHOPAEDIC SURGERY

## 2018-06-06 PROCEDURE — 1090F PRES/ABSN URINE INCON ASSESS: CPT | Performed by: ORTHOPAEDIC SURGERY

## 2018-06-06 PROCEDURE — G8420 CALC BMI NORM PARAMETERS: HCPCS | Performed by: ORTHOPAEDIC SURGERY

## 2018-06-06 PROCEDURE — 99214 OFFICE O/P EST MOD 30 MIN: CPT | Performed by: ORTHOPAEDIC SURGERY

## 2018-06-06 PROCEDURE — 1036F TOBACCO NON-USER: CPT | Performed by: ORTHOPAEDIC SURGERY

## 2018-06-06 PROCEDURE — G8427 DOCREV CUR MEDS BY ELIG CLIN: HCPCS | Performed by: ORTHOPAEDIC SURGERY

## 2018-06-06 PROCEDURE — 4040F PNEUMOC VAC/ADMIN/RCVD: CPT | Performed by: ORTHOPAEDIC SURGERY

## 2018-06-12 ENCOUNTER — HOSPITAL ENCOUNTER (OUTPATIENT)
Dept: PHYSICAL THERAPY | Age: 78
Discharge: HOME | End: 2018-06-13
Admitting: ORTHOPAEDIC SURGERY

## 2018-06-14 DIAGNOSIS — S32.10XD CLOSED FRACTURE OF SACRUM WITH ROUTINE HEALING, UNSPECIFIED FRACTURE MORPHOLOGY, SUBSEQUENT ENCOUNTER: Primary | ICD-10-CM

## 2018-06-19 ENCOUNTER — HOSPITAL ENCOUNTER (OUTPATIENT)
Dept: PHYSICAL THERAPY | Age: 78
Discharge: HOME | End: 2018-06-20
Admitting: ORTHOPAEDIC SURGERY

## 2018-06-20 ENCOUNTER — OFFICE VISIT (OUTPATIENT)
Dept: ORTHOPEDIC SURGERY | Age: 78
End: 2018-06-20

## 2018-06-20 VITALS — BODY MASS INDEX: 21.91 KG/M2 | WEIGHT: 128.31 LBS | HEIGHT: 64 IN

## 2018-06-20 DIAGNOSIS — S32.10XD CLOSED FRACTURE OF SACRUM WITH ROUTINE HEALING, UNSPECIFIED FRACTURE MORPHOLOGY, SUBSEQUENT ENCOUNTER: Primary | ICD-10-CM

## 2018-06-20 PROCEDURE — G8400 PT W/DXA NO RESULTS DOC: HCPCS | Performed by: ORTHOPAEDIC SURGERY

## 2018-06-20 PROCEDURE — 1036F TOBACCO NON-USER: CPT | Performed by: ORTHOPAEDIC SURGERY

## 2018-06-20 PROCEDURE — G8420 CALC BMI NORM PARAMETERS: HCPCS | Performed by: ORTHOPAEDIC SURGERY

## 2018-06-20 PROCEDURE — 1090F PRES/ABSN URINE INCON ASSESS: CPT | Performed by: ORTHOPAEDIC SURGERY

## 2018-06-20 PROCEDURE — 99214 OFFICE O/P EST MOD 30 MIN: CPT | Performed by: ORTHOPAEDIC SURGERY

## 2018-06-20 PROCEDURE — 4040F PNEUMOC VAC/ADMIN/RCVD: CPT | Performed by: ORTHOPAEDIC SURGERY

## 2018-06-20 PROCEDURE — 1123F ACP DISCUSS/DSCN MKR DOCD: CPT | Performed by: ORTHOPAEDIC SURGERY

## 2018-06-20 PROCEDURE — G8427 DOCREV CUR MEDS BY ELIG CLIN: HCPCS | Performed by: ORTHOPAEDIC SURGERY

## 2018-07-01 ENCOUNTER — HOSPITAL ENCOUNTER (OUTPATIENT)
Dept: OTHER | Age: 78
Discharge: HOME | End: 2018-07-31
Attending: ORTHOPAEDIC SURGERY | Admitting: ORTHOPAEDIC SURGERY

## 2018-07-03 ENCOUNTER — HOSPITAL ENCOUNTER (OUTPATIENT)
Dept: PHYSICAL THERAPY | Age: 78
Discharge: HOME | End: 2018-07-04
Admitting: ORTHOPAEDIC SURGERY

## 2018-07-06 ENCOUNTER — OFFICE VISIT (OUTPATIENT)
Dept: CARDIOLOGY CLINIC | Age: 78
End: 2018-07-06

## 2018-07-06 VITALS
HEIGHT: 64 IN | SYSTOLIC BLOOD PRESSURE: 110 MMHG | DIASTOLIC BLOOD PRESSURE: 80 MMHG | WEIGHT: 135.44 LBS | HEART RATE: 76 BPM | BODY MASS INDEX: 23.12 KG/M2

## 2018-07-06 DIAGNOSIS — M25.551 RIGHT HIP PAIN: Primary | ICD-10-CM

## 2018-07-06 DIAGNOSIS — I10 HTN (HYPERTENSION), BENIGN: ICD-10-CM

## 2018-07-06 DIAGNOSIS — D68.51 FACTOR V LEIDEN (HCC): ICD-10-CM

## 2018-07-06 DIAGNOSIS — E78.2 MIXED HYPERLIPIDEMIA: Chronic | ICD-10-CM

## 2018-07-06 PROCEDURE — G8420 CALC BMI NORM PARAMETERS: HCPCS | Performed by: INTERNAL MEDICINE

## 2018-07-06 PROCEDURE — 1123F ACP DISCUSS/DSCN MKR DOCD: CPT | Performed by: INTERNAL MEDICINE

## 2018-07-06 PROCEDURE — 1036F TOBACCO NON-USER: CPT | Performed by: INTERNAL MEDICINE

## 2018-07-06 PROCEDURE — G8427 DOCREV CUR MEDS BY ELIG CLIN: HCPCS | Performed by: INTERNAL MEDICINE

## 2018-07-06 PROCEDURE — G8400 PT W/DXA NO RESULTS DOC: HCPCS | Performed by: INTERNAL MEDICINE

## 2018-07-06 PROCEDURE — 1090F PRES/ABSN URINE INCON ASSESS: CPT | Performed by: INTERNAL MEDICINE

## 2018-07-06 PROCEDURE — 4040F PNEUMOC VAC/ADMIN/RCVD: CPT | Performed by: INTERNAL MEDICINE

## 2018-07-06 PROCEDURE — 99214 OFFICE O/P EST MOD 30 MIN: CPT | Performed by: INTERNAL MEDICINE

## 2018-07-06 NOTE — LETTER
[address] Cardiology [address] [address]  Phone: [phone]  Fax: [phone]    Beulah Anderson MD        July 9, 2018     JACKI NOVOA [address] [facility]  [address] [place] [address]. Jessy [place] [address]    Patient: Sophy Casper  MR Number: [id number]  YOB: 1940  Date of Visit: 7/6/2018    Dear Dr. Gena Delgado:     Below are the relevant portions of my assessment and plan of care. Cardiac Follow Up    Referring Provider:  Gena Delgado     Chief Complaint   Patient presents with    Hyperlipidemia    Hypertension      Factor V Leiden     Had broken right ankle in Jan. 2018, Feels much better. June 14, 2018 had cognitive testing. History of Present Illness:  Bianca Branham is a 76 y.o. female here in follow up. She has seen my partner, Dr. Sid Wells, in the past. As you recall, she was seen as a new patient at the request of Dr. Narciso Cobos for concerns of ASD noted in 1996. 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 Esther Martínez in hematology. She also has a history of TIA, and hyperlipidemia. In 1996, she had an episode of blurry eye sight with hand numbness that was diagnosed as a complicated migraine. She had an echo with bubble study April 4, 2014 - LV normal with LVEF 62-24%, diastolic dysfunction, trace MR, and moderate TR. Bianca Branham was admitted to [facility] 10/30/14 with slurred speech, difficulty finding words, and transient headaches. She was transferred to [facility] and evaluated by oncology, neurology, and cardiology. She thinks the cause for symptoms was likely because of sub therapeutic INR and TIA. Clark Abbasi She had an echo with bubble study 1/18/3016 that did not document an ASD. In 2015 she fell and fractured her hip and had surgery. Bianca Branham reports having TIA's from time to time.  In June 2016, she was on the phone with her son who lives in [facility]., and she couldn't put   Calcium Carb-Cholecalciferol (CALCIUM-VITAMIN D) 500-200 MG-UNIT per tablet, Take 1 tablet by mouth, Disp: , Rfl:     XARELTO 20 MG TABS tablet, TAKE 1 TABLET EVERY DAY, Disp: 90 tablet, Rfl: 3    traZODone (DESYREL) 50 MG tablet, Take 100 mg by mouth nightly , Disp: , Rfl:     Multiple Vitamins-Minerals (THERAPEUTIC MULTIVITAMIN-MINERALS) tablet, Take 1 tablet by mouth daily, Disp: , Rfl:     alendronate (FOSAMAX) 70 MG tablet, Take 70 mg by mouth every 7 days Takes on Saturday, Disp: , Rfl:     Omega-3 Fatty Acids (FISH OIL) 1200 MG CAPS, Take 1 tablet by mouth daily. , Disp: , Rfl:     pravastatin (PRAVACHOL) 80 MG tablet, Take 80 mg by mouth daily. , Disp: , Rfl:     Coenzyme Q10-Levocarnitine (CO Q-10 PLUS PO), Take 100 mg by mouth daily , Disp: , Rfl:     vitamin D (CHOLECALCIFEROL) 400 UNIT TABS tablet, Take 5,000 Units by mouth daily. , Disp: , Rfl:     mirtazapine (REMERON) 15 MG tablet, Take 7.5 mg by mouth, Disp: , Rfl:         Allergies:  Flagyl [metronidazole]; Omnicef; Cephalexin; and Penicillins     Review of Systems:   · Constitutional: there has been no unanticipated weight loss. There's been no change in energy level, sleep pattern, or activity level. · Eyes: No visual changes or diplopia. No scleral icterus. · ENT: No Headaches, hearing loss or vertigo. No mouth sores or sore throat. · Cardiovascular: Reviewed in HPI  · Respiratory: No cough or wheezing, no sputum production. No hematemesis. · Gastrointestinal: No abdominal pain, appetite loss, blood in stools. No change in bowel or bladder habits. · Genitourinary: No dysuria, trouble voiding, or hematuria. · Musculoskeletal:  No gait disturbance, weakness or joint complaints. · Integumentary: No rash or pruritis. · Neurological: No headache, diplopia, change in muscle strength, numbness or tingling. No change in gait, balance, coordination, mood, affect, memory, mentation, behavior. · Psychiatric: No anxiety, no depression. · Endocrine: No malaise, fatigue or temperature intolerance. No excessive thirst, fluid intake, or urination. No tremor. · Hematologic/Lymphatic: No abnormal bruising or bleeding, blood clots or swollen lymph nodes. · Allergic/Immunologic: No nasal congestion or hives. Physical Examination:    Vitals:    07/06/18 1353   BP: 110/80   Pulse: 76        Constitutional and General Appearance: NAD, pleasant  Skin:good turgor,intact without lesions  HEENT: EOMI ,normal  Neck:no JVD    Respiratory:  · Normal excursion and expansion without use of accessory muscles  · Resp Auscultation: Normal breath sounds without dullness  Cardiovascular:  · The apical impulses not displaced  · Heart tones are crisp and normal  · Cervical veins are not engorged  · The carotid upstroke is normal in amplitude and contour without delay or bruit  · Normal S1S2, No S3, no murmur  · Peripheral pulses are symmetrical and full  · There is no clubbing, cyanosis of the extremities. · No edema. · Femoral Arteries: 2+ and equal  · Pedal Pulses: 2+ and equal   Abdomen:  · No masses or tenderness  · Liver/Spleen: No Abnormalities Noted  Neurological/Psychiatric:  · Alert and oriented in all spheres  · Moves all extremities well  · Exhibits normal gait balance and coordination  · No abnormalities of mood, affect, memory, mentation, or behavior are noted    ARIN 12/27/16  Summary   Mild mitral regurgitation is present.   A bubble study was performed and showed no evidence of shunting. No evidence   to support presence of a PFO    1/2016 Echo Bubble Study---  Mild concentric left ventricular hypertrophy is present. Overall left ventricular function is normal.  Ejection fraction is visually estimated to be 55-60%. There is reversal of  E/A inflow velocities across the mitral valve suggesting impaired left ventricular relaxation. Severe calcification of the posterior mitral valve annulus. A bubble study was performed and fails to show evidence of shunting. Right heart size is borderline enlarged with normal RV function. Assessment:   1.  - Echo 10/2014: EF 55-60% no evidence for shunting, RVSP 53 mmHg. Bubble study Echo 4/2014 does not document ASD.  1/2016 bubble study echo does not document ASD. ARIN 12/27/16 showed no shunting to support evidence of PFO. 2. Hyperlipidemia: /80 (Site: Left Arm, Position: Sitting, Cuff Size: Medium Adult)   Pulse 76   Ht 5' 3.5\" (1.613 m)   Wt 135 lb 7 oz (61.4 kg)   BMI 23.62 kg/m²   stable   3. occlusion and stenosis of carotid arteries--TIA (transient ischemic attack): Event in 2014,  recurrence in June 2016. No recurrence since June. On Xarelto 20 mg.   6/17    Right 1-15%  Left 16-49%     4. Factor V Leiden: Hx of DVT while on hormone therapy, on coumadin. On Xarelto 20 mg now. Dr. Margarita Ramos follows. Plan:Bibiana is stable from a cardiac standpoint. Will discuss with  about referral to Tsering Mendoza , endocrinology  Continue all other medications  F/U in 6 months    Thank you for allowing me to participate in the care of this individual.    Jonah Yarbrough MD, [facility]    If you have questions, please do not hesitate to call me. I look forward to following Talat Mathew along with you.     Sincerely,        Thomas Grimm MD

## 2018-07-06 NOTE — PROGRESS NOTES
fracture of the sacrum, and two bouts of c-dif. They have seen  at [facility] and discussed the memory issues. She is tolerating Xarelto for Factor V Leiden without bleeding or excessive bruising. She denies exertional chest pain, SOB/CHOUDHURY, PND, light-headedness, or edema. Her son is here for visit today. States her memory has been improving . Past Medical History:   has a past medical history of Allergic; Anxiety; Arthritis; C. difficile colitis; Depression; DVT (deep venous thrombosis) ([facility] [address] H/O migraine; Hx of blood clots; Hyperlipidemia; Movement disorder; New onset seizure ([facility] [address] Unspecified cerebral artery occlusion with cerebral infarction; Unspecified diseases of blood and blood-forming organs; and Vitamin D deficiency. Surgical History:   has a past surgical history that includes Carpal tunnel release; Knee arthroscopy (Bilateral); Colonoscopy; Tonsillectomy; Cosmetic surgery; other surgical history (Right); hip surgery (Right, 4/21/15.); and fracture surgery (01/08/2018). Social History:   reports that she has never smoked. She has never used smokeless tobacco. She reports that she does not drink alcohol or use drugs. Family History:  family history includes Arthritis in her mother; Asthma in her father; Depression in her mother; Heart Disease in her brother, father, and sister; Stroke in her mother.      Home medications:    Current Outpatient Prescriptions:     Probiotic Product (PROBIOTIC-10) CAPS, Take by mouth daily, Disp: , Rfl:     Calcium Carb-Cholecalciferol (CALCIUM-VITAMIN D) 500-200 MG-UNIT per tablet, Take 1 tablet by mouth, Disp: , Rfl:     XARELTO 20 MG TABS tablet, TAKE 1 TABLET EVERY DAY, Disp: 90 tablet, Rfl: 3    traZODone (DESYREL) 50 MG tablet, Take 100 mg by mouth nightly , Disp: , Rfl:     Multiple Vitamins-Minerals (THERAPEUTIC MULTIVITAMIN-MINERALS) tablet, Take 1 tablet by mouth daily, Disp: , Rfl:     alendronate (FOSAMAX) 70 MG tablet, Take 70 mg by mouth every 7 days Takes on Saturday, Disp: , Rfl:     Omega-3 Fatty Acids (FISH OIL) 1200 MG CAPS, Take 1 tablet by mouth daily. , Disp: , Rfl:     pravastatin (PRAVACHOL) 80 MG tablet, Take 80 mg by mouth daily. , Disp: , Rfl:     Coenzyme Q10-Levocarnitine (CO Q-10 PLUS PO), Take 100 mg by mouth daily , Disp: , Rfl:     vitamin D (CHOLECALCIFEROL) 400 UNIT TABS tablet, Take 5,000 Units by mouth daily. , Disp: , Rfl:     mirtazapine (REMERON) 15 MG tablet, Take 7.5 mg by mouth, Disp: , Rfl:         Allergies:  Flagyl [metronidazole]; Omnicef; Cephalexin; and Penicillins     Review of Systems:   · Constitutional: there has been no unanticipated weight loss. There's been no change in energy level, sleep pattern, or activity level. · Eyes: No visual changes or diplopia. No scleral icterus. · ENT: No Headaches, hearing loss or vertigo. No mouth sores or sore throat. · Cardiovascular: Reviewed in HPI  · Respiratory: No cough or wheezing, no sputum production. No hematemesis. · Gastrointestinal: No abdominal pain, appetite loss, blood in stools. No change in bowel or bladder habits. · Genitourinary: No dysuria, trouble voiding, or hematuria. · Musculoskeletal:  No gait disturbance, weakness or joint complaints. · Integumentary: No rash or pruritis. · Neurological: No headache, diplopia, change in muscle strength, numbness or tingling. No change in gait, balance, coordination, mood, affect, memory, mentation, behavior. · Psychiatric: No anxiety, no depression. · Endocrine: No malaise, fatigue or temperature intolerance. No excessive thirst, fluid intake, or urination. No tremor. · Hematologic/Lymphatic: No abnormal bruising or bleeding, blood clots or swollen lymph nodes. · Allergic/Immunologic: No nasal congestion or hives.     Physical Examination:    Vitals:    07/06/18 1353   BP: 110/80   Pulse: 76        Constitutional and General Appearance: NAD, pleasant  Skin:good turgor,intact without lesions  HEENT: EOMI ,normal  Neck:no JVD    Respiratory:  · Normal excursion and expansion without use of accessory muscles  · Resp Auscultation: Normal breath sounds without dullness  Cardiovascular:  · The apical impulses not displaced  · Heart tones are crisp and normal  · Cervical veins are not engorged  · The carotid upstroke is normal in amplitude and contour without delay or bruit  · Normal S1S2, No S3, no murmur  · Peripheral pulses are symmetrical and full  · There is no clubbing, cyanosis of the extremities. · No edema. · Femoral Arteries: 2+ and equal  · Pedal Pulses: 2+ and equal   Abdomen:  · No masses or tenderness  · Liver/Spleen: No Abnormalities Noted  Neurological/Psychiatric:  · Alert and oriented in all spheres  · Moves all extremities well  · Exhibits normal gait balance and coordination  · No abnormalities of mood, affect, memory, mentation, or behavior are noted    ARIN 12/27/16  Summary   Mild mitral regurgitation is present.   A bubble study was performed and showed no evidence of shunting. No evidence   to support presence of a PFO    1/2016 Echo Bubble Study---  Mild concentric left ventricular hypertrophy is present. Overall left ventricular function is normal.  Ejection fraction is visually estimated to be 55-60%. There is reversal of  E/A inflow velocities across the mitral valve suggesting impaired left ventricular relaxation. Severe calcification of the posterior mitral valve annulus. A bubble study was performed and fails to show evidence of shunting. Right heart size is borderline enlarged with normal RV function. Assessment:   1.  - Echo 10/2014: EF 55-60% no evidence for shunting, RVSP 53 mmHg. Bubble study Echo 4/2014 does not document ASD.  1/2016 bubble study echo does not document ASD. ARIN 12/27/16 showed no shunting to support evidence of PFO.    2. Hyperlipidemia: /80 (Site: Left Arm, Position: Sitting, Cuff Size: Medium Adult)   Pulse 76   Ht 5' 3.5\" (1.613 m)   Wt 135 lb

## 2018-07-06 NOTE — PATIENT INSTRUCTIONS
Riley Bermudez MD- endocrinologist- evaluate osteoporosis  [address] [facility] [place] [address] [facility] [place] [address]  [phone]

## 2018-07-09 NOTE — COMMUNICATION BODY
Cardiac Follow Up    Referring Provider:  Kaley Jerome     Chief Complaint   Patient presents with    Hyperlipidemia    Hypertension      Factor V Leiden     Had broken right ankle in Jan. 2018, Feels much better. June 14, 2018 had cognitive testing. History of Present Illness:  Travis Jones is a 76 y.o. female here in follow up. She has seen my partner, Dr. John James, in the past. As you recall, she was seen as a new patient at the request of Dr. Gemma Miller for concerns of ASD noted in 1996. 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 George Teague in hematology. She also has a history of TIA, and hyperlipidemia. In 1996, she had an episode of blurry eye sight with hand numbness that was diagnosed as a complicated migraine. She had an echo with bubble study April 4, 2014 - LV normal with LVEF 31-09%, diastolic dysfunction, trace MR, and moderate TR. Travis Jones was admitted to [facility] 10/30/14 with slurred speech, difficulty finding words, and transient headaches. She was transferred to [facility] and evaluated by oncology, neurology, and cardiology. She thinks the cause for symptoms was likely because of sub therapeutic INR and TIA. Joselo Gil She had an echo with bubble study 1/18/3016 that did not document an ASD. In 2015 she fell and fractured her hip and had surgery. Travis Jones reports having TIA's from time to time. In June 2016, she was on the phone with her son who lives in [facility]., and she couldn't put two words together. She ended up being [facility] days with a TIA. She was on coumadin at the time with therapeutic INR's. Her neurologist is Dr. Hugo Rojo. ARIN done in 12/27/16 showed no evidence of shunting, mild MR. At our last visit in March, we switched her Coumadin to Xarelto. She has also since been taken off of asa. Today, Travis Jones feels well.  She developed right hip pain and after evaluation was found to have a every 7 days Takes on Saturday, Disp: , Rfl:     Omega-3 Fatty Acids (FISH OIL) 1200 MG CAPS, Take 1 tablet by mouth daily. , Disp: , Rfl:     pravastatin (PRAVACHOL) 80 MG tablet, Take 80 mg by mouth daily. , Disp: , Rfl:     Coenzyme Q10-Levocarnitine (CO Q-10 PLUS PO), Take 100 mg by mouth daily , Disp: , Rfl:     vitamin D (CHOLECALCIFEROL) 400 UNIT TABS tablet, Take 5,000 Units by mouth daily. , Disp: , Rfl:     mirtazapine (REMERON) 15 MG tablet, Take 7.5 mg by mouth, Disp: , Rfl:         Allergies:  Flagyl [metronidazole]; Omnicef; Cephalexin; and Penicillins     Review of Systems:   · Constitutional: there has been no unanticipated weight loss. There's been no change in energy level, sleep pattern, or activity level. · Eyes: No visual changes or diplopia. No scleral icterus. · ENT: No Headaches, hearing loss or vertigo. No mouth sores or sore throat. · Cardiovascular: Reviewed in HPI  · Respiratory: No cough or wheezing, no sputum production. No hematemesis. · Gastrointestinal: No abdominal pain, appetite loss, blood in stools. No change in bowel or bladder habits. · Genitourinary: No dysuria, trouble voiding, or hematuria. · Musculoskeletal:  No gait disturbance, weakness or joint complaints. · Integumentary: No rash or pruritis. · Neurological: No headache, diplopia, change in muscle strength, numbness or tingling. No change in gait, balance, coordination, mood, affect, memory, mentation, behavior. · Psychiatric: No anxiety, no depression. · Endocrine: No malaise, fatigue or temperature intolerance. No excessive thirst, fluid intake, or urination. No tremor. · Hematologic/Lymphatic: No abnormal bruising or bleeding, blood clots or swollen lymph nodes. · Allergic/Immunologic: No nasal congestion or hives.     Physical Examination:    Vitals:    07/06/18 1353   BP: 110/80   Pulse: 76        Constitutional and General Appearance: NAD, pleasant  Skin:good turgor,intact without lesions  HEENT: 7 oz (61.4 kg)   BMI 23.62 kg/m²   stable   3. occlusion and stenosis of carotid arteries--TIA (transient ischemic attack): Event in 2014,  recurrence in June 2016. No recurrence since June. On Xarelto 20 mg.   6/17    Right 1-15%  Left 16-49%     4. Factor V Leiden: Hx of DVT while on hormone therapy, on coumadin. On Xarelto 20 mg now. Dr. Caren Seals follows. Plan:Bibiana is stable from a cardiac standpoint.    Will discuss with  about referral to Wisam Mcintosh , endocrinology  Continue all other medications  F/U in 6 months    Thank you for allowing me to participate in the care of this individual.    Rox Morrow MD, [facility] [place]

## 2018-07-12 ENCOUNTER — HOSPITAL ENCOUNTER (OUTPATIENT)
Dept: PHYSICAL THERAPY | Age: 78
Discharge: HOME | End: 2018-07-13
Admitting: ORTHOPAEDIC SURGERY

## 2018-07-12 NOTE — FLOWSHEET NOTE
[address] [place] [address]  Phone: [phone] Fax: [phone]      . Physical Therapy Daily Treatment Note  Date:  2018    Patient Name:  Himanshu Ventura    :  1940  MRN: [id number]  Restrictions/Precautions:    Medical/Treatment Diagnosis Information:  · Diagnosis: S82.851A (ICD-10-CM) - Closed trimalleolar fracture of right ankle, initial encounter  · Treatment Diagnosis: S82.851A (ICD-10-CM) - Closed trimalleolar fracture of right ankle, initial encounter  Insurance/Certification information:     Physician Information:  Referring Practitioner: Dr Cruz Neighbor of care signed (Y/N):     Date of Patient follow up with Physician:     G-Code (if applicable):      Date G-Code Applied:    PT G-Codes  Functional Assessment Tool Used: LEFS    Progress Note: [x]  Yes  []  No  Next due by: Visit #10       Latex Allergy:  [x]NO      []YES  Preferred Language for Healthcare:   [x]English       []other:    Visit # Insurance Allowable   7 12     Pain level:  0-7/10     SUBJECTIVE:   Patient decreased pain overall, walking with less pain. 5-6/10. Int pain. Continues to progress walking distance     OBJECTIVE:   Observation: Antalgic gait R LE  Test measurements:   ROM doing well. Scar very immobile. No hip pain with sitting, pain with push off R LE  No increased pain with lumbar ROM, slight decrease in symptoms with FB.   Hip flexion and ER painful       RESTRICTIONS/PRECAUTIONS: L ankle ORIF 18    Exercises/Interventions:     Therapeutic Ex Sets/sec Reps Notes   BKFO 1 15    Hip abd iso 10sec 15    SKC 10 sec 15                      PPT with manual feedback    R hip abd isometric    Supine hip ROM     SKC    Eversion BAPS BOSU lunge Heel raise SLS wall Gastroc st Manual Intervention      L Sidely R  pelvic post rot 0 min     Hip flex, ER, add st PROM 15 min     R sidely grade II/III pelvic PNF patterning 0 min Ankle PNF 0 min  peroneal   Prone PA 0 min  Sacrum, L 5   Ankle mobs/scar mob 0 min  Myofascial/STJ dist   Reassessment x10 min      DTM to posterior glute 12 min     NMR re-education      Bangladeshi/Biofeedback 10/10      G. Med activation/sidelying      G. Max Activation/prone      Hip Ext full ROM G. Activation      Bosu Bal and Prop- G Med      Single leg stance/Balance/Prop      Bosu Retro G. Med act                      Therapeutic Exercise and NMR EXR  [x] (70471) Provided verbal/tactile cueing for activities related to strengthening, flexibility, endurance, ROM for improvements in LE, proximal hip, and core control with self care, mobility, lifting, ambulation.  [] (04212) Provided verbal/tactile cueing for activities related to improving balance, coordination, kinesthetic sense, posture, motor skill, proprioception  to assist with LE, proximal hip, and core control in self care, mobility, lifting, ambulation and eccentric single leg control.      NMR and Therapeutic Activities:    [] (65928 or 40762) Provided verbal/tactile cueing for activities related to improving balance, coordination, kinesthetic sense, posture, motor skill, proprioception and motor activation to allow for proper function of core, proximal hip and LE with self care and ADLs  [] (46793) Gait Re-education- Provided training and instruction to the patient for proper LE, core and proximal hip recruitment and positioning and eccentric body weight control with ambulation re-education including up and down stairs     Home Exercise Program:    [x] (29469) Reviewed/Progressed HEP activities related to strengthening, flexibility, endurance, ROM of core, proximal hip and LE for functional self-care, mobility, lifting and ambulation/stair navigation   [] (26724)Reviewed/Progressed HEP activities related to improving balance, coordination, kinesthetic sense, posture, motor skill, proprioception of core, proximal hip and LE for self care, mobility,

## 2018-07-24 ENCOUNTER — HOSPITAL ENCOUNTER (OUTPATIENT)
Dept: PHYSICAL THERAPY | Age: 78
Discharge: HOME | End: 2018-07-25
Admitting: ORTHOPAEDIC SURGERY

## 2018-07-24 ENCOUNTER — TELEPHONE (OUTPATIENT)
Dept: CARDIOLOGY CLINIC | Age: 78
End: 2018-07-24

## 2018-07-24 NOTE — FLOWSHEET NOTE
Gastroc st Manual Intervention      L Sidely R  pelvic post rot 0 min     Hip flex, ER, add st PROM 15 min     R sidely grade II/III pelvic PNF patterning 0 min     Ankle PNF 0 min  peroneal   Prone PA 0 min  Sacrum, L 5   Ankle mobs/scar mob 0 min  Myofascial/STJ dist   Reassessment x10 min      DTM to posterior glute 12 min     NMR re-education      Barbadian/Biofeedback 10/10      G. Med activation/sidelying      G. Max Activation/prone      Hip Ext full ROM G. Activation      Bosu Bal and Prop- G Med      Single leg stance/Balance/Prop      Bosu Retro G. Med act                      Therapeutic Exercise and NMR EXR  [x] (19806) Provided verbal/tactile cueing for activities related to strengthening, flexibility, endurance, ROM for improvements in LE, proximal hip, and core control with self care, mobility, lifting, ambulation.  [] (09281) Provided verbal/tactile cueing for activities related to improving balance, coordination, kinesthetic sense, posture, motor skill, proprioception  to assist with LE, proximal hip, and core control in self care, mobility, lifting, ambulation and eccentric single leg control.      NMR and Therapeutic Activities:    [] (05985 or 66560) Provided verbal/tactile cueing for activities related to improving balance, coordination, kinesthetic sense, posture, motor skill, proprioception and motor activation to allow for proper function of core, proximal hip and LE with self care and ADLs  [] (19865) Gait Re-education- Provided training and instruction to the patient for proper LE, core and proximal hip recruitment and positioning and eccentric body weight control with ambulation re-education including up and down stairs     Home Exercise Program:    [x] (88707) Reviewed/Progressed HEP activities related to strengthening, flexibility, endurance, ROM of core, proximal hip and LE for functional self-care, mobility, lifting and ambulation/stair navigation   [] (70006)Reviewed/Progressed HEP

## 2018-07-25 ENCOUNTER — TELEPHONE (OUTPATIENT)
Dept: CARDIOLOGY CLINIC | Age: 78
End: 2018-07-25

## 2018-07-25 ENCOUNTER — TELEPHONE (OUTPATIENT)
Dept: NEUROLOGY | Age: 78
End: 2018-07-25

## 2018-07-27 ENCOUNTER — TELEPHONE (OUTPATIENT)
Dept: CARDIOLOGY CLINIC | Age: 78
End: 2018-07-27

## 2018-07-31 ENCOUNTER — TELEPHONE (OUTPATIENT)
Age: 78
End: 2018-07-31

## 2018-08-01 ENCOUNTER — HOSPITAL ENCOUNTER (OUTPATIENT)
Dept: OTHER | Age: 78
Discharge: HOME | End: 2018-08-31
Attending: ORTHOPAEDIC SURGERY | Admitting: ORTHOPAEDIC SURGERY

## 2018-08-01 ENCOUNTER — OFFICE VISIT (OUTPATIENT)
Dept: ORTHOPEDIC SURGERY | Age: 78
End: 2018-08-01

## 2018-08-01 VITALS — HEIGHT: 63 IN | WEIGHT: 134 LBS | BODY MASS INDEX: 23.74 KG/M2

## 2018-08-01 DIAGNOSIS — S32.10XD CLOSED FRACTURE OF SACRUM WITH ROUTINE HEALING, UNSPECIFIED FRACTURE MORPHOLOGY, SUBSEQUENT ENCOUNTER: Primary | ICD-10-CM

## 2018-08-01 PROCEDURE — 1123F ACP DISCUSS/DSCN MKR DOCD: CPT | Performed by: ORTHOPAEDIC SURGERY

## 2018-08-01 PROCEDURE — 4040F PNEUMOC VAC/ADMIN/RCVD: CPT | Performed by: ORTHOPAEDIC SURGERY

## 2018-08-01 PROCEDURE — 1090F PRES/ABSN URINE INCON ASSESS: CPT | Performed by: ORTHOPAEDIC SURGERY

## 2018-08-01 PROCEDURE — G8400 PT W/DXA NO RESULTS DOC: HCPCS | Performed by: ORTHOPAEDIC SURGERY

## 2018-08-01 PROCEDURE — 99214 OFFICE O/P EST MOD 30 MIN: CPT | Performed by: ORTHOPAEDIC SURGERY

## 2018-08-01 PROCEDURE — 1101F PT FALLS ASSESS-DOCD LE1/YR: CPT | Performed by: ORTHOPAEDIC SURGERY

## 2018-08-01 PROCEDURE — 1036F TOBACCO NON-USER: CPT | Performed by: ORTHOPAEDIC SURGERY

## 2018-08-01 PROCEDURE — G8427 DOCREV CUR MEDS BY ELIG CLIN: HCPCS | Performed by: ORTHOPAEDIC SURGERY

## 2018-08-01 PROCEDURE — G8420 CALC BMI NORM PARAMETERS: HCPCS | Performed by: ORTHOPAEDIC SURGERY

## 2018-08-01 NOTE — PROGRESS NOTES
History of Present Illness:  Priya Mendoza is a 76 y.o. female check evaluation of sacral insufficiency fracture    Location right sacral area  Severity improving  Duration just over 2 months  Associated sign/symptoms she still gets some gluteal pain and lateral thigh pain    I have reviewed and discussed the below Pain assessment findings with the patient. Medical History  Patient's medications, allergies, past medical, surgical, social and family histories were reviewed and updated as appropriate. Past Medical History:   Diagnosis Date    Allergic     Anxiety     Arthritis     C. difficile colitis 02/08/2018; 3/23/2018    Depression     aniexty.  DVT (deep venous thrombosis) ([facility]) age 59    LLE whie on hormone therapy    H/O migraine     Hx of blood clots     Hyperlipidemia     Movement disorder     osteoporosis    Unspecified cerebral artery occlusion with cerebral infarction     tia---march 2009, 10/30/2014    Unspecified diseases of blood and blood-forming organs     Factyor V Leiden    Vitamin D deficiency      Family History   Problem Relation Age of Onset    Stroke Mother     Arthritis Mother     Depression Mother     Heart Disease Father     Asthma Father     Heart Disease Sister     Heart Disease Brother      Social History     Social History    Marital status:       Spouse name: N/A    Number of children: 2    Years of education: N/A     Social History Main Topics    Smoking status: Never Smoker    Smokeless tobacco: Never Used    Alcohol use No    Drug use: No    Sexual activity: Not Currently     Other Topics Concern    None     Social History Narrative    None     Current Outpatient Prescriptions   Medication Sig Dispense Refill    rivaroxaban (XARELTO) 20 MG TABS tablet Take 1 tablet by mouth daily (with breakfast) 90 tablet 3    Probiotic Product (PROBIOTIC-10) CAPS Take by mouth daily      mirtazapine (REMERON) 15 MG tablet Take 7.5 mg by mouth

## 2018-08-02 ENCOUNTER — HOSPITAL ENCOUNTER (OUTPATIENT)
Dept: PHYSICAL THERAPY | Age: 78
Discharge: HOME | End: 2018-08-03
Admitting: ORTHOPAEDIC SURGERY

## 2018-08-02 NOTE — FLOWSHEET NOTE
[address] [place], [place] [address]  Phone: [phone] Fax: [phone]      . Physical Therapy Daily Treatment Note  Date:  2018    Patient Name:  Himanshu Ventura    :  1940  MRN: [id number]  Restrictions/Precautions:    Medical/Treatment Diagnosis Information:  · Diagnosis: S82.851A (ICD-10-CM) - Closed trimalleolar fracture of right ankle, initial encounter  · Treatment Diagnosis: S82.851A (ICD-10-CM) - Closed trimalleolar fracture of right ankle, initial encounter  Insurance/Certification information:     Physician Information:  Referring Practitioner: Dr Cruz Neighbor of care signed (Y/N):     Date of Patient follow up with Physician:     G-Code (if applicable):      Date G-Code Applied:    PT G-Codes  Functional Assessment Tool Used: LEFS    Progress Note: [x]  Yes  []  No  Next due by: Visit #10       Latex Allergy:  [x]NO      []YES  Preferred Language for Healthcare:   [x]English       []other:    Visit # Insurance Allowable   9 12     Pain level:  0-7/10     SUBJECTIVE:   Patient reports pain in R buttock has been improved. She reports most pain is in the am for approx 20 min, then it does well the rest of the day. She reports not using cane other than the am.    OBJECTIVE:   Observation: Antalgic gait R LE  Test measurements:   ROM doing well. Scar very immobile. No hip pain with sitting, pain with push off R LE  No increased pain with lumbar ROM, slight decrease in symptoms with FB.   Hip flexion and ER painful       RESTRICTIONS/PRECAUTIONS: L ankle ORIF 18    Exercises/Interventions:     Therapeutic Ex Sets/sec Reps Notes   BKFO 1 15    Hip abd/add iso 10sec 15    SKC 10 sec 15    Hip abd standing 2 10                PPT with manual feedback    R hip abd isometric    Supine hip ROM     SKC    Eversion BAPS BOSU lunge Heel raise SLS wall Gastroc st Manual Intervention      L Sidely R  pelvic post rot 0 min     Hip flex, ER, add st PROM 15 min     R sidely grade II/III pelvic PNF patterning 0 min     Ankle PNF 0 min  peroneal   Prone PA 0 min  Sacrum, L 5   Ankle mobs/scar mob 0 min  Myofascial/STJ dist   Reassessment x10 min      DTM to posterior glute 12 min     NMR re-education      Kazakh/Biofeedback 10/10      G. Med activation/sidelying      G. Max Activation/prone      Hip Ext full ROM G. Activation      Bosu Bal and Prop- G Med      Single leg stance/Balance/Prop      Bosu Retro G. Med act                      Therapeutic Exercise and NMR EXR  [x] (28301) Provided verbal/tactile cueing for activities related to strengthening, flexibility, endurance, ROM for improvements in LE, proximal hip, and core control with self care, mobility, lifting, ambulation.  [] (82459) Provided verbal/tactile cueing for activities related to improving balance, coordination, kinesthetic sense, posture, motor skill, proprioception  to assist with LE, proximal hip, and core control in self care, mobility, lifting, ambulation and eccentric single leg control.      NMR and Therapeutic Activities:    [] (66218 or 15951) Provided verbal/tactile cueing for activities related to improving balance, coordination, kinesthetic sense, posture, motor skill, proprioception and motor activation to allow for proper function of core, proximal hip and LE with self care and ADLs  [] (72238) Gait Re-education- Provided training and instruction to the patient for proper LE, core and proximal hip recruitment and positioning and eccentric body weight control with ambulation re-education including up and down stairs     Home Exercise Program:    [x] (65249) Reviewed/Progressed HEP activities related to strengthening, flexibility, endurance, ROM of core, proximal hip and LE for functional self-care, mobility, lifting and ambulation/stair navigation   [] (98875)Reviewed/Progressed HEP activities related to improving balance, coordination, kinesthetic sense, posture, motor skill, proprioception of core, proximal hip and LE for self care, mobility, lifting, and ambulation/stair navigation      Manual Treatments:  PROM / STM / Oscillations-Mobs:  G-I, II, III, IV (PA's, Inf., Post.)  [x] (49797) Provided manual therapy to mobilize LE, proximal hip and/or LS spine soft tissue/joints for the purpose of modulating pain, promoting relaxation,  increasing ROM, reducing/eliminating soft tissue swelling/inflammation/restriction, improving soft tissue extensibility and allowing for proper ROM for normal function with self care, mobility, lifting and ambulation. Modalities:      Charges:  Timed Code Treatment Minutes: 40   Total Treatment Minutes: 40     [] EVAL (LOW) 09019 (typically 20 minutes face-to-face)  [] EVAL (MOD) 71254 (typically 30 minutes face-to-face)  [] EVAL (HIGH) 01916 (typically 45 minutes face-to-face)  [] RE-EVAL     [x] TS(55287) x  1   [] IONTO  [] NMR (85225) x      [] VASO  [x] Manual (49314) x  2    [] Other:  [] TA x       [] Mech Traction (59039)  [] ES(attended) (21114)      [] ES (un) (52595):     GOALS:     Therapist goals for Patient:   Short Term Goals: To be achieved in: 2 weeks  1. Independent in HEP and progression per patient tolerance, in order to prevent re-injury. 2. Patient will have a decrease in pain to facilitate improvement in movement, function, and ADLs as indicated by Functional Deficits. Long Term Goals: To be achieved in: 4 weeks  1. Disability index score of 20% or less for the LEFS to assist with reaching prior level of function. 2. Patient will demonstrate increased AROM to [facility] to allow for proper joint functioning as indicated by patients Functional Deficits. 3. Patient will demonstrate an increase in Strength to good proximal hip strength and control, within 5lb HHD in LE to allow for proper functional mobility as indicated by patients Functional Deficits.    4. Patient will return to walking functional

## 2018-08-07 ENCOUNTER — OFFICE VISIT (OUTPATIENT)
Dept: ORTHOPEDIC SURGERY | Age: 78
End: 2018-08-07

## 2018-08-07 VITALS — WEIGHT: 130 LBS | HEIGHT: 64 IN | BODY MASS INDEX: 22.2 KG/M2

## 2018-08-07 DIAGNOSIS — S82.851A CLOSED TRIMALLEOLAR FRACTURE OF RIGHT ANKLE, INITIAL ENCOUNTER: ICD-10-CM

## 2018-08-07 DIAGNOSIS — S82.851D CLOSED DISPLACED TRIMALLEOLAR FRACTURE OF RIGHT ANKLE WITH ROUTINE HEALING: ICD-10-CM

## 2018-08-07 DIAGNOSIS — M25.551 HIP PAIN, RIGHT: Primary | ICD-10-CM

## 2018-08-07 PROCEDURE — 1036F TOBACCO NON-USER: CPT | Performed by: ORTHOPAEDIC SURGERY

## 2018-08-07 PROCEDURE — G8427 DOCREV CUR MEDS BY ELIG CLIN: HCPCS | Performed by: ORTHOPAEDIC SURGERY

## 2018-08-07 PROCEDURE — 1123F ACP DISCUSS/DSCN MKR DOCD: CPT | Performed by: ORTHOPAEDIC SURGERY

## 2018-08-07 PROCEDURE — G8420 CALC BMI NORM PARAMETERS: HCPCS | Performed by: ORTHOPAEDIC SURGERY

## 2018-08-07 PROCEDURE — G8400 PT W/DXA NO RESULTS DOC: HCPCS | Performed by: ORTHOPAEDIC SURGERY

## 2018-08-07 PROCEDURE — 4040F PNEUMOC VAC/ADMIN/RCVD: CPT | Performed by: ORTHOPAEDIC SURGERY

## 2018-08-07 PROCEDURE — 1090F PRES/ABSN URINE INCON ASSESS: CPT | Performed by: ORTHOPAEDIC SURGERY

## 2018-08-07 PROCEDURE — 1101F PT FALLS ASSESS-DOCD LE1/YR: CPT | Performed by: ORTHOPAEDIC SURGERY

## 2018-08-07 PROCEDURE — 99214 OFFICE O/P EST MOD 30 MIN: CPT | Performed by: ORTHOPAEDIC SURGERY

## 2018-08-07 NOTE — PROGRESS NOTES
CHIEF COMPLAINT:   1-Right ankle trimalleolar displaced fracture, status post ORIF, 1/8/2018. 2-Left knee pain/osteoarthritis. 3-Right posterior hip pain/sacral insufficiency fracture    HISTORY:  Curtis Ayala 76 y.o.  female who came in today for a follow up visit right ankle and posterior right hip pain. She presented to the [facility] emergency department on 5/4/2018 with increased pain and swelling in her right ankle. She had Dopplers done which were negative for a DVT. Rates pain a 0/10 VAS improving in right ankle and is doing well with that. She completed PT with improvement. She c/o intermittent achy pain in right posterior hip. Pain is worse with walking and better with rest. Pain is worse when laying on her right side at night. She has previously seen Dr. Jordan Baptiste for a right sacral insufficiency fracture in 6/20/2018. No numbness or tingling sensation. No fever or Chills. She reports that her left knee pain is improved after the cortisone injection given on 4/12/2018. Past Medical History:   Diagnosis Date    Allergic     Anxiety     Arthritis     C. difficile colitis 02/08/2018; 3/23/2018    Depression     aniexty.  DVT (deep venous thrombosis) ([facility]) age 59    LLE whie on hormone therapy    H/O migraine     Hx of blood clots     Hyperlipidemia     Movement disorder     osteoporosis    Unspecified cerebral artery occlusion with cerebral infarction     tia---march 2009, 10/30/2014    Unspecified diseases of blood and blood-forming organs     Factyor V Leiden    Vitamin D deficiency        Past Surgical History:   Procedure Laterality Date    CARPAL TUNNEL RELEASE      right    COLONOSCOPY      COSMETIC SURGERY      eye lids lifted years ago    FRACTURE SURGERY  01/08/2018    orif rt ankle    HIP SURGERY Right 4/21/15.     KNEE ARTHROSCOPY Bilateral     OTHER SURGICAL HISTORY Right     Gamma nail right hip    TONSILLECTOMY         Social History     Social History  Marital status:      Spouse name: N/A    Number of children: 2    Years of education: N/A     Occupational History    Not on file. Social History Main Topics    Smoking status: Never Smoker    Smokeless tobacco: Never Used    Alcohol use No    Drug use: No    Sexual activity: Not Currently     Other Topics Concern    Not on file     Social History Narrative    No narrative on file       Family History   Problem Relation Age of Onset    Stroke Mother     Arthritis Mother     Depression Mother     Heart Disease Father     Asthma Father     Heart Disease Sister     Heart Disease Brother        Current Outpatient Prescriptions on File Prior to Visit   Medication Sig Dispense Refill    rivaroxaban (Annette Boros) 20 MG TABS tablet Take 1 tablet by mouth daily (with breakfast) 90 tablet 3    Probiotic Product (PROBIOTIC-10) CAPS Take by mouth daily      mirtazapine (REMERON) 15 MG tablet Take 7.5 mg by mouth      Calcium Carb-Cholecalciferol (CALCIUM-VITAMIN D) 500-200 MG-UNIT per tablet Take 1 tablet by mouth      traZODone (DESYREL) 50 MG tablet Take 100 mg by mouth nightly       Multiple Vitamins-Minerals (THERAPEUTIC MULTIVITAMIN-MINERALS) tablet Take 1 tablet by mouth daily      alendronate (FOSAMAX) 70 MG tablet Take 70 mg by mouth every 7 days Takes on Saturday      Omega-3 Fatty Acids (FISH OIL) 1200 MG CAPS Take 1 tablet by mouth daily.  pravastatin (PRAVACHOL) 80 MG tablet Take 80 mg by mouth daily.  Coenzyme Q10-Levocarnitine (CO Q-10 PLUS PO) Take 100 mg by mouth daily       vitamin D (CHOLECALCIFEROL) 400 UNIT TABS tablet Take 5,000 Units by mouth daily. No current facility-administered medications on file prior to visit. Pertinent items are noted in HPI  Review of systems reviewed from Patient History Form dated on 1/23/2018 and available in the patient's chart under the Media tab. No change noted.         PHYSICAL EXAMINATION:  Ms. Reymundo Butler is a very pleasant 76 y.o.  female who presents today in no acute distress, awake, alert, and oriented. She is well dressed, nourished and  groomed. Patient with normal affect. Height is  5' 4\" (1.626 m), weight is 130 lb (59 kg), Body mass index is 22.31 kg/m². Resting respiratory rate is 16. Examination of the gait, showed that the patient walks heel-toe with a non-antalgic gait and minimal limp or assistance. The incision is healed well right ankle with no signs of erythema, drainage, mild swelling. Minimally decreased ROM right ankle. Examination of both knees showing full ROM, left mild crepitus, tenderness on medial joint line, stable to varus and valgus stress. She has intact sensation and good pedal pulses. She has good strength in 2 planes, and has mild tenderness on deep palpation over the medial joint line. Knee reflex 1+ bilaterally. Good strength, and no instability both upper and lower extremities. Ankle reflex 1+ bilaterally. She has mild tenderness to palpation over the right sacrum. She has no pain with range of motion bilateral hip. She has a negative straight leg raise. IMAGING:   Three views right ankle  taken today in the office showed anatomic alignment of the fracture, plate and screws in good position, no loosening. Ankle mortise is well centered. No new fracture seen. X-rays 3 views of the right hip and AP pelvis taken today in office reviewed and showed a healing sacrum fracture and right pubic ramus fracture. There is a gamma nail in place right femur with no signs of hardware failure. IMPRESSION:   1-Right ankle trimalleolar displaced fracture, status post ORIF, 1/8/2018. 2-Left knee pain/osteoarthritis. 3-Right pelvis sacral insufficiency fracture      PLAN: For the ankle: She can go back to normal activity with no restrictions. She will be seen PRN.  I told the patient that it is not unusual to have some achy pain and swelling for up to a year after a

## 2018-08-08 ENCOUNTER — TELEPHONE (OUTPATIENT)
Age: 78
End: 2018-08-08

## 2018-08-08 NOTE — TELEPHONE ENCOUNTER
Sorry, I will be out of town the last week of August. I believe there is an opening on September 4 at 9 or 10 am. I could also see her Sept 6 or 7 at 9 or 10:30 am.

## 2018-08-15 ENCOUNTER — OFFICE VISIT (OUTPATIENT)
Dept: ORTHOPEDIC SURGERY | Age: 78
End: 2018-08-15

## 2018-08-15 VITALS — BODY MASS INDEX: 22.36 KG/M2 | WEIGHT: 131 LBS | HEIGHT: 64 IN

## 2018-08-15 DIAGNOSIS — S32.10XD CLOSED FRACTURE OF SACRUM WITH ROUTINE HEALING, UNSPECIFIED FRACTURE MORPHOLOGY, SUBSEQUENT ENCOUNTER: Primary | ICD-10-CM

## 2018-08-15 PROCEDURE — 1090F PRES/ABSN URINE INCON ASSESS: CPT | Performed by: ORTHOPAEDIC SURGERY

## 2018-08-15 PROCEDURE — G8427 DOCREV CUR MEDS BY ELIG CLIN: HCPCS | Performed by: ORTHOPAEDIC SURGERY

## 2018-08-15 PROCEDURE — 4040F PNEUMOC VAC/ADMIN/RCVD: CPT | Performed by: ORTHOPAEDIC SURGERY

## 2018-08-15 PROCEDURE — G8400 PT W/DXA NO RESULTS DOC: HCPCS | Performed by: ORTHOPAEDIC SURGERY

## 2018-08-15 PROCEDURE — 1036F TOBACCO NON-USER: CPT | Performed by: ORTHOPAEDIC SURGERY

## 2018-08-15 PROCEDURE — 1101F PT FALLS ASSESS-DOCD LE1/YR: CPT | Performed by: ORTHOPAEDIC SURGERY

## 2018-08-15 PROCEDURE — 1123F ACP DISCUSS/DSCN MKR DOCD: CPT | Performed by: ORTHOPAEDIC SURGERY

## 2018-08-15 PROCEDURE — G8420 CALC BMI NORM PARAMETERS: HCPCS | Performed by: ORTHOPAEDIC SURGERY

## 2018-08-15 PROCEDURE — 99213 OFFICE O/P EST LOW 20 MIN: CPT | Performed by: ORTHOPAEDIC SURGERY

## 2018-08-15 NOTE — PROGRESS NOTES
MG-UNIT per tablet Take 1 tablet by mouth      traZODone (DESYREL) 50 MG tablet Take 100 mg by mouth nightly       Multiple Vitamins-Minerals (THERAPEUTIC MULTIVITAMIN-MINERALS) tablet Take 1 tablet by mouth daily      alendronate (FOSAMAX) 70 MG tablet Take 70 mg by mouth every 7 days Takes on Saturday      Omega-3 Fatty Acids (FISH OIL) 1200 MG CAPS Take 1 tablet by mouth daily.  pravastatin (PRAVACHOL) 80 MG tablet Take 80 mg by mouth daily.  Coenzyme Q10-Levocarnitine (CO Q-10 PLUS PO) Take 100 mg by mouth daily       vitamin D (CHOLECALCIFEROL) 400 UNIT TABS tablet Take 5,000 Units by mouth daily. No current facility-administered medications for this visit. Allergies   Allergen Reactions    Flagyl [Metronidazole] Diarrhea    Omnicef Diarrhea    Cephalexin Diarrhea    Penicillins Hives       REVIEW OF SYSTEMS:   Pertinent items are noted in HPI  Review of systems reviewed from Patient History Form dated on 8/15/18 and available in the patient's chart under the Media tab. Examination:    General Exam:    Vitals: Height 5' 4\" (1.626 m), weight 131 lb (59.4 kg), not currently breastfeeding. Constitutional: Patient is adequately groomed with no evidence of malnutrition  Mental Status: The patient is oriented to time, place and person. The patient's mood and affect are appropriate. Lymphatic: The lymphatic examination bilaterally reveals all areas to be without enlargement or induration. Vascular: Examination reveals no swelling or calf tenderness. Peripheral pulses are palpable and 2+. Neurological: The patient has good coordination. There is no weakness or sensory deficit. Skin:    Head/Neck: inspection reveals no rashes, ulcerations or lesions. Trunk:  inspection reveals no rashes, ulcerations or lesions. Right Lower Extremity: inspection reveals no rashes, ulcerations or lesions.   Left Lower Extremity: inspection reveals no rashes, ulcerations or lesions. Hip Examination  Inspection:  No obvious deformity    Palpation:  No pain to palpation    Rang of Motion:  Excellent hip range of motion bilaterally    Strength:  Quadricep, hamstring, EHL, hip flexor, internal and external rotation of the hip against resistance, all 5 over 5 equal bilaterally    Special Tests:  Pedal pulse are +2 over 4 capillary refill is brisk sensation is intact negative Homans negative Dm negative straight leg raise good hip range of motion    Skin: There are no rashes, ulcerations or lesions. Gait: Normal    Additional Comments:     Additional Examinations:  Thoracic Spine: Examination of the thoracic spine does not show any tenderness, deformity or injury. Range of motion is unremarkable. There is no gross instability. There are no rashes, ulcerations or lesions. Strength and tone are normal.  Lower Back: Examination of the lower back does not show any tenderness, deformity or injury. Range of motion is unremarkable. There is no gross instability. There are no rashes, ulcerations or lesions. Strength and tone are normal.    Assessment:  Sacral insufficiency fracture    Impression: Completely resolved    Office Procedures:  No orders of the defined types were placed in this encounter. Treatment Plan: Activities as tolerated including her  follow-up as needed      Man Pablo. GISELL Erazo. [address] [place] [address] 20 and Sports Medicine  [facility]  Board Certified  [employer] Team Physician      Disclaimer: \"This note was dictated with voice recognition software. Though review and correction are routine, we apologize for any errors. \"

## 2018-09-01 ENCOUNTER — HOSPITAL ENCOUNTER (OUTPATIENT)
Dept: OTHER | Age: 78
Discharge: HOME | End: 2018-09-01
Attending: ORTHOPAEDIC SURGERY | Admitting: ORTHOPAEDIC SURGERY

## 2018-09-06 ENCOUNTER — HOSPITAL ENCOUNTER (OUTPATIENT)
Dept: GENERAL RADIOLOGY | Age: 78
Discharge: HOME | End: 2018-09-06
Payer: MEDICARE

## 2018-09-06 ENCOUNTER — OFFICE VISIT (OUTPATIENT)
Age: 78
End: 2018-09-06

## 2018-09-06 ENCOUNTER — TELEPHONE (OUTPATIENT)
Age: 78
End: 2018-09-06

## 2018-09-06 ENCOUNTER — PROCEDURE VISIT (OUTPATIENT)
Age: 78
End: 2018-09-06

## 2018-09-06 VITALS
WEIGHT: 139.6 LBS | DIASTOLIC BLOOD PRESSURE: 78 MMHG | HEIGHT: 62 IN | BODY MASS INDEX: 25.69 KG/M2 | SYSTOLIC BLOOD PRESSURE: 135 MMHG

## 2018-09-06 DIAGNOSIS — M81.0 AGE-RELATED OSTEOPOROSIS WITHOUT CURRENT PATHOLOGICAL FRACTURE: ICD-10-CM

## 2018-09-06 DIAGNOSIS — M81.0 POSTMENOPAUSAL OSTEOPOROSIS: Primary | ICD-10-CM

## 2018-09-06 DIAGNOSIS — M81.0 AGE-RELATED OSTEOPOROSIS WITHOUT CURRENT PATHOLOGICAL FRACTURE: Primary | ICD-10-CM

## 2018-09-06 PROCEDURE — 77080 DXA BONE DENSITY AXIAL: CPT | Performed by: INTERNAL MEDICINE

## 2018-09-06 PROCEDURE — 1090F PRES/ABSN URINE INCON ASSESS: CPT | Performed by: INTERNAL MEDICINE

## 2018-09-06 PROCEDURE — 4040F PNEUMOC VAC/ADMIN/RCVD: CPT | Performed by: INTERNAL MEDICINE

## 2018-09-06 PROCEDURE — G8399 PT W/DXA RESULTS DOCUMENT: HCPCS | Performed by: INTERNAL MEDICINE

## 2018-09-06 PROCEDURE — 1123F ACP DISCUSS/DSCN MKR DOCD: CPT | Performed by: INTERNAL MEDICINE

## 2018-09-06 PROCEDURE — 99205 OFFICE O/P NEW HI 60 MIN: CPT | Performed by: INTERNAL MEDICINE

## 2018-09-06 PROCEDURE — G8419 CALC BMI OUT NRM PARAM NOF/U: HCPCS | Performed by: INTERNAL MEDICINE

## 2018-09-06 PROCEDURE — 1101F PT FALLS ASSESS-DOCD LE1/YR: CPT | Performed by: INTERNAL MEDICINE

## 2018-09-06 PROCEDURE — 1036F TOBACCO NON-USER: CPT | Performed by: INTERNAL MEDICINE

## 2018-09-06 PROCEDURE — G8427 DOCREV CUR MEDS BY ELIG CLIN: HCPCS | Performed by: INTERNAL MEDICINE

## 2018-09-06 PROCEDURE — 77080 DXA BONE DENSITY AXIAL: CPT

## 2018-09-06 NOTE — PROGRESS NOTES
[facility] ([facility]) Osteoporosis and [address]  [address] [facility] [place], [address] [facility]  Phone [phone]  Fax [phone]    NAME:  Juliene Phalen  :  1940  CONSULT DATE:    2018  MOST RECENT VISIT:  2018  TODAYS DATE:  2018    Labs @ [facility] 2018  Son Umberto Monday has power of  [phone], [email or web address]    CONSULTATION REQUESTED BY: Deja Cannon MD  OTHERS WHO NEED REPORTS: Yohan George MD    PROBLEMS. Osteoporosis  or before, DXA 10/2013, lowest T-score -3.1 in the 33% radius    Family history of osteoporosis, mother, sister    BMD decreased 8547-7970, 8051-6462, 3528-0984 and 9331-4576, 7051-5419, lowest T-score -3.0 LFN    2015 R intertrochanteric femur fracture    2018 R tri-malleolar ankle fx    2018 sacral fracture  Natural menopause age uncertain  Factor V Leiden  C diff after fracture repair  TIAs  Anxiety    CURRENT MANAGEMENT FOR OSTEOPOROSIS. Calcium, 300 mg from low calcium foods,  450 mg almond milk, 300 mg yogurt, 300 mg fortified OJ   diet MVI Ca+D other total    Calcium 1350  600   mg/d   Vitamin D   800   IU/d     25-OH D 71 ng/mL 2017 (desirable is 30-60 ng/mL)  Exercise, 2 x weekly with   Pharmacologic therapy: Fosamax weekly started     PREVIOUS MEDICATIONS FOR OSTEOPOROSIS. none    OTHER CURRENT MEDICATIONS (SELECTED): Xarelto, pravastatin, trazadone, Remeron  OTC MEDICATIONS (SELECTED): CoQ10, fish oil, omega 3, probiotic    CHIEF COMPLAINT. Fractures    HISTORY OF PRESENT ILLNESS: See problem list for chronic/inactive conditions. Ms. Iglesia Riley is a 25-year-old woman who was found to have osteoporosis many years ago. The earliest DXA I have that shows osteoporosis is from . She has been taking alendronate correctly and without side effects. She is here today with her son who has traveled in from [place].     FOR FULL DETAILS OF FAMILY HISTORY, PAST MEDICAL AND SURGICAL HISTORY, SOCIAL HISTORY, AND REVIEW OF SYSTEMS, SEE PATIENT QUESTIONNAIRE OF TODAYS DATE. FAMILY HISTORY. Relevant hx in problem list and/or HPI. Otherwise not contributory. PAST MEDICAL HISTORY. Noted in health history form. PAST SURGICAL HISTORY. Noted in health history form. SOCIAL HISTORY. Nonsmoker. No excessive intake of alcohol, caffeine or sodas. Lives alone. REVIEW OF SYSTEMS. No recent significant change in weight. PHYSICAL EXAMINATION. GENERAL. Well-nourished, well-developed, normally proportioned adult. MENTAL STATUS. Pleasant mood. Oriented to time, place, and person. ORAL. Teeth appear to be in good condition. SKIN. Normal texture and turgor. LUNGS. Clear to auscultation. Breath sounds normal.  HEART. Heart sounds normal, no murmur or gallop. MUSCULOSKELETAL. The examination included inspection/palpation (any misalignment, asymmetry, crepitation, defects, tenderness, masses, effusions is noted), assessment of range of motion (any presence of pain, crepitation, contracture is noted), assessment of stability (any dislocation, subluxation, laxity is noted), assessment of muscle strength and tone (any atrophy or abnormal movements is noted). Pelvis appears normal.  Spinal contours are normal.  No spine tenderness to palpation or percussion. Three finger spaces between ribs and pelvis. Gait steady without assistance. NEUROLOGICAL. Not able to rise from chair without using arms. No apparent motor or sensory deficit. Coordination appears normal     BONE DENSITY. Most recent done here using Hologic equipment. The 2013 study was done at [facility]. I deleted L1 due to T-score discrepancy. I dont have the printouts for 1710-2441. T-scores  Initial study: 10/27/2015* L2-L4 -1.7 left fem. neck -2.4   Current study: 09/06/2018 L2-L4 -1.9 left fem.  neck -3.0     The table below shows bone mineral density (grams/cm2), the appropriate measure for comparing serial scans. A significant increase or decrease is based on precision studies done at our center according to the ISCD protocol with a least significant change of 0.030 g/cm2. PA spine Proximal Femur (left)   Date L2-L4 Fem. neck Trochanter Total hip   09/15/2004* --- 0.769 --- 0.810   09/18/2007* --- 0.784 --- 0.816   06/22/2010* --- 0.726 --- 0.747   09/04/2012* --- 0.741 --- 0.750   10/18/2013* --- 0.605 --- 0.703   10/27/2015* 0.889 0.573 --- 0.668   11/22/2016* 0.866 0.543 --- 0.673   09/06/2018 0.871 0.512 0.514 0.629   * Done with [employer] 428 equipment. BMD converted to Hologic units. Osteoporosis, BMD lower than desirable. BMD decreased in the femoral neck 6256-4297, 5876-0680, 5232-4498 and 5382-3870 and in the total hip 8213-5426, 0053-0674, 6640-5573 and 7295-5208. Labs: 07/2018 Ca 9.0 Cr 0.9. Imaging: DXA printouts reviewed. 06/2018 pelvis CT LS MR.    ASSESSMENT. Osteoporosis, bone density lower than desirable. Despite treatment with Fosamax since ~2009, BMD has decreased and she has had multiple fractures. DIAGNOSTIC PLANS. In 2 weeks (or longer), on appropriate calcium intake, 24-hour urine for calcium, creatinine and sodium. I will be in touch with the patient to review lab results. THERAPEUTIC PLANS. Calcium, target 1200 mg daily; we discussed recommended calcium intake and the effects of excessive calcium intake from supplements. I provided a handout with information about how to calculate daily calcium intake. Advised to stop calcium supplements. Vitamin D, current vitamin D intake is fine. Exercise, current exercise program is fine. Pharmacologic therapy, we discussed treatment options for osteoporosis; alendronate, Actonel, Atelvia, Reclast and Prolia. I recommended treatment with Prolia and she agreed. We discussed efficacy, safety and side effects. We will make the necessary arrangements. Return appointment with DXA in 1 year.     I spent

## 2018-09-06 NOTE — PROGRESS NOTES
[facility]) Osteoporosis and [address] [facility], [address] [place] [address]   Phone [phone]  Fax [phone]    NAME: Carl Solorzano   : 1940   STUDY DATE: 2018     REFERRING PROVIDER: Adiel Grimm MD    INDICATION(S) FOR PERFORMING THE STUDY:  osteoporosis, age related (M81.0)    CLINICAL INFORMATION PROVIDED BY THE PATIENT: 66-year-old woman. Natural menopause years ago. She fractured her right hip in , ankle 2018 and sacrum 2018. No long-term corticosteroid use. She currently takes alendronate (started  or before). EQUIPMENT: [employer]. POSITIONING: Good. REGIONS OF INTEREST: Correct. ARTIFACTS: None. STUDY VALID? Yes. Spine BMD is spuriously high because of generalized degenerative change. No adjustments were made.     -scores  Initial study: 10/27/2015* L2-L4 -1.7 left fem. neck -2.4   Current study: 2018 L2-L4 -1.9 left fem. neck -3.0     The table below shows bone mineral density (grams/cm2), the appropriate measure for comparing serial scans. A significant increase or decrease is based on precision studies done at our center according to the ISCD protocol with a least significant change of 0.030 g/cm2. PA spine Proximal Femur (left)   Date L2-L4 Fem. neck Trochanter Total hip   09/15/2004* --- 0.769 --- 0.810   2007* --- 0.784 --- 0.816   2010* --- 0.726 --- 0.747   2012* --- 0.741 --- 0.750   10/18/2013* --- 0.605 --- 0.703   10/27/2015* 0.889 0.573 --- 0.668   2016* 0.866 0.543 --- 0.673   2018 0.871 0.512 0.514 0.629     IMPRESSION:  OSTEOPOROSIS, BMD LOWER THAN DESIRABLE. BMD DECREASED IN THE FEMORAL NECK 6231-1848, 1236-7049, 4668-7515 AND 7799-0417 AND IN THE TOTAL HIP 3363-6970, 6324-4989, 4823-9931 AND 7160-0703. Consider repeating this study in 1-2 years to assess the patient's progress. _________________________________________________   Glorianne Gilford Melton Larve MD, Director, [facility] ([facility]) Osteoporosis and [employer]

## 2018-09-07 NOTE — TELEPHONE ENCOUNTER
Spoke with pt's son and advised per Dr. Kasey Wheat pt can continue Marcela basic vitamins. Son asked if pt is to continue the Fosamax and per Yamileth pt is to continue Fosamax until Prolia is started.      Please advise

## 2018-09-19 ENCOUNTER — TELEPHONE (OUTPATIENT)
Dept: CARDIOLOGY CLINIC | Age: 78
End: 2018-09-19

## 2018-09-19 ENCOUNTER — NURSE ONLY (OUTPATIENT)
Age: 78
End: 2018-09-19

## 2018-09-19 DIAGNOSIS — M81.0 POSTMENOPAUSAL OSTEOPOROSIS: Primary | ICD-10-CM

## 2018-09-19 PROCEDURE — 96372 THER/PROPH/DIAG INJ SC/IM: CPT | Performed by: INTERNAL MEDICINE

## 2018-09-19 NOTE — PROGRESS NOTES
Informed patient if any signs of redness,rash,swelling or unusual symptoms occur, please contact the office. Prolia given per physician order. Prolia supplied by the physician office. It is the patient's responsibility to notify the physician office of new insurance plan prior to appointment to prevent delay in treatment. Please send a copy of the front and back of the insurance card either by fax, mail or stop by the office. Patient to remain in the waiting room for approximately 20 minutes due to this is the first Prolia injection.

## 2018-09-26 ENCOUNTER — OFFICE VISIT (OUTPATIENT)
Dept: ORTHOPEDIC SURGERY | Age: 78
End: 2018-09-26
Payer: MEDICARE

## 2018-09-26 VITALS
DIASTOLIC BLOOD PRESSURE: 78 MMHG | HEIGHT: 64 IN | WEIGHT: 139 LBS | BODY MASS INDEX: 23.73 KG/M2 | SYSTOLIC BLOOD PRESSURE: 130 MMHG

## 2018-09-26 DIAGNOSIS — S32.10XD CLOSED FRACTURE OF SACRUM WITH ROUTINE HEALING, UNSPECIFIED FRACTURE MORPHOLOGY, SUBSEQUENT ENCOUNTER: Primary | ICD-10-CM

## 2018-09-26 PROCEDURE — 1036F TOBACCO NON-USER: CPT | Performed by: ORTHOPAEDIC SURGERY

## 2018-09-26 PROCEDURE — G8420 CALC BMI NORM PARAMETERS: HCPCS | Performed by: ORTHOPAEDIC SURGERY

## 2018-09-26 PROCEDURE — 1123F ACP DISCUSS/DSCN MKR DOCD: CPT | Performed by: ORTHOPAEDIC SURGERY

## 2018-09-26 PROCEDURE — 4040F PNEUMOC VAC/ADMIN/RCVD: CPT | Performed by: ORTHOPAEDIC SURGERY

## 2018-09-26 PROCEDURE — G8427 DOCREV CUR MEDS BY ELIG CLIN: HCPCS | Performed by: ORTHOPAEDIC SURGERY

## 2018-09-26 PROCEDURE — 1090F PRES/ABSN URINE INCON ASSESS: CPT | Performed by: ORTHOPAEDIC SURGERY

## 2018-09-26 PROCEDURE — 99213 OFFICE O/P EST LOW 20 MIN: CPT | Performed by: ORTHOPAEDIC SURGERY

## 2018-09-26 PROCEDURE — G8399 PT W/DXA RESULTS DOCUMENT: HCPCS | Performed by: ORTHOPAEDIC SURGERY

## 2018-09-26 PROCEDURE — 1101F PT FALLS ASSESS-DOCD LE1/YR: CPT | Performed by: ORTHOPAEDIC SURGERY

## 2018-10-01 ENCOUNTER — TELEPHONE (OUTPATIENT)
Dept: ENDOCRINOLOGY | Age: 78
End: 2018-10-01

## 2018-10-01 NOTE — TELEPHONE ENCOUNTER
----- Message from Ileana Joseph MD sent at 9/28/2018  4:14 PM EDT -----  Please call [facility], [phone] to get the volume of the 24-hour urine. Thanks.     Ref Range & Units 4d ago  Calcium, Urine Not Estab. mg/dL 1.1   Calcium, 24H Urine 100.0 - 300.0 mg/24 hr 1.1    Resulting Agency  [facility]  Narrative       Performed at: [address] [place] [id number]#[phone]   : Aisha Barr PhD

## 2018-10-02 DIAGNOSIS — M81.0 AGE-RELATED OSTEOPOROSIS WITHOUT CURRENT PATHOLOGICAL FRACTURE: Primary | ICD-10-CM

## 2018-10-04 ENCOUNTER — TELEPHONE (OUTPATIENT)
Dept: ENDOCRINOLOGY | Age: 78
End: 2018-10-04

## 2018-10-11 ENCOUNTER — TELEPHONE (OUTPATIENT)
Dept: ENDOCRINOLOGY | Age: 78
End: 2018-10-11

## 2018-10-24 DIAGNOSIS — E83.59 HYPOCALCIURIA: Primary | Chronic | ICD-10-CM

## 2018-10-24 RX ORDER — CALCITRIOL 0.25 UG/1
0.25 CAPSULE, LIQUID FILLED ORAL DAILY
Qty: 90 CAPSULE | Refills: 0 | Status: SHIPPED | OUTPATIENT
Start: 2018-10-24 | End: 2019-02-25 | Stop reason: SDUPTHER

## 2018-10-25 ENCOUNTER — HOSPITAL ENCOUNTER (OUTPATIENT)
Dept: VASCULAR LAB | Age: 78
Discharge: HOME | End: 2018-10-25
Payer: MEDICARE

## 2018-10-25 ENCOUNTER — TELEPHONE (OUTPATIENT)
Dept: CARDIOLOGY CLINIC | Age: 78
End: 2018-10-25

## 2018-10-25 DIAGNOSIS — M79.89 LEFT LEG SWELLING: Primary | ICD-10-CM

## 2018-10-25 PROCEDURE — 93971 EXTREMITY STUDY: CPT

## 2018-10-25 NOTE — TELEPHONE ENCOUNTER
Pt called back stating she was getting ready to come see LES before her US. I let her know LES message to gp to ER. She states she will.

## 2018-10-26 ENCOUNTER — TELEPHONE (OUTPATIENT)
Dept: CARDIOLOGY CLINIC | Age: 78
End: 2018-10-26

## 2018-10-26 NOTE — TELEPHONE ENCOUNTER
Spoke with son as per LES. [facility] Sts he has his mom elevating her legs and \"taking it easy this weekend\".

## 2018-11-21 ENCOUNTER — OFFICE VISIT (OUTPATIENT)
Dept: CARDIOLOGY CLINIC | Age: 78
End: 2018-11-21
Payer: MEDICARE

## 2018-11-21 VITALS
HEART RATE: 84 BPM | HEIGHT: 63 IN | DIASTOLIC BLOOD PRESSURE: 68 MMHG | BODY MASS INDEX: 25.52 KG/M2 | SYSTOLIC BLOOD PRESSURE: 112 MMHG | WEIGHT: 144 LBS

## 2018-11-21 DIAGNOSIS — D68.51 FACTOR V LEIDEN (HCC): Primary | ICD-10-CM

## 2018-11-21 DIAGNOSIS — G45.9 TIA (TRANSIENT ISCHEMIC ATTACK): ICD-10-CM

## 2018-11-21 DIAGNOSIS — I10 HTN (HYPERTENSION), BENIGN: ICD-10-CM

## 2018-11-21 PROCEDURE — G8427 DOCREV CUR MEDS BY ELIG CLIN: HCPCS | Performed by: INTERNAL MEDICINE

## 2018-11-21 PROCEDURE — 99214 OFFICE O/P EST MOD 30 MIN: CPT | Performed by: INTERNAL MEDICINE

## 2018-11-21 PROCEDURE — 1123F ACP DISCUSS/DSCN MKR DOCD: CPT | Performed by: INTERNAL MEDICINE

## 2018-11-21 PROCEDURE — 1036F TOBACCO NON-USER: CPT | Performed by: INTERNAL MEDICINE

## 2018-11-21 PROCEDURE — 4040F PNEUMOC VAC/ADMIN/RCVD: CPT | Performed by: INTERNAL MEDICINE

## 2018-11-21 PROCEDURE — G8419 CALC BMI OUT NRM PARAM NOF/U: HCPCS | Performed by: INTERNAL MEDICINE

## 2018-11-21 PROCEDURE — G8399 PT W/DXA RESULTS DOCUMENT: HCPCS | Performed by: INTERNAL MEDICINE

## 2018-11-21 PROCEDURE — 1090F PRES/ABSN URINE INCON ASSESS: CPT | Performed by: INTERNAL MEDICINE

## 2018-11-21 PROCEDURE — G8484 FLU IMMUNIZE NO ADMIN: HCPCS | Performed by: INTERNAL MEDICINE

## 2018-11-21 PROCEDURE — 1101F PT FALLS ASSESS-DOCD LE1/YR: CPT | Performed by: INTERNAL MEDICINE

## 2018-12-13 ENCOUNTER — TELEPHONE (OUTPATIENT)
Dept: ENDOCRINOLOGY | Age: 78
End: 2018-12-13

## 2018-12-18 NOTE — TELEPHONE ENCOUNTER
Pt's son is coming from [place] to take his mother to her DEXA scan. He is hoping to make an appt later in the week, hopefully 1/31. I told him that dexas are only done on Mondays and Tuesdays but he wanted to see if there was any possible way to be seen later in the week. Please advise due to 1/31/19 is on a Thursday for a follow up visit.

## 2019-01-14 ENCOUNTER — TELEPHONE (OUTPATIENT)
Dept: ENDOCRINOLOGY | Age: 79
End: 2019-01-14

## 2019-01-16 ENCOUNTER — TELEPHONE (OUTPATIENT)
Dept: CARDIOLOGY CLINIC | Age: 79
End: 2019-01-16

## 2019-01-17 ENCOUNTER — TELEPHONE (OUTPATIENT)
Dept: ENDOCRINOLOGY | Age: 79
End: 2019-01-17

## 2019-01-22 ENCOUNTER — TELEPHONE (OUTPATIENT)
Dept: ENDOCRINOLOGY | Age: 79
End: 2019-01-22

## 2019-02-25 ENCOUNTER — TELEPHONE (OUTPATIENT)
Dept: ENDOCRINOLOGY | Age: 79
End: 2019-02-25

## 2019-02-25 ENCOUNTER — OFFICE VISIT (OUTPATIENT)
Dept: ENDOCRINOLOGY | Age: 79
End: 2019-02-25
Payer: MEDICARE

## 2019-02-25 VITALS
HEIGHT: 63 IN | SYSTOLIC BLOOD PRESSURE: 110 MMHG | WEIGHT: 145 LBS | OXYGEN SATURATION: 98 % | DIASTOLIC BLOOD PRESSURE: 80 MMHG | HEART RATE: 84 BPM | BODY MASS INDEX: 25.69 KG/M2

## 2019-02-25 DIAGNOSIS — M81.0 AGE-RELATED OSTEOPOROSIS WITHOUT CURRENT PATHOLOGICAL FRACTURE: Primary | ICD-10-CM

## 2019-02-25 DIAGNOSIS — Z86.73 HISTORY OF TIA (TRANSIENT ISCHEMIC ATTACK) AND STROKE: ICD-10-CM

## 2019-02-25 DIAGNOSIS — E78.2 MIXED HYPERLIPIDEMIA: Chronic | ICD-10-CM

## 2019-02-25 DIAGNOSIS — I10 HTN (HYPERTENSION), BENIGN: ICD-10-CM

## 2019-02-25 DIAGNOSIS — E55.9 VITAMIN D DEFICIENCY: ICD-10-CM

## 2019-02-25 PROCEDURE — 1036F TOBACCO NON-USER: CPT | Performed by: INTERNAL MEDICINE

## 2019-02-25 PROCEDURE — 1090F PRES/ABSN URINE INCON ASSESS: CPT | Performed by: INTERNAL MEDICINE

## 2019-02-25 PROCEDURE — G8399 PT W/DXA RESULTS DOCUMENT: HCPCS | Performed by: INTERNAL MEDICINE

## 2019-02-25 PROCEDURE — 4040F PNEUMOC VAC/ADMIN/RCVD: CPT | Performed by: INTERNAL MEDICINE

## 2019-02-25 PROCEDURE — G8427 DOCREV CUR MEDS BY ELIG CLIN: HCPCS | Performed by: INTERNAL MEDICINE

## 2019-02-25 PROCEDURE — G8484 FLU IMMUNIZE NO ADMIN: HCPCS | Performed by: INTERNAL MEDICINE

## 2019-02-25 PROCEDURE — G8419 CALC BMI OUT NRM PARAM NOF/U: HCPCS | Performed by: INTERNAL MEDICINE

## 2019-02-25 PROCEDURE — 1101F PT FALLS ASSESS-DOCD LE1/YR: CPT | Performed by: INTERNAL MEDICINE

## 2019-02-25 PROCEDURE — 99215 OFFICE O/P EST HI 40 MIN: CPT | Performed by: INTERNAL MEDICINE

## 2019-02-25 PROCEDURE — 1123F ACP DISCUSS/DSCN MKR DOCD: CPT | Performed by: INTERNAL MEDICINE

## 2019-02-26 ENCOUNTER — TELEPHONE (OUTPATIENT)
Dept: ENDOCRINOLOGY | Age: 79
End: 2019-02-26

## 2019-02-26 DIAGNOSIS — M81.0 AGE-RELATED OSTEOPOROSIS WITHOUT CURRENT PATHOLOGICAL FRACTURE: Primary | Chronic | ICD-10-CM

## 2019-02-26 RX ORDER — CALCITRIOL 0.25 UG/1
0.25 CAPSULE, LIQUID FILLED ORAL DAILY
Qty: 90 CAPSULE | Refills: 0 | Status: SHIPPED | OUTPATIENT
Start: 2019-02-26

## 2019-03-20 ENCOUNTER — TELEPHONE (OUTPATIENT)
Dept: ENDOCRINOLOGY | Age: 79
End: 2019-03-20

## 2019-05-08 ENCOUNTER — TELEPHONE (OUTPATIENT)
Dept: CARDIOLOGY CLINIC | Age: 79
End: 2019-05-08

## 2019-05-08 NOTE — TELEPHONE ENCOUNTER
Pt son (POA) calling, he usually comes with Xiomara Price to her appts however he is out in [place] and has been sick the last 3 days and his Dr. Kaushik Hensley that he shouldn't fly. Pt has an appt with LES tomorrow 5-9. He has asked a friend of the family come with pt to the appt. However he would like to joint in on the ov by phone. He would like to know if this will be okay with LES? Pls call to advise either way. Thank you.

## 2019-05-09 ENCOUNTER — OFFICE VISIT (OUTPATIENT)
Dept: CARDIOLOGY CLINIC | Age: 79
End: 2019-05-09
Payer: MEDICARE

## 2019-05-09 VITALS
DIASTOLIC BLOOD PRESSURE: 66 MMHG | BODY MASS INDEX: 23.16 KG/M2 | WEIGHT: 139 LBS | RESPIRATION RATE: 16 BRPM | SYSTOLIC BLOOD PRESSURE: 128 MMHG | HEIGHT: 65 IN | HEART RATE: 82 BPM

## 2019-05-09 DIAGNOSIS — D68.51 FACTOR V LEIDEN (HCC): ICD-10-CM

## 2019-05-09 DIAGNOSIS — I10 HTN (HYPERTENSION), BENIGN: Primary | ICD-10-CM

## 2019-05-09 DIAGNOSIS — I65.23 CAROTID STENOSIS, BILATERAL: ICD-10-CM

## 2019-05-09 DIAGNOSIS — E78.2 MIXED HYPERLIPIDEMIA: Chronic | ICD-10-CM

## 2019-05-09 PROCEDURE — 4040F PNEUMOC VAC/ADMIN/RCVD: CPT | Performed by: INTERNAL MEDICINE

## 2019-05-09 PROCEDURE — G8427 DOCREV CUR MEDS BY ELIG CLIN: HCPCS | Performed by: INTERNAL MEDICINE

## 2019-05-09 PROCEDURE — G8598 ASA/ANTIPLAT THER USED: HCPCS | Performed by: INTERNAL MEDICINE

## 2019-05-09 PROCEDURE — 1036F TOBACCO NON-USER: CPT | Performed by: INTERNAL MEDICINE

## 2019-05-09 PROCEDURE — 99214 OFFICE O/P EST MOD 30 MIN: CPT | Performed by: INTERNAL MEDICINE

## 2019-05-09 PROCEDURE — 1123F ACP DISCUSS/DSCN MKR DOCD: CPT | Performed by: INTERNAL MEDICINE

## 2019-05-09 PROCEDURE — G8420 CALC BMI NORM PARAMETERS: HCPCS | Performed by: INTERNAL MEDICINE

## 2019-05-09 PROCEDURE — G8399 PT W/DXA RESULTS DOCUMENT: HCPCS | Performed by: INTERNAL MEDICINE

## 2019-05-09 PROCEDURE — 1090F PRES/ABSN URINE INCON ASSESS: CPT | Performed by: INTERNAL MEDICINE

## 2019-05-09 RX ORDER — LANOLIN ALCOHOL/MO/W.PET/CERES
3 CREAM (GRAM) TOPICAL DAILY
COMMUNITY

## 2019-05-09 NOTE — PROGRESS NOTES
Cardiac Follow Up    Referring Provider:  Twin Gracia     Chief Complaint   Patient presents with    Hyperlipidemia    Hypertension      Factor V Leiden     Had broken right ankle in Jan. 2018, Feels much better. June 14, 2018 had cognitive testing. History of Present Illness:  Krupa Sutherland is a [de-identified] y.o. female here in follow up. She has seen my partner, Dr. Iza Amaro, in the past. As you recall, she was seen as a new patient at the request of Dr. Devin Thrasher for concerns of ASD noted in 1996. 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 Corrinne Members in hematology. She also has a history of TIA, and hyperlipidemia. In 1996, she had an episode of blurry eye sight with hand numbness that was diagnosed as a complicated migraine. She had an echo with bubble study April 4, 2014 - LV normal with LVEF 79-67%, diastolic dysfunction, trace MR, and moderate TR. Krupa Sutherland was admitted to [facility] 10/30/14 with slurred speech, difficulty finding words, and transient headaches. She was transferred to [facility] and evaluated by oncology, neurology, and cardiology. She thinks the cause for symptoms was likely because of sub therapeutic INR and TIA. Mary Mcgeerenita She had an echo with bubble study 1/18/2016 that did not document an ASD. In 2015 she fell and fractured her hip and had surgery. Krupa Sutherland reports having TIA's from time to time. In June 2016, she was on the phone with her son who lives in [facility], and she couldn't put two words together. She ended up being admitted to [facility] for three days with a TIA. She was on coumadin at the time with therapeutic INR's. Her neurologist is Dr. Megan Rosenberg. ARIN done in 12/27/16 showed no evidence of shunting, mild MR. Due to failure of coumadin she was switched to xarelto     Today, she is doing well. She has been walking and going to a fitness center 2x weekly. Continues to see Everardo Vargas geriatrician at [facility].   Has dx her with either early or mild vascular dementia. She had an assessment in January which showed improvent. Her son is sick was unable to be here for the visit, she called him on speaker phone for the visit. Her friend is here with her. Denies exertional chest pain, CHOUDHURY/PND, palpitations, light-headedness, edema. Past Medical History:   has a past medical history of Allergic, Anxiety, Arthritis, C. difficile colitis, Depression, DVT (deep venous thrombosis) ([facility] [address] Fracture, sacrum/coccyx ([facility] [address] H/O migraine, Hx of blood clots, Hyperlipidemia, Movement disorder, Unspecified cerebral artery occlusion with cerebral infarction, Unspecified diseases of blood and blood-forming organs, and Vitamin D deficiency. Surgical History:   has a past surgical history that includes Carpal tunnel release; Knee arthroscopy (Bilateral); Colonoscopy; Tonsillectomy; other surgical history (Right); hip surgery (Right, 4/21/15.); fracture surgery (01/08/2018); and Cosmetic surgery. Social History:   reports that she has never smoked. She has never used smokeless tobacco. She reports that she does not drink alcohol or use drugs. Family History:  family history includes Arthritis in her mother; Asthma in her father; Depression in her mother; Heart Disease in her brother, father, and sister; Stroke in her mother.      Home medications:    Current Outpatient Medications:     melatonin 3 MG TABS tablet, Take 3 mg by mouth daily, Disp: , Rfl:     calcitRIOL (ROCALTROL) 0.25 MCG capsule, Take 1 capsule by mouth daily, Disp: 90 capsule, Rfl: 0    Coenzyme Q10 (COQ10 PO), Take by mouth, Disp: , Rfl:     rivaroxaban (XARELTO) 20 MG TABS tablet, Take 1 tablet by mouth daily (with breakfast), Disp: 90 tablet, Rfl: 3    Probiotic Product (PROBIOTIC-10) CAPS, Take by mouth daily, Disp: , Rfl:     Calcium Carb-Cholecalciferol (CALCIUM-VITAMIN D) 500-200 MG-UNIT per tablet, Take 1 tablet by mouth , Disp: , Rfl:     traZODone (DESYREL) 50 MG tablet, Take 100 mg by mouth nightly , Disp: , Rfl:     Multiple Vitamins-Minerals (THERAPEUTIC MULTIVITAMIN-MINERALS) tablet, Take 1 tablet by mouth daily, Disp: , Rfl:     Omega-3 Fatty Acids (FISH OIL) 1200 MG CAPS, Take 1 tablet by mouth daily. , Disp: , Rfl:     pravastatin (PRAVACHOL) 80 MG tablet, Take 80 mg by mouth daily. , Disp: , Rfl:     vitamin D (CHOLECALCIFEROL) 400 UNIT TABS tablet, Take 5,000 Units by mouth daily. , Disp: , Rfl:     PROLIA 60 MG/ML SOLN SC injection, Inject 1 mL into the skin once for 1 dose Every 6 month., Disp: 1 mL, Rfl: 0        Allergies:  Flagyl [metronidazole]; Omnicef; Cephalexin; and Penicillins     Review of Systems:   · Constitutional: there has been no unanticipated weight loss. There's been no change in energy level, sleep pattern, or activity level. · Eyes: No visual changes or diplopia. No scleral icterus. · ENT: No Headaches, hearing loss or vertigo. No mouth sores or sore throat. · Cardiovascular: Reviewed in HPI  · Respiratory: No cough or wheezing, no sputum production. No hematemesis. · Gastrointestinal: No abdominal pain, appetite loss, blood in stools. No change in bowel or bladder habits. · Genitourinary: No dysuria, trouble voiding, or hematuria. · Musculoskeletal:  No gait disturbance, weakness or joint complaints. · Integumentary: No rash or pruritis. · Neurological: No headache, diplopia, change in muscle strength, numbness or tingling. No change in gait, balance, coordination, mood, affect, memory, mentation, behavior. · Psychiatric: No anxiety, no depression. · Endocrine: No malaise, fatigue or temperature intolerance. No excessive thirst, fluid intake, or urination. No tremor. · Hematologic/Lymphatic: No abnormal bruising or bleeding, blood clots or swollen lymph nodes. · Allergic/Immunologic: No nasal congestion or hives.     Physical Examination:    Vitals:    05/09/19 1516   BP: 128/66   Pulse: 82   Resp: 16        Constitutional and General Appearance: NAD, pleasant  Skin:good turgor,intact without lesions  HEENT: EOMI ,normal  Neck:no JVD    Respiratory:  · Normal excursion and expansion without use of accessory muscles  · Resp Auscultation: Normal breath sounds without dullness  Cardiovascular:  · The apical impulses not displaced  · Heart tones are crisp and normal  · Cervical veins are not engorged  · The carotid upstroke is normal in amplitude and contour without delay or bruit  · Normal S1S2, No S3, no murmur  · Peripheral pulses are symmetrical and full  · There is no clubbing, cyanosis of the extremities. · No edema. · Femoral Arteries: 2+ and equal  · Pedal Pulses: 2+ and equal   Abdomen:  · No masses or tenderness  · Liver/Spleen: No Abnormalities Noted  Neurological/Psychiatric:  · Alert and oriented in all spheres  · Moves all extremities well  · Exhibits normal gait balance and coordination  · No abnormalities of mood, affect, memory, mentation, or behavior are noted    ARIN 12/27/16  Summary   Mild mitral regurgitation is present.   A bubble study was performed and showed no evidence of shunting. No evidence   to support presence of a PFO    1/2016 Echo Bubble Study---  Mild concentric left ventricular hypertrophy is present. Overall left ventricular function is normal.  Ejection fraction is visually estimated to be 55-60%. There is reversal of  E/A inflow velocities across the mitral valve suggesting impaired left ventricular relaxation. Severe calcification of the posterior mitral valve annulus. A bubble study was performed and fails to show evidence of shunting. Right heart size is borderline enlarged with normal RV function. Assessment:   1. Hypertension: Blood pressure 128/66, pulse 82, resp. rate 16, height 5' 5\" (1.651 m), weight 139 lb (63 kg), not currently breastfeeding.    Stable    2. hyperlipidemia: 7/18> , TG 85, LDL 83, HDL 52 pravastatin 80mg    3. occlusion and stenosis of carotid arteries--TIA (transient ischemic attack): Event in 2014,  recurrence in June 2016. No recurrence since June. On Xarelto 20 mg.   6/17    Right 1-15%  Left 16-49%  -order carotid doppler with f/u visit    4. Factor V Leiden: Hx of DVT while on hormone therapy, on coumadin. On Xarelto 20 mg now. Dr. Paulo Shaw follows. Plan: Blanchard Councilman is stable from a cardiac standpoint. Continue all other medications  Follow up will be in 6 months with carotids same day. Thank you for allowing me to participate in the care of this individual.    Otilia Horowitz MD, [address] attestation: This note was scribed in the presence of Dr. Sahil Kolb MD, by Aaliyah Leo RN. The scribe's documentation has been prepared under my direction and personally reviewed by me in its entirety. I confirm that the note above accurately reflects all work, treatment, procedures, and medical decision making performed by me.

## 2019-08-25 DIAGNOSIS — M81.0 AGE-RELATED OSTEOPOROSIS WITHOUT CURRENT PATHOLOGICAL FRACTURE: Chronic | ICD-10-CM

## 2019-08-25 PROBLEM — E55.9 VITAMIN D DEFICIENCY: Status: ACTIVE | Noted: 2019-08-25

## 2019-08-25 PROBLEM — Z51.81 MEDICATION MONITORING ENCOUNTER: Status: ACTIVE | Noted: 2019-08-25

## 2019-08-26 RX ORDER — DENOSUMAB 60 MG/ML
INJECTION SUBCUTANEOUS
Qty: 1 SYRINGE | Refills: 0 | Status: SHIPPED | OUTPATIENT
Start: 2019-08-26 | End: 2020-02-26 | Stop reason: SDUPTHER

## 2019-09-04 ENCOUNTER — TELEPHONE (OUTPATIENT)
Dept: ENDOCRINOLOGY | Age: 79
End: 2019-09-04

## 2019-09-12 ENCOUNTER — TELEPHONE (OUTPATIENT)
Dept: ENDOCRINOLOGY | Age: 79
End: 2019-09-12

## 2019-10-17 ENCOUNTER — OFFICE VISIT (OUTPATIENT)
Dept: ENDOCRINOLOGY | Age: 79
End: 2019-10-17
Payer: MEDICARE

## 2019-10-17 ENCOUNTER — TELEPHONE (OUTPATIENT)
Dept: ENDOCRINOLOGY | Age: 79
End: 2019-10-17

## 2019-10-17 VITALS
WEIGHT: 133 LBS | BODY MASS INDEX: 22.16 KG/M2 | DIASTOLIC BLOOD PRESSURE: 68 MMHG | HEIGHT: 65 IN | HEART RATE: 83 BPM | SYSTOLIC BLOOD PRESSURE: 110 MMHG | OXYGEN SATURATION: 96 %

## 2019-10-17 DIAGNOSIS — M81.0 AGE-RELATED OSTEOPOROSIS WITHOUT CURRENT PATHOLOGICAL FRACTURE: Primary | Chronic | ICD-10-CM

## 2019-10-17 DIAGNOSIS — G45.9 TIA (TRANSIENT ISCHEMIC ATTACK): ICD-10-CM

## 2019-10-17 DIAGNOSIS — E55.9 VITAMIN D DEFICIENCY: ICD-10-CM

## 2019-10-17 PROCEDURE — G8420 CALC BMI NORM PARAMETERS: HCPCS | Performed by: INTERNAL MEDICINE

## 2019-10-17 PROCEDURE — G8484 FLU IMMUNIZE NO ADMIN: HCPCS | Performed by: INTERNAL MEDICINE

## 2019-10-17 PROCEDURE — G8427 DOCREV CUR MEDS BY ELIG CLIN: HCPCS | Performed by: INTERNAL MEDICINE

## 2019-10-17 PROCEDURE — 1090F PRES/ABSN URINE INCON ASSESS: CPT | Performed by: INTERNAL MEDICINE

## 2019-10-17 PROCEDURE — 1123F ACP DISCUSS/DSCN MKR DOCD: CPT | Performed by: INTERNAL MEDICINE

## 2019-10-17 PROCEDURE — 99214 OFFICE O/P EST MOD 30 MIN: CPT | Performed by: INTERNAL MEDICINE

## 2019-10-17 PROCEDURE — G8598 ASA/ANTIPLAT THER USED: HCPCS | Performed by: INTERNAL MEDICINE

## 2019-10-17 PROCEDURE — 4040F PNEUMOC VAC/ADMIN/RCVD: CPT | Performed by: INTERNAL MEDICINE

## 2019-10-17 PROCEDURE — G8399 PT W/DXA RESULTS DOCUMENT: HCPCS | Performed by: INTERNAL MEDICINE

## 2019-10-17 PROCEDURE — 1036F TOBACCO NON-USER: CPT | Performed by: INTERNAL MEDICINE

## 2019-11-06 ENCOUNTER — TELEPHONE (OUTPATIENT)
Dept: ENDOCRINOLOGY | Age: 79
End: 2019-11-06

## 2019-11-25 PROBLEM — I65.23 BILATERAL CAROTID ARTERY STENOSIS: Status: ACTIVE | Noted: 2019-11-25

## 2019-11-27 ENCOUNTER — OFFICE VISIT (OUTPATIENT)
Dept: CARDIOLOGY CLINIC | Age: 79
End: 2019-11-27
Payer: MEDICARE

## 2019-11-27 ENCOUNTER — HOSPITAL ENCOUNTER (OUTPATIENT)
Dept: VASCULAR LAB | Age: 79
Discharge: HOME | End: 2019-11-27
Payer: MEDICARE

## 2019-11-27 VITALS
HEART RATE: 81 BPM | OXYGEN SATURATION: 96 % | BODY MASS INDEX: 23.44 KG/M2 | HEIGHT: 63 IN | DIASTOLIC BLOOD PRESSURE: 70 MMHG | SYSTOLIC BLOOD PRESSURE: 120 MMHG | WEIGHT: 132.3 LBS

## 2019-11-27 DIAGNOSIS — I65.23 BILATERAL CAROTID ARTERY STENOSIS: ICD-10-CM

## 2019-11-27 DIAGNOSIS — I10 ESSENTIAL HYPERTENSION: Primary | ICD-10-CM

## 2019-11-27 DIAGNOSIS — I65.23 CAROTID STENOSIS, BILATERAL: ICD-10-CM

## 2019-11-27 DIAGNOSIS — E78.5 HYPERLIPIDEMIA, UNSPECIFIED HYPERLIPIDEMIA TYPE: ICD-10-CM

## 2019-11-27 PROCEDURE — 1123F ACP DISCUSS/DSCN MKR DOCD: CPT | Performed by: INTERNAL MEDICINE

## 2019-11-27 PROCEDURE — G8420 CALC BMI NORM PARAMETERS: HCPCS | Performed by: INTERNAL MEDICINE

## 2019-11-27 PROCEDURE — G8484 FLU IMMUNIZE NO ADMIN: HCPCS | Performed by: INTERNAL MEDICINE

## 2019-11-27 PROCEDURE — G8598 ASA/ANTIPLAT THER USED: HCPCS | Performed by: INTERNAL MEDICINE

## 2019-11-27 PROCEDURE — G8427 DOCREV CUR MEDS BY ELIG CLIN: HCPCS | Performed by: INTERNAL MEDICINE

## 2019-11-27 PROCEDURE — 1036F TOBACCO NON-USER: CPT | Performed by: INTERNAL MEDICINE

## 2019-11-27 PROCEDURE — 93880 EXTRACRANIAL BILAT STUDY: CPT

## 2019-11-27 PROCEDURE — G8399 PT W/DXA RESULTS DOCUMENT: HCPCS | Performed by: INTERNAL MEDICINE

## 2019-11-27 PROCEDURE — 99214 OFFICE O/P EST MOD 30 MIN: CPT | Performed by: INTERNAL MEDICINE

## 2019-11-27 PROCEDURE — 4040F PNEUMOC VAC/ADMIN/RCVD: CPT | Performed by: INTERNAL MEDICINE

## 2019-11-27 PROCEDURE — 1090F PRES/ABSN URINE INCON ASSESS: CPT | Performed by: INTERNAL MEDICINE

## 2019-12-02 ENCOUNTER — TELEPHONE (OUTPATIENT)
Dept: CARDIOLOGY CLINIC | Age: 79
End: 2019-12-02

## 2020-02-24 NOTE — TELEPHONE ENCOUNTER
PTs Son Ha Malone is requesting a prescription for his mothers prolia injection to be sent to [facility] in [facility].

## 2020-02-27 RX ORDER — DENOSUMAB 60 MG/ML
INJECTION SUBCUTANEOUS
Qty: 1 SYRINGE | Refills: 1 | Status: SHIPPED | OUTPATIENT
Start: 2020-02-27 | End: 2020-09-04 | Stop reason: SDUPTHER

## 2020-03-20 NOTE — TELEPHONE ENCOUNTER
I called patients son and he informed me that patient had her prolia injection today at her pcp office.

## 2020-05-19 NOTE — PROGRESS NOTES
Allergic, Anxiety, Arthritis, C. difficile colitis, Depression, DVT (deep venous thrombosis) ([facility] [address] Fracture, sacrum/coccyx ([facility] [address] H/O migraine, Hx of blood clots, Hyperlipidemia, Movement disorder, Unspecified cerebral artery occlusion with cerebral infarction, Unspecified diseases of blood and blood-forming organs, and Vitamin D deficiency. Surgical History:   has a past surgical history that includes Carpal tunnel release; Knee arthroscopy (Bilateral); Colonoscopy; Tonsillectomy; other surgical history (Right); hip surgery (Right, 4/21/15.); fracture surgery (01/08/2018); and Cosmetic surgery. Social History:   reports that she has never smoked. She has never used smokeless tobacco. She reports that she does not drink alcohol or use drugs. Family History:  family history includes Arthritis in her mother; Asthma in her father; Depression in her mother; Heart Disease in her brother, father, and sister; Stroke in her mother. Home medications:    Current Outpatient Medications:     PROLIA 60 MG/ML SOSY SC injection, INJECT ONE MILLILITER SUBCUTANEOUSLY EVERY 6 MONTHS, Disp: 1 Syringe, Rfl: 1    Atorvastatin Calcium (LIPITOR PO), Take 40 mg by mouth daily , Disp: , Rfl:     melatonin 3 MG TABS tablet, Take 3 mg by mouth daily, Disp: , Rfl:     calcitRIOL (ROCALTROL) 0.25 MCG capsule, Take 1 capsule by mouth daily, Disp: 90 capsule, Rfl: 0    Coenzyme Q10 (COQ10 PO), Take by mouth, Disp: , Rfl:     Probiotic Product (PROBIOTIC-10) CAPS, Take by mouth daily, Disp: , Rfl:     Calcium Carb-Cholecalciferol (CALCIUM-VITAMIN D) 500-200 MG-UNIT per tablet, Take 1 tablet by mouth , Disp: , Rfl:     traZODone (DESYREL) 50 MG tablet, Take 100 mg by mouth nightly , Disp: , Rfl:     Multiple Vitamins-Minerals (THERAPEUTIC MULTIVITAMIN-MINERALS) tablet, Take 1 tablet by mouth daily, Disp: , Rfl:     Omega-3 Fatty Acids (FISH OIL) 1200 MG CAPS, Take 1 tablet by mouth daily. , Disp: , Rfl:     vitamin D (CHOLECALCIFEROL) 400 UNIT TABS tablet, Take 2,000 Units by mouth daily , Disp: , Rfl:         Allergies:  Flagyl [metronidazole]; Omnicef; Cephalexin; and Penicillins     Review of Systems:   · Constitutional: there has been no unanticipated weight loss. There's been no change in energy level, sleep pattern, or activity level. · Eyes: No visual changes or diplopia. No scleral icterus. · ENT: No Headaches, hearing loss or vertigo. No mouth sores or sore throat. · Cardiovascular: Reviewed in HPI  · Respiratory: No cough or wheezing, no sputum production. No hematemesis. · Gastrointestinal: No abdominal pain, appetite loss, blood in stools. No change in bowel or bladder habits. · Genitourinary: No dysuria, trouble voiding, or hematuria. · Musculoskeletal:  No gait disturbance, weakness or joint complaints. · Integumentary: No rash or pruritis. · Neurological: No headache, diplopia, change in muscle strength, numbness or tingling. No change in gait, balance, coordination, mood, affect, memory, mentation, behavior. · Psychiatric: No anxiety, no depression. · Endocrine: No malaise, fatigue or temperature intolerance. No excessive thirst, fluid intake, or urination. No tremor. · Hematologic/Lymphatic: No abnormal bruising or bleeding, blood clots or swollen lymph nodes. · Allergic/Immunologic: No nasal congestion or hives.     Physical Examination:    Vitals:    05/22/20 1359   BP: 122/80   Pulse: 76        Constitutional and General Appearance: NAD, pleasant  Skin:good turgor,intact without lesions  HEENT: EOMI ,normal  Neck:no JVD    Respiratory:  · Normal excursion and expansion without use of accessory muscles  · Resp Auscultation: Normal breath sounds without dullness  Cardiovascular:  · The apical impulses not displaced  · Heart tones are crisp and normal  · Cervical veins are not engorged  · The carotid upstroke is normal in amplitude and contour without delay or bruit  · Normal S1S2, No S3, no

## 2020-05-22 ENCOUNTER — OFFICE VISIT (OUTPATIENT)
Dept: CARDIOLOGY CLINIC | Age: 80
End: 2020-05-22
Payer: MEDICARE

## 2020-05-22 VITALS
BODY MASS INDEX: 22.88 KG/M2 | WEIGHT: 129.12 LBS | HEIGHT: 63 IN | DIASTOLIC BLOOD PRESSURE: 80 MMHG | SYSTOLIC BLOOD PRESSURE: 122 MMHG | HEART RATE: 76 BPM

## 2020-05-22 PROCEDURE — G8427 DOCREV CUR MEDS BY ELIG CLIN: HCPCS | Performed by: INTERNAL MEDICINE

## 2020-05-22 PROCEDURE — G8399 PT W/DXA RESULTS DOCUMENT: HCPCS | Performed by: INTERNAL MEDICINE

## 2020-05-22 PROCEDURE — 4040F PNEUMOC VAC/ADMIN/RCVD: CPT | Performed by: INTERNAL MEDICINE

## 2020-05-22 PROCEDURE — 1036F TOBACCO NON-USER: CPT | Performed by: INTERNAL MEDICINE

## 2020-05-22 PROCEDURE — 1123F ACP DISCUSS/DSCN MKR DOCD: CPT | Performed by: INTERNAL MEDICINE

## 2020-05-22 PROCEDURE — 99214 OFFICE O/P EST MOD 30 MIN: CPT | Performed by: INTERNAL MEDICINE

## 2020-05-22 PROCEDURE — 1090F PRES/ABSN URINE INCON ASSESS: CPT | Performed by: INTERNAL MEDICINE

## 2020-05-22 PROCEDURE — G8420 CALC BMI NORM PARAMETERS: HCPCS | Performed by: INTERNAL MEDICINE

## 2020-09-04 RX ORDER — DENOSUMAB 60 MG/ML
INJECTION SUBCUTANEOUS
Qty: 1 SYRINGE | Refills: 1 | Status: SHIPPED | OUTPATIENT
Start: 2020-09-04 | End: 2021-03-02 | Stop reason: SDUPTHER

## 2020-09-04 NOTE — TELEPHONE ENCOUNTER
Pt's son Bright Adjutant calling and pt needs refill for Prolia sent to [facility] on [address] in [facility].  Pt will be due for Prolia end of Sept and she goes to her family  to get her Prolia     LOV     10-17-19  FOV    10-15-20

## 2020-10-15 ENCOUNTER — VIRTUAL VISIT (OUTPATIENT)
Dept: ENDOCRINOLOGY | Age: 80
End: 2020-10-15
Payer: MEDICARE

## 2020-10-15 PROCEDURE — 99443 PR PHYS/QHP TELEPHONE EVALUATION 21-30 MIN: CPT | Performed by: INTERNAL MEDICINE

## 2020-10-15 NOTE — PROGRESS NOTES
HISTORY OF PRESENT ILLNESS:   Patient is seen for osteoporosis diagnosed many years ago. The earliest DXA showing  osteoporosis is from 2013. She was taking alendronate . Patient is seen here for osteoporosis her most recent blood work that I can review is from June 2020 which shows a creatinine of 1.2 and a serum calcium level of 9.7  Last Prolia injection was in sept 2020 she denies any changes in her symptoms   According to her son , some of her medication were changed due to her Creatinine being elevated   Son Rehan Luna has power of  [phone], [email or web address]  She denies any fracture       Past medical history in regards to osteoporosis  Osteoporosis 2009 or before, DXA 10/2013, lowest T-score -3.1 in the 33% radius    Family history of osteoporosis, mother, sister    BMD decreased 4595-1462, 7640-9632, 6032-4789 and 7201-2476, 1863-8904, lowest T-score -3.0 LFN    04/2015 R intertrochanteric femur fracture    01/2018 R tri-malleolar ankle fx    06/2018 sacral fracture  Natural menopause age uncertain      Factor V Leiden she reportedly had TIAs due to this and follows with the cardiologist.  Patient and her son feels that her memory has improved in the last few months. C diff after fracture repair  TIAs  Anxiety    --- MANAGEMENT FOR OSTEOPOROSIS. Calcium, 300 mg from low calcium foods,  450 mg almond milk, 300 mg yogurt, 300 mg fortified OJ   diet MVI Ca+D other total    Calcium 1350  600   mg/d   Vitamin D   800   IU/d     25-OH D 71 ng/mL 06/2017 (desirable is 30-60 ng/mL)  Exercise, 2 x weekly with   Pharmacologic therapy: Fosamax weekly started 2009 and stopped in sept 2019     Clarke Anthony.   Fosamax for 10+ years    Current Outpatient Medications on File Prior to Visit   Medication Sig Dispense Refill    PROLIA 60 MG/ML SOSY SC injection INJECT ONE MILLILITER SUBCUTANEOUSLY EVERY 6 MONTHS 1 Syringe 1    apixaban (ELIQUIS) 2.5 MG TABS tablet changes in facial body hair denies any non healing wounds Musculoskeletal   denies any joint or bone pain ,denies any muscle weakness ,denies any new fracture  Endocrine  Denies any excessive thirst denies any excessive heat intolerance or cold intolerance . exaM   Patient is  alert oriented to time ,place and person. Both recent and remote memory intact. Patient has weighed themselves in the last few  weeks and it has been stable. BONE DENSITY. Most recent done 9/2018 sing [employer] equipment. The 2013 study was done at [facility].    T-scores  Initial study: 10/27/2015* L2-L4 -1.7 left fem. neck -2.4   Current study: 09/06/2018 L2-L4 -1.9 left fem. neck -3.0     The table below shows bone mineral density (grams/cm2), the appropriate measure for comparing serial scans. A significant increase or decrease is based on precision studies done according to the ISCD protocol with a least significant change of 0.030 g/cm2. PA spine Proximal Femur (left)   Date L2-L4 Fem. neck Trochanter Total hip   09/15/2004* --- 0.769 --- 0.810   09/18/2007* --- 0.784 --- 0.816   06/22/2010* --- 0.726 --- 0.747   09/04/2012* --- 0.741 --- 0.750   10/18/2013* --- 0.605 --- 0.703   10/27/2015* 0.889 0.573 --- 0.668   11/22/2016* 0.866 0.543 --- 0.673   09/06/2018 0.871 0.512 0.514 0.629   * Done with [employer] equipment. BMD converted to Hologic units. EXAMINATION:  DXA BONE DENSITY WITHOUT FRAX    CLINICAL HISTORY: Age-related osteoporosis without current pathological fracture    COMPARISON: 11/22/2016    TECHNIQUE:  Equipment: [employer] DPX, [employer] version 16    FINDINGS:     SCAN SITE    SPINE  Actual BMD (gm/cm2) =  1.080         Previous 1.031  % Change 4.8Significant to a confidence level > 95% yes  T-Score (S.D.  From peak bone mass)= -0.8    RIGHT HIP - N/A    LEFT HIP  Actual BMD (gm/cm2) = 0.714         Previous 0.674  % Change 2.4 Significant to a confidence level > 95% no  T-Score (S.D. From peak bone mass)= -2.3    LEFT FEMORAL NECK  Actual BMD (gm/cm2)= 0.690  T-Score (S.D. from peak bone mass)= -2.5    FOREARM-RADIUS 33%  Actual BMD (gm/cm2) = 0.483           Previous 0.461  % Change 4.5 Significant to a confidence level > 95% no  T-Score (S.D. From peak bone mass)= -3.2   Osteoporosis, BMD lower than desirable. BMD decreased in the femoral neck 8219-7223, 7627-7466, 1115-0145 and 1685-9896 and in the total hip 6137-3243, 6925-5568, 9139-4614 and 2704-4338. ASSESSMENT and plan. Osteoporosis, bone density lower than desirable. Despite treatment with Fosamax since ~2009, BMD  decreased and she has had multiple fractures. She was switched to Prolia   She has been taking Prolia without any side effects   ---last DEXA scan was in September 2019  I have given her orders for repeat DEXA scan to be done now. She will also get a vitamin D kidney function test drawn now  Previously I had ordered vitamin D PTH levels which were not done  Patient is currently taking 1000 IUs of vitamin D supplementation she was advised to double it up  Prolia was started in September 2018. We discussed efficacy, safety and side effects. We will make the necessary arrangements. [place] and would like to have Prolia shot given in a facility close to her home, she has been getting her Prolia injections at her primary care office .   I had a lengthy discussion with the son today that since my office is not administering her Prolia injection they would have to make sure that she continues to get this injection every 6 months as not taking the medication on its time can lead to further damage to bone they verbalized understanding and will assure that she takes Prolia every 6 months  --- I reviewed the results of DEXA scan done on September 2019 at [facility]  with her in detail which shows significant improvement in bone mineral density she was advised to continue taking the medications along with optimizing calcium and vitamin D supplementation    Hyperlipidemia patient is on Lipitor    Vitamin D deficiency she is on Vitamin D supplementation  1000 iu daily she will increase it to 2000 IUs daily      Elevated creatinine since September 2019  EGFR is still above 40 patient and her son was advised that if creatinine worsens we might have to adjust her therapy for osteoporosis as well. They were advised to call us back if there is significant worsening in the kidney functions noted. According to the son elevated creatinine is considered secondary to her being dehydrated and patient is already working on improving her hydration    History of TIA she is on Eliquis      This was a phone conversation in [place] of in-person visit due to coronavirus emergency. Patient provided verbal consent for an \"over the phone visit'. Location of patient; home  Total time spent 24V minutes on this call conducting an interview, collecting data and reviewing her chart, performing a limited exam by phone and educating the patient on my assessment and plan.

## 2020-11-06 ENCOUNTER — TELEPHONE (OUTPATIENT)
Dept: ENDOCRINOLOGY | Age: 80
End: 2020-11-06

## 2020-11-09 RX ORDER — APIXABAN 2.5 MG/1
TABLET, FILM COATED ORAL
Qty: 180 TABLET | Refills: 3 | Status: SHIPPED | OUTPATIENT
Start: 2020-11-09 | End: 2020-11-25

## 2020-11-09 NOTE — TELEPHONE ENCOUNTER
Medication Refill    Medication needing refilled:  apixaban (ELIQUIS) 2.5 MG TABS tablet    Dosage of the medication: 2.5 mg    How are you taking this medication (QD, BID, TID, QID, PRN): 1 tablet 2 x daily    30 or 90 day supply called in: 80    Which Pharmacy are we sending the medication to?:    [facility] [address] [place] - [place] [phone] - F [phone]

## 2020-11-09 NOTE — TELEPHONE ENCOUNTER
RX APPROVAL:      Refill:   Requested Prescriptions     Pending Prescriptions Disp Refills    ELIQUIS 2.5 MG TABS tablet [Pharmacy Med Name: ELIQUIS 2.5 MG TABLET] 180 tablet 0     Sig: TAKE ONE TABLET BY MOUTH TWICE A DAY      Last OV: 5/22/2020   Last EKG:   Last Labs:   Lab Results   Component Value Date    GLUCOSE 100 07/25/2018    BUN 18 07/25/2018    CREATININE 0.9 07/25/2018    LABGLOM >60 07/25/2018     07/25/2018    K 4.1 07/25/2018     07/25/2018    CO2 23 07/25/2018    CALCIUM 9.0 07/25/2018     Lab Results   Component Value Date     07/25/2018     07/25/2018    CO2 23 07/25/2018    ANIONGAP 12 07/25/2018    GLUCOSE 100 07/25/2018    BUN 18 07/25/2018    CREATININE 0.9 07/25/2018    LABGLOM >60 07/25/2018    GFRAA >60 07/25/2018    CALCIUM 9.0 07/25/2018    PROT 7.1 07/24/2018    LABALBU 4.0 07/24/2018    BILITOT 0.3 07/24/2018    ALKPHOS 91 07/24/2018    AST 32 07/24/2018    ALT 22 07/24/2018    GLOB 3.1 07/24/2018     Lab Results   Component Value Date    ALT 22 07/24/2018    AST 32 07/24/2018     Lab Results   Component Value Date    K 4.1 07/25/2018       Plan and labs reviewed

## 2020-11-13 ENCOUNTER — TELEPHONE (OUTPATIENT)
Dept: CARDIOLOGY CLINIC | Age: 80
End: 2020-11-13

## 2020-11-13 NOTE — TELEPHONE ENCOUNTER
Pt son Christiano Tate calling about pts appt next week does LES want to do a VV or see pt in person? They are ok with either one he will be in town to help her either way.  Pls call to advise Thank you

## 2020-11-25 ENCOUNTER — VIRTUAL VISIT (OUTPATIENT)
Dept: CARDIOLOGY CLINIC | Age: 80
End: 2020-11-25
Payer: MEDICARE

## 2020-11-25 PROCEDURE — G8427 DOCREV CUR MEDS BY ELIG CLIN: HCPCS | Performed by: NURSE PRACTITIONER

## 2020-11-25 PROCEDURE — 99214 OFFICE O/P EST MOD 30 MIN: CPT | Performed by: NURSE PRACTITIONER

## 2020-11-25 PROCEDURE — 1123F ACP DISCUSS/DSCN MKR DOCD: CPT | Performed by: NURSE PRACTITIONER

## 2020-11-25 PROCEDURE — G8399 PT W/DXA RESULTS DOCUMENT: HCPCS | Performed by: NURSE PRACTITIONER

## 2020-11-25 PROCEDURE — 4040F PNEUMOC VAC/ADMIN/RCVD: CPT | Performed by: NURSE PRACTITIONER

## 2020-11-25 PROCEDURE — 1090F PRES/ABSN URINE INCON ASSESS: CPT | Performed by: NURSE PRACTITIONER

## 2020-11-25 NOTE — PROGRESS NOTES
2020     PMH / HPI: HTN, hyperlipidemia and carotid stenosis. 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 Carter Pod      Chief Complaint   Patient presents with    Hypertension     No cardiac complaints    Hyperlipidemia       TELEHEALTH EVALUATION -- Audio/Visual (During IVJME-83 public health emergency)    Grecia Augustin (:  1940) has requested an audio/video evaluation for the following concern(s): med changes with DOAC after change in renal function       Prior to Visit Medications    Medication Sig Taking? Authorizing Provider   apixaban (ELIQUIS) 2.5 MG TABS tablet Take 1 tablet by mouth 2 times daily Yes Tucker Ferrara MD   PROLIA 60 MG/ML SOSY SC injection INJECT ONE MILLILITER SUBCUTANEOUSLY EVERY 6 MONTHS Yes Yoahn Corcoran MD   Atorvastatin Calcium (LIPITOR PO) Take 40 mg by mouth daily  Yes Historical Provider, MD   melatonin 3 MG TABS tablet Take 3 mg by mouth daily Yes Historical Provider, MD   calcitRIOL (ROCALTROL) 0.25 MCG capsule Take 1 capsule by mouth daily Yes Yohan Corcoran MD   Coenzyme Q10 (COQ10 PO) Take by mouth Yes Historical Provider, MD   Probiotic Product (PROBIOTIC-10) CAPS Take by mouth daily Yes Historical Provider, MD   Calcium Carb-Cholecalciferol (CALCIUM-VITAMIN D) 500-200 MG-UNIT per tablet Take 1 tablet by mouth  Yes Historical Provider, MD   traZODone (DESYREL) 50 MG tablet Take 100 mg by mouth nightly  Yes Historical Provider, MD   Multiple Vitamins-Minerals (THERAPEUTIC MULTIVITAMIN-MINERALS) tablet Take 1 tablet by mouth daily Yes Historical Provider, MD   Omega-3 Fatty Acids (FISH OIL) 1200 MG CAPS Take 1 tablet by mouth daily.  Yes Historical Provider, MD   vitamin D (CHOLECALCIFEROL) 400 UNIT TABS tablet Take 2,000 Units by mouth 2 times daily  Yes Historical Provider, MD       Social History     Tobacco Use    Smoking status: Never Smoker    Smokeless tobacco: Never Used   Substance Use Topics    Alcohol use: No    Drug use: No        REVIEW OF SYSTEMS:    CONSTITUTIONAL: No major weight gain or loss, fatigue, weakness, night sweats or fever. HEENT: No new vision difficulties or ringing in the ears. RESPIRATORY: No new SOB, PND, orthopnea or cough. CARDIOVASCULAR: See HPI  GI: No nausea, vomiting, diarrhea, constipation, abdominal pain or changes in bowel habits. : No urinary frequency, urgency, incontinence hematuria or dysuria. SKIN: No cyanosis or skin lesions. MUSCULOSKELETAL: No new muscle or joint pain. NEUROLOGICAL: No syncope or TIA-like symptoms. PSYCHIATRIC: No anxiety, pain, insomnia or depression      Subjective:   she denies significant chest pain. There is no SOB/CHOUDHURY. The patient denies orthopnea/PND. The patient does not have swelling. The patients weight is stable . The patient is not experiencing palpitations or dizziness. These symptoms show no change since the last OV. With regard to medication therapy the patient has been compliant with prescribed regimen. They have tolerated therapy to date. Allergies   Allergen Reactions    Flagyl [Metronidazole] Diarrhea     Pt son said, Emani Rosas was given flagyl in feb 2018\". He would like this documented in her chart.      Omnicef Diarrhea    Cephalexin Diarrhea    Penicillins Hives       PHYSICAL EXAMINATION:  [ INSTRUCTIONS:  \"[x]\" Indicates a positive item  \"[]\" Indicates a negative item  -- DELETE ALL ITEMS NOT EXAMINED]  Vital Signs: (As obtained by patient/caregiver or practitioner observation)      Patient-Reported Vitals 11/25/2020   Patient-Reported Weight 125lb   Patient-Reported Height 5'3        Constitutional: [x] Appears well-developed and well-nourished [x] No apparent distress      [] Abnormal-   Mental status  [x] Alert and awake  [x] Oriented to person/place/time [x]Able to follow commands      Eyes:  EOM    [x]  Normal  [] Abnormal-  Sclera  [x]  Normal  [] Abnormal -         Discharge [x]  None visible  [] Abnormal -    HENT:   [x] Normocephalic, atraumatic. [] Abnormal   [] Mouth/Throat: Mucous membranes are moist.     External Ears [x] Normal  [] Abnormal-     Neck: [x] No visualized mass     Pulmonary/Chest: [x] Respiratory effort normal.  [x] No visualized signs of difficulty breathing or respiratory distress        [] Abnormal-      Musculoskeletal:   [] Normal gait with no signs of ataxia         [x] Normal range of motion of neck        [] Abnormal-       Neurological:        [x] No Facial Asymmetry (Cranial nerve 7 motor function) (limited exam to video visit)          [] No gaze palsy        [] Abnormal-         Skin:        [x] No significant exanthematous lesions or discoloration noted on facial skin         [] Abnormal-            Psychiatric:       [x] Normal Affect [] No Hallucinations        [] Abnormal-     Other pertinent observable physical exam findings-none    ASSESSMENT/PLAN:  1. Medication changed to therapeutic equivalent on formulary  ~Xarelto to Eliquis : tolerating without problems  ~creatinine improved to 1.18 from 1.28 this spring ; GFR 43 from 39 ; has since increased her fluid intake    2. HTN (hypertension), benign  ~has no means to check at home     3. Bilateral carotid artery stenosis  ~mazin ICA < 50%  ~offers no c/o recurrent TIA symptoms  ~atorvastatin     4. Mixed hyperlipidemia  ~atorvastatin   ~well controlled :  trig 100 HDL 62 LDL 74 from profile April '20      Return in about 6 months (around 5/25/2021). Juan Luis Brownlee is a 78 y.o. female being evaluated by a Virtual Visit (video visit) encounter to address concerns as mentioned above. A caregiver was present when appropriate. Due to this being a TeleHealth encounter (During CRMWE-77 public health emergency), evaluation of the following organ systems was limited: Vitals/Constitutional/EENT/Resp/CV/GI//MS/Neuro/Skin/Heme-Lymph-Imm.   Pursuant to the emergency declaration under the [address] Emergencies Act, 1135 waiver authority and the Coronavirus Preparedness and Response Supplemental Appropriations Act, this Virtual Visit was conducted with patient's (and/or legal guardian's) consent, to reduce the patient's risk of exposure to COVID-19 and provide necessary medical care. The patient (and/or legal guardian) has also been advised to contact this office for worsening conditions or problems, and seek emergency medical treatment and/or call 911 if deemed necessary. Services were provided through a video synchronous discussion virtually to substitute for in-person clinic visit. Patient and provider were located at their individual homes. --MARBELLA Stack - CNP on 11/25/2020 at 12:17 PM    An electronic signature was used to authenticate this note.        Objective:     DATA:    Lab Results   Component Value Date    ALT 22 07/24/2018    AST 32 07/24/2018    ALKPHOS 91 07/24/2018    BILITOT 0.3 07/24/2018     Lab Results   Component Value Date    CREATININE 0.9 07/25/2018    BUN 18 07/25/2018     07/25/2018    K 4.1 07/25/2018     07/25/2018    CO2 23 07/25/2018     No results found for: TSH, T4VUUOT, R4IGTVD, THYROIDAB  Lab Results   Component Value Date    WBC 7.4 07/25/2018    HGB 12.4 07/25/2018    HCT 37.7 07/25/2018    MCV 94.6 07/25/2018     07/25/2018     No components found for: CHLPL  Lab Results   Component Value Date    TRIG 85 07/25/2018    TRIG 86 10/31/2014     Lab Results   Component Value Date    HDL 52 07/25/2018    HDL 58 10/31/2014     Lab Results   Component Value Date    LDLCALC 83 07/25/2018    [address] 93 10/31/2014     Lab Results   Component Value Date    LABVLDL 17 07/25/2018    LABVLDL 17 10/31/2014       Radiology Review:  Pertinent images / reports were reviewed as a part of this visit and reveals the following:    Carotid US: Nov '19:  Summary          There is <50% stenosis of the bilateral internal carotid arteries.     Vertebral flow are antegrade bilaterally       Bubble echo : Dec '16:   Summary   Mild mitral regurgitation is present. A bubble study was performed and showed no evidence of shunting. No evidence   to support presence of a PFO    Echo : Jan '16:  Summary   -Mild concentric left ventricular hypertrophy is present.   -Overall left ventricular function is normal.   -Ejection fraction is visually estimated to be 55-60%. There is reversal of   E/A inflow velocities across the mitral valve suggesting impaired left   ventricular relaxation.   -Severe calcification of the posterior mitral valve annulus.   -A bubble study was performed and fails to show evidence of shunting.   -Right heart size is borderline enlarged with normal RV function. I had the opportunity to review the clinical symptoms and presentation of [address] [place] [address]. Plan:     1. Continue present management   2. F/U in six months    Overall the patient is stable from CV standpoint    I have addresed the patient's cardiac risk factors and adjusted pharmacologic treatment as needed. In addition, I have reinforced the need for patient directed risk factor modification. Further evaluation will be based upon the patient's clinical course and testing results. All questions and concerns were addressed to the patient/son, Sheyla Lopez. Alternatives to my treatment were discussed. The patient is not currently smoking. The risks related to smoking were reviewed with the patient. Recommend maintaining a smoke-free lifestyle. Patient is not on a beta-blocker : neg MI  Patient is not on an ace-i/ARB : neg CHF  Patient is on a statin     anti-coagulation has been recommended / prescribed for this patient. Education conducted on adverse reactions including bleeding was discussed. The patient verbalizes understanding not to stop medications without discussing with us.     Discussed exercise: 30-60 minutes 7 days/week : walks ~ 3/4 mile daily ; stays envolved in Spiritism activiites and at the senior center. She has gotten a peddler to use when sitting in her chair ~ 30 minutes (however, her knees aren't the greatest)    Discussed Low saturated fat diet. Thank you for allowing to us to participate in the care of [address] [place] [address].     MARBELLA Bhatt    Documentation of today's visit sent to PCP

## 2021-03-01 ENCOUNTER — TELEPHONE (OUTPATIENT)
Dept: ENDOCRINOLOGY | Age: 81
End: 2021-03-01

## 2021-03-01 DIAGNOSIS — M81.0 AGE-RELATED OSTEOPOROSIS WITHOUT CURRENT PATHOLOGICAL FRACTURE: Chronic | ICD-10-CM

## 2021-03-01 NOTE — TELEPHONE ENCOUNTER
PT needs to have the Prolia 60 mg/ml sosy sc injection shot called into ho at [phone]. She will pick the shot up and take it to her PCP and they will give it to her on March 19th.

## 2021-03-02 RX ORDER — DENOSUMAB 60 MG/ML
INJECTION SUBCUTANEOUS
Qty: 1 SYRINGE | Refills: 1 | Status: SHIPPED | OUTPATIENT
Start: 2021-03-02 | End: 2021-09-01 | Stop reason: SDUPTHER

## 2021-03-02 NOTE — TELEPHONE ENCOUNTER
Requested Prescriptions     Pending Prescriptions Disp Refills    PROLIA 60 MG/ML SOSY SC injection 1 Syringe 1     Sig: INJECT ONE MILLILITER SUBCUTANEOUSLY EVERY 6 MONTHS       Last Prolia on 9/21. LMOM for son to inform him that patient's March 19th appointment will be too early to get the Prolia shot.

## 2021-05-13 ENCOUNTER — OFFICE VISIT (OUTPATIENT)
Dept: CARDIOLOGY CLINIC | Age: 81
End: 2021-05-13
Payer: MEDICARE

## 2021-05-13 VITALS
HEART RATE: 70 BPM | RESPIRATION RATE: 18 BRPM | BODY MASS INDEX: 21.97 KG/M2 | HEIGHT: 63 IN | OXYGEN SATURATION: 96 % | WEIGHT: 124 LBS | SYSTOLIC BLOOD PRESSURE: 126 MMHG | DIASTOLIC BLOOD PRESSURE: 64 MMHG

## 2021-05-13 DIAGNOSIS — E78.2 MIXED HYPERLIPIDEMIA: ICD-10-CM

## 2021-05-13 DIAGNOSIS — I65.23 BILATERAL CAROTID ARTERY STENOSIS: ICD-10-CM

## 2021-05-13 DIAGNOSIS — I10 HTN (HYPERTENSION), BENIGN: Primary | ICD-10-CM

## 2021-05-13 DIAGNOSIS — D68.51 FACTOR V LEIDEN (HCC): ICD-10-CM

## 2021-05-13 PROCEDURE — G8427 DOCREV CUR MEDS BY ELIG CLIN: HCPCS | Performed by: INTERNAL MEDICINE

## 2021-05-13 PROCEDURE — 1123F ACP DISCUSS/DSCN MKR DOCD: CPT | Performed by: INTERNAL MEDICINE

## 2021-05-13 PROCEDURE — 99214 OFFICE O/P EST MOD 30 MIN: CPT | Performed by: INTERNAL MEDICINE

## 2021-05-13 PROCEDURE — 4040F PNEUMOC VAC/ADMIN/RCVD: CPT | Performed by: INTERNAL MEDICINE

## 2021-05-13 PROCEDURE — G8399 PT W/DXA RESULTS DOCUMENT: HCPCS | Performed by: INTERNAL MEDICINE

## 2021-05-13 PROCEDURE — G8420 CALC BMI NORM PARAMETERS: HCPCS | Performed by: INTERNAL MEDICINE

## 2021-05-13 PROCEDURE — 1090F PRES/ABSN URINE INCON ASSESS: CPT | Performed by: INTERNAL MEDICINE

## 2021-05-13 PROCEDURE — 1036F TOBACCO NON-USER: CPT | Performed by: INTERNAL MEDICINE

## 2021-05-13 NOTE — PROGRESS NOTES
MG CAPS, Take 1 tablet by mouth daily. , Disp: , Rfl:     vitamin D (CHOLECALCIFEROL) 400 UNIT TABS tablet, Take 2,000 Units by mouth 2 times daily , Disp: , Rfl:         Allergies:  Flagyl [metronidazole], Omnicef, Cephalexin, and Penicillins     Review of Systems:   · Constitutional: there has been no unanticipated weight loss. There's been no change in energy level, sleep pattern, or activity level. · Eyes: No visual changes or diplopia. No scleral icterus. · ENT: No Headaches, hearing loss or vertigo. No mouth sores or sore throat. · Cardiovascular: Reviewed in HPI  · Respiratory: No cough or wheezing, no sputum production. No hematemesis. · Gastrointestinal: No abdominal pain, appetite loss, blood in stools. No change in bowel or bladder habits. · Genitourinary: No dysuria, trouble voiding, or hematuria. · Musculoskeletal:  No gait disturbance, weakness or joint complaints. · Integumentary: No rash or pruritis. · Neurological: No headache, diplopia, change in muscle strength, numbness or tingling. No change in gait, balance, coordination, mood, affect, memory, mentation, behavior. · Psychiatric: No anxiety, no depression. · Endocrine: No malaise, fatigue or temperature intolerance. No excessive thirst, fluid intake, or urination. No tremor. · Hematologic/Lymphatic: No abnormal bruising or bleeding, blood clots or swollen lymph nodes. · Allergic/Immunologic: No nasal congestion or hives.     Physical Examination:    Vitals:    05/13/21 1428   BP: 126/64   Pulse: 70   Resp: 18   SpO2: 96%        Constitutional and General Appearance: NAD, pleasant  Skin:good turgor,intact without lesions  HEENT: EOMI ,normal  Neck:no JVD    Respiratory:  · Normal excursion and expansion without use of accessory muscles  · Resp Auscultation: Normal breath sounds without dullness  Cardiovascular:  · The apical impulses not displaced  · Heart tones are crisp and normal  · Cervical veins are not engorged  · The carotid upstroke is normal in amplitude and contour without delay or bruit  · Normal S1S2, No S3, no murmur  · Peripheral pulses are symmetrical and full  · There is no clubbing, cyanosis of the extremities. · No edema. · Femoral Arteries: 2+ and equal  · Pedal Pulses: 2+ and equal   Abdomen:  · No masses or tenderness  · Liver/Spleen: No Abnormalities Noted  Neurological/Psychiatric:  · Alert and oriented in all spheres  · Moves all extremities well  · Exhibits normal gait balance and coordination  · No abnormalities of mood, affect, memory, mentation, or behavior are noted    ARIN 12/27/16  Summary   Mild mitral regurgitation is present.   A bubble study was performed and showed no evidence of shunting. No evidence   to support presence of a PFO    1/2016 Echo Bubble Study---  Mild concentric left ventricular hypertrophy is present. Overall left ventricular function is normal.  Ejection fraction is visually estimated to be 55-60%. There is reversal of  E/A inflow velocities across the mitral valve suggesting impaired left ventricular relaxation. Severe calcification of the posterior mitral valve annulus. A bubble study was performed and fails to show evidence of shunting. Right heart size is borderline enlarged with normal RV function. Assessment:   1. Hypertension: Blood pressure 126/64, pulse 70, resp. rate 18, height 5' 3\" (1.6 m), weight 124 lb (56.2 kg), SpO2 96 %, not currently breastfeeding. Stable    2. Hyperlipidemia: Controlled -Lipitor 40mg    3. Occlusion and stenosis of carotid arteries--TIA (transient ischemic attack): Event in 2014,  recurrence in June 2016. No recurrence since June. On Xarelto 20 mg.   6/17    Right 1-15%  Left 16-49%  Dopplers 11/27/19> <50% stenosis of the bilateral internal carotid arteries    4. Factor V Leiden: Hx of DVT while on hormone therapy, on coumadin. Dr. Geronimo Hernandez follows. Stopped Xarelto and changed to  Eliquis 2.5mg  Bid due to change in Kidney function.  - Kidney function much improved. Plan:   Mahamed Wallace has a stable cardiac status. Cardiac test and lab results personally reviewed by me during this office visit and discussed. No medication changes. Continue risk factor modifications- continue to exercise daily. Call for any change in symptoms. Return for regular follow up in 6 months. Thank you for allowing me to participate in the care of this individual.    Ministerio Momin MD, [facility]    Patient's problem list, medications, allergies, past medical, surgical, social and family histories were reviewed and updated as appropriate. Scribe's attestation: This note was scribed in the presence of Dr. Elizabeth Frye MD, by Reema Arredondo RN. The scribe's documentation has been prepared under my direction and personally reviewed by me in its entirety. I confirm that the note above accurately reflects all work, treatment, procedures, and medical decision making performed by me.

## 2021-07-06 ENCOUNTER — TELEPHONE (OUTPATIENT)
Dept: CARDIOLOGY CLINIC | Age: 81
End: 2021-07-06

## 2021-07-06 NOTE — TELEPHONE ENCOUNTER
Pt son Carrie Melo called on answering service stating that pt had an episode that caused her to be on conscious he called 911 and they took pt to [facility]. Carrie Melo wants to know if LES wants pt transferred to [facility]?  Pls call to advise Thank you

## 2021-07-06 NOTE — PROGRESS NOTES
Cardiac Follow Up    Referring Provider:  Fer Fisher     Cc- f/up for remote CVA  Chief Complaint   Patient presents with    Follow-Up from [facility] to [facility] yesterday - syncope          History of Present Illness:  Danita Armstrong is a 78 y.o. female. 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 Brenda Redd in hematology. She also has a history of TIA, and hyperlipidemia. In 1996, she had an episode of blurry eye sight with hand numbness that was diagnosed as a complicated migraine. She had an echo with bubble study April 4, 2014 - LV normal with LVEF 92-27%, diastolic dysfunction, trace MR, and moderate TR. Danita Armstrong was admitted to [facility] 10/30/14 with slurred speech, difficulty finding words, and transient headaches. She was transferred to [facility] and evaluated by oncology, neurology, and cardiology. She thinks the cause for symptoms was likely because of sub therapeutic INR and [facility] with bubble study 1/18/2016 that did not document an ASD. In 2015 she fell and fractured her hip and had surgery. Danita Armstrong reports having TIA's from time to time. In June 2016, she was on the phone with her son who lives in [facility] and she couldn't put two words together. She ended up being admitted to [facility] for three days with a TIA. She was on coumadin at the time with therapeutic INR's. Her neurologist is Dr. Bel Barahona. ARIN done in 12/27/16 showed no evidence of shunting, mild MR. Due to failure of coumadin she was switched to xarelto. Stopped Xarelto and changed to  Eliquis 2.5mg  Bid due to change in Kidney function. - Kidney function much improved. Was seen at [facility] earlier this week for syncopal episode witnessed by her son. Her workup was unremarkable. Likely TIA, no residual effects. Her son is with her for the visit.  He reports that this incidence was different/more severe than episodes in the past. Denies exertional chest pain, CHOUDHURY/PND, palpitations, light-headedness, edema. She has received the covid vaccine. Would not increase Eliquis dosage due to kidney function. Past Medical History:   has a past medical history of Allergic, Anxiety, Arthritis, C. difficile colitis, Depression, DVT (deep venous thrombosis) ([facility] [address] Fracture, sacrum/coccyx ([facility] [address] H/O migraine, Hx of blood clots, Hyperlipidemia, Movement disorder, Unspecified cerebral artery occlusion with cerebral infarction, Unspecified diseases of blood and blood-forming organs, and Vitamin D deficiency. Surgical History:   has a past surgical history that includes Carpal tunnel release; Knee arthroscopy (Bilateral); Colonoscopy; Tonsillectomy; other surgical history (Right); hip surgery (Right, 4/21/15.); fracture surgery (01/08/2018); and Cosmetic surgery. Social History:   reports that she has never smoked. She has never used smokeless tobacco. She reports that she does not drink alcohol and does not use drugs. Family History:  family history includes Arthritis in her mother; Asthma in her father; Depression in her mother; Heart Disease in her brother, father, and sister; Stroke in her mother.      Home medications:    Current Outpatient Medications:     PROLIA 60 MG/ML SOSY SC injection, INJECT ONE MILLILITER SUBCUTANEOUSLY EVERY 6 MONTHS, Disp: 1 Syringe, Rfl: 1    apixaban (ELIQUIS) 2.5 MG TABS tablet, Take 1 tablet by mouth 2 times daily, Disp: 180 tablet, Rfl: 1    Atorvastatin Calcium (LIPITOR PO), Take 40 mg by mouth daily , Disp: , Rfl:     melatonin 3 MG TABS tablet, Take 3 mg by mouth daily, Disp: , Rfl:     calcitRIOL (ROCALTROL) 0.25 MCG capsule, Take 1 capsule by mouth daily, Disp: 90 capsule, Rfl: 0    Coenzyme Q10 (COQ10 PO), Take by mouth, Disp: , Rfl:     Probiotic Product (PROBIOTIC-10) CAPS, Take by mouth daily, Disp: , Rfl:     Calcium Carb-Cholecalciferol (CALCIUM-VITAMIN D) 500-200 MG-UNIT per tablet, Take 1 tablet by mouth , Disp: , Rfl:     traZODone (DESYREL) 50 MG tablet, Take 100 mg by mouth nightly , Disp: , Rfl:     Multiple Vitamins-Minerals (THERAPEUTIC MULTIVITAMIN-MINERALS) tablet, Take 1 tablet by mouth daily, Disp: , Rfl:     Omega-3 Fatty Acids (FISH OIL) 1200 MG CAPS, Take 1 tablet by mouth daily. , Disp: , Rfl:     vitamin D (CHOLECALCIFEROL) 400 UNIT TABS tablet, Take 2,000 Units by mouth 2 times daily , Disp: , Rfl:         Allergies:  Flagyl [metronidazole], Omnicef, Cephalexin, and Penicillins     Review of Systems:   · Constitutional: there has been no unanticipated weight loss. There's been no change in energy level, sleep pattern, or activity level. · Eyes: No visual changes or diplopia. No scleral icterus. · ENT: No Headaches, hearing loss or vertigo. No mouth sores or sore throat. · Cardiovascular: Reviewed in HPI  · Respiratory: No cough or wheezing, no sputum production. No hematemesis. · Gastrointestinal: No abdominal pain, appetite loss, blood in stools. No change in bowel or bladder habits. · Genitourinary: No dysuria, trouble voiding, or hematuria. · Musculoskeletal:  No gait disturbance, weakness or joint complaints. · Integumentary: No rash or pruritis. · Neurological: No headache, diplopia, change in muscle strength, numbness or tingling. No change in gait, balance, coordination, mood, affect, memory, mentation, behavior. · Psychiatric: No anxiety, no depression. · Endocrine: No malaise, fatigue or temperature intolerance. No excessive thirst, fluid intake, or urination. No tremor. · Hematologic/Lymphatic: No abnormal bruising or bleeding, blood clots or swollen lymph nodes. · Allergic/Immunologic: No nasal congestion or hives.     Physical Examination:    Vitals:    07/07/21 1014   BP: 128/66   Pulse: 80   SpO2: 97%        Constitutional and General Appearance: NAD, pleasant  Skin:good turgor,intact without lesions  HEENT: EOMI ,normal  Neck:no JVD    Respiratory:  · Normal excursion and expansion without use of accessory muscles  · Resp Auscultation: Normal breath sounds without dullness  Cardiovascular:  · The apical impulses not displaced  · Heart tones are crisp and normal  · Cervical veins are not engorged  · The carotid upstroke is normal in amplitude and contour without delay or bruit  · Normal S1S2, No S3, no murmur  · Peripheral pulses are symmetrical and full  · There is no clubbing, cyanosis of the extremities. · No edema. · Femoral Arteries: 2+ and equal  · Pedal Pulses: 2+ and equal   Abdomen:  · No masses or tenderness  · Liver/Spleen: No Abnormalities Noted  Neurological/Psychiatric:  · Alert and oriented in all spheres  · Moves all extremities well  · Exhibits normal gait balance and coordination  · No abnormalities of mood, affect, memory, mentation, or behavior are noted    ARIN 12/27/16  Summary   Mild mitral regurgitation is present.   A bubble study was performed and showed no evidence of shunting. No evidence   to support presence of a PFO    1/2016 Echo Bubble Study---  Mild concentric left ventricular hypertrophy is present. Overall left ventricular function is normal.  Ejection fraction is visually estimated to be 55-60%. There is reversal of  E/A inflow velocities across the mitral valve suggesting impaired left ventricular relaxation. Severe calcification of the posterior mitral valve annulus. A bubble study was performed and fails to show evidence of shunting. Right heart size is borderline enlarged with normal RV function. Assessment:   1. Hypertension: Blood pressure 128/66, pulse 80, height 5' 3\" (1.6 m), weight 125 lb (56.7 kg), SpO2 97 %   Stable    2. Hyperlipidemia: Controlled -Lipitor 40mg    3. Occlusion and stenosis of carotid arteries--TIA (transient ischemic attack): Event in 2014,  recurrence in June 2016. No recurrence since June.  On Xarelto 20 mg.   6/17    Right 1-15%  Left 16-49%  Dopplers 11/27/19> <50% stenosis of the bilateral internal carotid arteries    4. Factor V Leiden: Hx of DVT while on hormone therapy, on coumadin. Dr. Hellen Chanel follows. 5.  TIA/possible cardiac syncope-  Recent syncopal episode seen at [facility] Aspirin 81mg daily, 7 day cardiac monitor       Plan:   Cardiac test and lab results personally reviewed by me during this office visit and discussed. Start Aspirin 81mg daily  7 day cardiac Monitor- evaluate for cardiac causes of syncope   Continue risk factor modifications- continue to exercise daily. Call for any change in symptoms. Return for regular follow up as scheduled in November. Thank you for allowing me to participate in the care of this individual.    Shiva Torres MD, [facility]    Patient's problem list, medications, allergies, past medical, surgical, social and family histories were reviewed and updated as appropriate. Scribe's attestation: This note was scribed in the presence of Dr. Julio Cesar Cosme MD, by Juliet Porter RN. The scribe's documentation has been prepared under my direction and personally reviewed by me in its entirety. I confirm that the note above accurately reflects all work, treatment, procedures, and medical decision making performed by me.

## 2021-07-07 ENCOUNTER — OFFICE VISIT (OUTPATIENT)
Dept: CARDIOLOGY CLINIC | Age: 81
End: 2021-07-07
Payer: MEDICARE

## 2021-07-07 VITALS
SYSTOLIC BLOOD PRESSURE: 128 MMHG | DIASTOLIC BLOOD PRESSURE: 66 MMHG | WEIGHT: 125 LBS | OXYGEN SATURATION: 97 % | HEIGHT: 63 IN | HEART RATE: 80 BPM | BODY MASS INDEX: 22.15 KG/M2

## 2021-07-07 DIAGNOSIS — I10 HTN (HYPERTENSION), BENIGN: ICD-10-CM

## 2021-07-07 DIAGNOSIS — D68.51 FACTOR V LEIDEN (HCC): ICD-10-CM

## 2021-07-07 DIAGNOSIS — R55 SYNCOPE, UNSPECIFIED SYNCOPE TYPE: Primary | ICD-10-CM

## 2021-07-07 DIAGNOSIS — E78.2 MIXED HYPERLIPIDEMIA: ICD-10-CM

## 2021-07-07 DIAGNOSIS — I65.23 BILATERAL CAROTID ARTERY STENOSIS: ICD-10-CM

## 2021-07-07 PROCEDURE — G8427 DOCREV CUR MEDS BY ELIG CLIN: HCPCS | Performed by: INTERNAL MEDICINE

## 2021-07-07 PROCEDURE — 1090F PRES/ABSN URINE INCON ASSESS: CPT | Performed by: INTERNAL MEDICINE

## 2021-07-07 PROCEDURE — 1111F DSCHRG MED/CURRENT MED MERGE: CPT | Performed by: INTERNAL MEDICINE

## 2021-07-07 PROCEDURE — 93246 EXT ECG>7D<15D RECORDING: CPT | Performed by: INTERNAL MEDICINE

## 2021-07-07 PROCEDURE — G8399 PT W/DXA RESULTS DOCUMENT: HCPCS | Performed by: INTERNAL MEDICINE

## 2021-07-07 PROCEDURE — 1036F TOBACCO NON-USER: CPT | Performed by: INTERNAL MEDICINE

## 2021-07-07 PROCEDURE — 1123F ACP DISCUSS/DSCN MKR DOCD: CPT | Performed by: INTERNAL MEDICINE

## 2021-07-07 PROCEDURE — G8420 CALC BMI NORM PARAMETERS: HCPCS | Performed by: INTERNAL MEDICINE

## 2021-07-07 PROCEDURE — 93000 ELECTROCARDIOGRAM COMPLETE: CPT | Performed by: INTERNAL MEDICINE

## 2021-07-07 PROCEDURE — 99214 OFFICE O/P EST MOD 30 MIN: CPT | Performed by: INTERNAL MEDICINE

## 2021-07-07 PROCEDURE — 4040F PNEUMOC VAC/ADMIN/RCVD: CPT | Performed by: INTERNAL MEDICINE

## 2021-07-07 RX ORDER — ASPIRIN 81 MG/1
81 TABLET ORAL DAILY
Qty: 90 TABLET | Refills: 1
Start: 2021-07-07

## 2021-07-22 PROCEDURE — 93248 EXT ECG>7D<15D REV&INTERPJ: CPT | Performed by: INTERNAL MEDICINE

## 2021-07-27 ENCOUNTER — TELEPHONE (OUTPATIENT)
Dept: CARDIOLOGY CLINIC | Age: 81
End: 2021-07-27

## 2021-07-27 NOTE — TELEPHONE ENCOUNTER
Pt's son, Yoli Driver, calling for results of the seven day holter monitor. It was returned 7/15/21.

## 2021-07-27 NOTE — TELEPHONE ENCOUNTER
Spoke with Sandra Brian, notified report is not read yet and that the office will call once it has been completed. Sandra Brian verbalized understanding.

## 2021-08-03 NOTE — TELEPHONE ENCOUNTER
Pt's son Dianrda Sylvester, calling in for holter results. Normal sinus rhythm, no atrial fibrillation.

## 2021-09-01 DIAGNOSIS — M81.0 AGE-RELATED OSTEOPOROSIS WITHOUT CURRENT PATHOLOGICAL FRACTURE: Chronic | ICD-10-CM

## 2021-09-01 RX ORDER — DENOSUMAB 60 MG/ML
INJECTION SUBCUTANEOUS
Qty: 1 ML | Refills: 0 | Status: SHIPPED | OUTPATIENT
Start: 2021-09-01 | End: 2022-03-08 | Stop reason: SDUPTHER

## 2021-10-12 ENCOUNTER — TELEPHONE (OUTPATIENT)
Dept: ENDOCRINOLOGY | Age: 81
End: 2021-10-12

## 2021-10-12 NOTE — TELEPHONE ENCOUNTER
Please advise patient that we can always look at her blood work via epic but patient has the responsibility of letting us know when they get the blood work done, there are 2 reasons that we do not get alerted when she gets her blood work done so I had not reviewed any of her previous labs until today. One is that the blood work is mostly ordered by her primary care physician so we do not get alerted for that second it is done in a different epic system than [facility] system so I do need to be made aware of that the patient got blood work done if they want me to take a look at the blood work results. I am sure her medical doctor has been discussing the results with them. Most recent vitamin D in September was in normal range, TSH level was 2.0 which was normal, in September her calcium level was 10.3. Hopefully she has been getting her Prolia infusions locally  Patient was last seen in the office in October 2020 I would recommend that we do a virtual visit and go over the lab results with patient and her son at that visit.

## 2021-10-12 NOTE — TELEPHONE ENCOUNTER
PT's son called to let Slade Medina know that the PT did receive the Prolia injection on 9/21 at her PCP doctor

## 2021-10-14 ENCOUNTER — OFFICE VISIT (OUTPATIENT)
Dept: ENDOCRINOLOGY | Age: 81
End: 2021-10-14
Payer: MEDICARE

## 2021-10-14 VITALS
BODY MASS INDEX: 23.55 KG/M2 | DIASTOLIC BLOOD PRESSURE: 79 MMHG | HEART RATE: 74 BPM | HEIGHT: 62 IN | WEIGHT: 128 LBS | SYSTOLIC BLOOD PRESSURE: 137 MMHG | RESPIRATION RATE: 16 BRPM

## 2021-10-14 DIAGNOSIS — M81.0 AGE-RELATED OSTEOPOROSIS WITHOUT CURRENT PATHOLOGICAL FRACTURE: Primary | ICD-10-CM

## 2021-10-14 DIAGNOSIS — E04.9 GOITER: ICD-10-CM

## 2021-10-14 DIAGNOSIS — E78.2 MIXED HYPERLIPIDEMIA: ICD-10-CM

## 2021-10-14 PROCEDURE — 4040F PNEUMOC VAC/ADMIN/RCVD: CPT | Performed by: INTERNAL MEDICINE

## 2021-10-14 PROCEDURE — 1090F PRES/ABSN URINE INCON ASSESS: CPT | Performed by: INTERNAL MEDICINE

## 2021-10-14 PROCEDURE — 1123F ACP DISCUSS/DSCN MKR DOCD: CPT | Performed by: INTERNAL MEDICINE

## 2021-10-14 PROCEDURE — G8484 FLU IMMUNIZE NO ADMIN: HCPCS | Performed by: INTERNAL MEDICINE

## 2021-10-14 PROCEDURE — 1036F TOBACCO NON-USER: CPT | Performed by: INTERNAL MEDICINE

## 2021-10-14 PROCEDURE — G8427 DOCREV CUR MEDS BY ELIG CLIN: HCPCS | Performed by: INTERNAL MEDICINE

## 2021-10-14 PROCEDURE — 99214 OFFICE O/P EST MOD 30 MIN: CPT | Performed by: INTERNAL MEDICINE

## 2021-10-14 PROCEDURE — G8399 PT W/DXA RESULTS DOCUMENT: HCPCS | Performed by: INTERNAL MEDICINE

## 2021-10-14 PROCEDURE — G8420 CALC BMI NORM PARAMETERS: HCPCS | Performed by: INTERNAL MEDICINE

## 2021-10-14 NOTE — LETTER
[facility]  [address]  [address] [id number]  Phone: [phone]  Fax: [phone]    Umberto Diop MD    October 14, 2021     [address]  [address] [address]    Patient: Tomi Hammond   MR Number: [id number]   YOB: 1940   Date of Visit: 10/14/2021       Dear [address]:    Thank you for referring Kinza Burdick to me for evaluation/treatment. Below are the relevant portions of my assessment and plan of care. If you have questions, please do not hesitate to call me. I look forward to following Alexis Leal along with you.     Sincerely,      Umberto Diop MD

## 2021-10-14 NOTE — PROGRESS NOTES
HISTORY OF PRESENT ILLNESS:   Patient is seen for osteoporosis diagnosed many years ago. The earliest DXA showing  osteoporosis is from 2013. She was taking alendronate for years. Son Juliet Palacios has power of  [phone], [email or web address]  She denies any fracture       Past medical history in regards to osteoporosis  Osteoporosis 2009 or before, DXA 10/2013, lowest T-score -3.1 in the 33% radius    Family history of osteoporosis, mom and sister    BMD decreased 0660-7162, 7186-4306, 8712-9622 and 1494-7535, 3109-6471, lowest T-score -3.0 LFN    04/2015 R intertrochanteric femur fracture    01/2018 R tri-malleolar ankle fx    06/2018 sacral fracture  ----menopause age uncertain      Factor V Leiden she reportedly had TIAs due to this and follows with the cardiologist.  Patient and her son feels that her memory has improved in the last few months. C diff after fracture repair  TIAs  Anxiety    --- previous MANAGEMENT FOR OSTEOPOROSIS. Calcium, 300 mg from low calcium foods,  450 mg almond milk, yogurt, fortified OJ       diet MVI Ca+D other total    Calcium 1350  600   mg/d   Vitamin D   800   IU/d       Exercise, 2 x weekly with   Pharmacologic therapy: Fosamax weekly started 2009 and stopped in sept 2019     Clarke Anthony.   Fosamax for 10+ years      INTERIM   She had TIA in July 2021 with no residual  weakness   She denies any fracture   Her son was on speaker phone during the visit   No new complaints  Denies any shortness of breath denies any chest pain denies any swallowing issues    Current Outpatient Medications on File Prior to Visit   Medication Sig Dispense Refill    PROLIA 60 MG/ML SOSY SC injection INJECT ONE MILLILITER SUBCUTANEOUSLY EVERY 6 MONTHS 1 mL 0    aspirin EC 81 MG EC tablet Take 1 tablet by mouth daily 90 tablet 1    apixaban (ELIQUIS) 2.5 MG TABS tablet Take 1 tablet by mouth 2 times daily 180 tablet 1    Atorvastatin Calcium (LIPITOR PO) Take 40 mg by mouth daily       melatonin 3 MG TABS tablet Take 3 mg by mouth daily      calcitRIOL (ROCALTROL) 0.25 MCG capsule Take 1 capsule by mouth daily 90 capsule 0    Coenzyme Q10 (COQ10 PO) Take by mouth      Probiotic Product (PROBIOTIC-10) CAPS Take by mouth daily      Calcium Carb-Cholecalciferol (CALCIUM-VITAMIN D) 500-200 MG-UNIT per tablet Take 1 tablet by mouth       traZODone (DESYREL) 50 MG tablet Take 100 mg by mouth nightly       Multiple Vitamins-Minerals (THERAPEUTIC MULTIVITAMIN-MINERALS) tablet Take 1 tablet by mouth daily      Omega-3 Fatty Acids (FISH OIL) 1200 MG CAPS Take 1 tablet by mouth daily.  vitamin D (CHOLECALCIFEROL) 400 UNIT TABS tablet Take 2,000 Units by mouth 2 times daily        No current facility-administered medications on file prior to visit. Past Medical History:   Diagnosis Date    Allergic     Anxiety     Arthritis     C. difficile colitis 02/08/2018; 3/23/2018    Depression     aniexty.  DVT (deep venous thrombosis) ([facility] [address] age 59    LLE whie on hormone therapy    Fracture, sacrum/coccyx ([facility] [address] 05/2018    H/O migraine     Hx of blood clots     Hyperlipidemia     Movement disorder     osteoporosis    Unspecified cerebral artery occlusion with cerebral infarction     tia---march 2009, 10/30/2014    Unspecified diseases of blood and blood-forming organs     Factyor V Leiden    Vitamin D deficiency      Past Surgical History:   Procedure Laterality Date    CARPAL TUNNEL RELEASE      right    COLONOSCOPY      COSMETIC SURGERY      eye lids lifted years ago    FRACTURE SURGERY  01/08/2018    orif rt ankle    HIP SURGERY Right 4/21/15.  KNEE ARTHROSCOPY Bilateral     OTHER SURGICAL HISTORY Right     Gamma nail right hip    TONSILLECTOMY       Social History     Socioeconomic History    Marital status:       Spouse name: Not on file    Number of children: 2    Years of education: Not on file    Highest education level: Not on file   Occupational History    Not on file   Tobacco Use    Smoking status: Never Smoker    Smokeless tobacco: Never Used   Vaping Use    Vaping Use: Never used   Substance and Sexual Activity    Alcohol use: No    Drug use: No    Sexual activity: Not Currently   Other Topics Concern    Not on file   Social History Narrative    Not on file     Social Determinants of Health     Financial Resource Strain:     Difficulty of Paying Living Expenses:    Food Insecurity:     Worried About Running Out of Food in the Last Year:     Ran Out of Food in the Last Year:    Transportation Needs:     Lack of Transportation (Medical):  Lack of Transportation (Non-Medical):    Physical Activity:     Days of Exercise per Week:     Minutes of Exercise per Session:    Stress:     Feeling of Stress :    Social Connections:     Frequency of Communication with Friends and Family:     Frequency of Social Gatherings with Friends and Family:     Attends [employer] Services:     Active Member of Clubs or Organizations:     Attends Club or Organization Meetings:     Marital Status:    Intimate Partner Violence:     Fear of Current or Ex-Partner:     Emotionally Abused:     Physically Abused:     Sexually Abused:        ROS  I have reviewed the review of system questionnaire filled by the patient .   Patient was advised to contact PCP for non endocrine signs and symptoms     Vitals:    10/14/21 1002   BP: 137/79   Pulse: 74   Resp: 16       EXAM   Constitutional: no acute distress, well appearing, well nourished  Psychiatric: oriented to person, place and time, judgement, insight and normal, recent and remote memory and intact and mood, affect are normal  Skin: skin and subcutaneous tissue is normal without mass,   Head and Face: examination of head and face revealed no abnormalities  Eyes: no lid or conjunctival swelling, no erythema or discharge, pupils are normal,   Ears/Nose: external inspection of ears and home, she has been getting her Prolia injections at her primary care office . I had a lengthy discussion with the son today that since my office is not administering her Prolia injection they would have to make sure that she continues to get this injection every 6 months as not taking the medication on its time can lead to further damage to bone they verbalized understanding and will assure that she takes Prolia every 6 months  . Son was also advised that since she has borderline creatinine and EGFR they need to call me and make sure that I get her lab results that she gets done locally to make sure her EGFR stays above 40 so that Prolia can be continued without any side effects     --- I reviewed the results of DEXA scan done on October 2021 at [facility]  with her in detail which shows significant improvement in bone mineral density she was advised to continue taking the medications along with optimizing calcium and vitamin D supplementation. Palpable goiter  Thyroid ultrasound ordered patient was advised to get it done locally and then call me so I can go over results    Hyperlipidemia patient is on Lipitor    Vitamin D deficiency she is on Vitamin D supplementation  1000 iu daily she will increase it to 2000 IUs daily      Elevated creatinine since September 2019  EGFR is still above 40 patient and her son was advised that if creatinine worsens we might have to adjust her therapy for osteoporosis as well. They were advised to call us back if there is significant worsening in the kidney functions noted. According to the son elevated creatinine is considered secondary to her being dehydrated and patient is already working on improving her hydration    History of TIA she is on Eliquis    Return in about 1 year (around 10/14/2022).

## 2021-10-25 ENCOUNTER — TELEPHONE (OUTPATIENT)
Dept: ENDOCRINOLOGY | Age: 81
End: 2021-10-25

## 2021-10-25 NOTE — TELEPHONE ENCOUNTER
Please advise pt that no thyroid nodules were identified in her thyroid ultrasound --results were normal   'x

## 2021-10-25 NOTE — TELEPHONE ENCOUNTER
Fax from [facility] regarding the results for the US neck that was done on 10/22/21

## 2021-11-02 NOTE — PROGRESS NOTES
Cardiac Follow Up    Referring Provider:  Kari Veliz     Cc- f/up for remote CVA  Chief Complaint   Patient presents with    Hyperlipidemia    Hypertension         History of Present Illness:  Conrado Vera is a 78 y.o. female. 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 Marialuisa Ross in hematology. She also has a history of TIA, and hyperlipidemia. In 1996, she had an episode of blurry eye sight with hand numbness that was diagnosed as a complicated migraine. She had an echo with bubble study April 4, 2014 - LV normal with LVEF 72-94%, diastolic dysfunction, trace MR, and moderate TR. Conrado Vera was admitted to [facility] 10/30/14 with slurred speech, difficulty finding words, and transient headaches. She was transferred to [facility] and evaluated by oncology, neurology, and cardiology. She thinks the cause for symptoms was likely because of sub therapeutic INR and TIA. Azael Hurst She had an echo with bubble study 1/18/2016 that did not document an ASD. In 2015 she fell and fractured her hip and had surgery. Conrado Vera reports having TIA's from time to time. In June 2016, she was on the phone with her son who lives in [facility] and she couldn't put two words together. She ended up being admitted to [facility] for three days with a TIA. She was on coumadin at the time with therapeutic INR's. Her neurologist is Dr. Seferino Flores. ARIN done in 12/27/16 showed no evidence of shunting, mild MR. Due to failure of coumadin she was switched to xarelto. Stopped Xarelto and changed to  Eliquis 2.5mg  Bid due to change in Kidney function. - Kidney function much improved. Was seen at [facility] in July 2021 for syncopal episode witnessed by her son. Her workup was unremarkable. Likely TIA, no residual effects. Her son is with her for the visit. She has been feeling well, she is active walks 35 minutes a day.   Denies exertional chest pain, CHOUDHURY/PND, palpitations, nightly , Disp: , Rfl:     Multiple Vitamins-Minerals (THERAPEUTIC MULTIVITAMIN-MINERALS) tablet, Take 1 tablet by mouth daily, Disp: , Rfl:     Omega-3 Fatty Acids (FISH OIL) 1200 MG CAPS, Take 1 tablet by mouth daily. , Disp: , Rfl:     vitamin D (CHOLECALCIFEROL) 400 UNIT TABS tablet, Take 2,000 Units by mouth 2 times daily , Disp: , Rfl:         Allergies:  Flagyl [metronidazole], Omnicef, Cephalexin, and Penicillins     Review of Systems:   · Constitutional: there has been no unanticipated weight loss. There's been no change in energy level, sleep pattern, or activity level. · Eyes: No visual changes or diplopia. No scleral icterus. · ENT: No Headaches, hearing loss or vertigo. No mouth sores or sore throat. · Cardiovascular: Reviewed in HPI  · Respiratory: No cough or wheezing, no sputum production. No hematemesis. · Gastrointestinal: No abdominal pain, appetite loss, blood in stools. No change in bowel or bladder habits. · Genitourinary: No dysuria, trouble voiding, or hematuria. · Musculoskeletal:  No gait disturbance, weakness or joint complaints. · Integumentary: No rash or pruritis. · Neurological: No headache, diplopia, change in muscle strength, numbness or tingling. No change in gait, balance, coordination, mood, affect, memory, mentation, behavior. · Psychiatric: No anxiety, no depression. · Endocrine: No malaise, fatigue or temperature intolerance. No excessive thirst, fluid intake, or urination. No tremor. · Hematologic/Lymphatic: No abnormal bruising or bleeding, blood clots or swollen lymph nodes. · Allergic/Immunologic: No nasal congestion or hives.     Physical Examination:    Vitals:    11/23/21 1440   BP: 132/72   Pulse: 77   SpO2: 100%        Constitutional and General Appearance: NAD, pleasant  Skin:good turgor,intact without lesions  HEENT: EOMI ,normal  Neck:no JVD    Respiratory:  · Normal excursion and expansion without use of accessory muscles  · Resp Auscultation: Normal breath sounds without dullness  Cardiovascular:  · The apical impulses not displaced  · Heart tones are crisp and normal  · Cervical veins are not engorged  · The carotid upstroke is normal in amplitude and contour without delay or bruit  · Normal S1S2, No S3, no murmur  · Peripheral pulses are symmetrical and full  · There is no clubbing, cyanosis of the extremities. · No edema. · Femoral Arteries: 2+ and equal  · Pedal Pulses: 2+ and equal   Abdomen:  · No masses or tenderness  · Liver/Spleen: No Abnormalities Noted  Neurological/Psychiatric:  · Alert and oriented in all spheres  · Moves all extremities well  · Exhibits normal gait balance and coordination  · No abnormalities of mood, affect, memory, mentation, or behavior are noted    ARIN 12/27/16  Summary   Mild mitral regurgitation is present.   A bubble study was performed and showed no evidence of shunting. No evidence   to support presence of a PFO    1/2016 Echo Bubble Study---  Mild concentric left ventricular hypertrophy is present. Overall left ventricular function is normal.  Ejection fraction is visually estimated to be 55-60%. There is reversal of  E/A inflow velocities across the mitral valve suggesting impaired left ventricular relaxation. Severe calcification of the posterior mitral valve annulus. A bubble study was performed and fails to show evidence of shunting. Right heart size is borderline enlarged with normal RV function. Assessment:   1. Hypertension:  Vitals:    11/23/21 1440   BP: 132/72   Pulse: 77   SpO2: 100%      Stable    2. Hyperlipidemia: Controlled -Lipitor 40mg    3. Occlusion and stenosis of carotid arteries--TIA (transient ischemic attack): Event in 2014,  recurrence in June 2016. No recurrence since June. On Xarelto 20 mg.   6/17    Right 1-15%  Left 16-49%  Dopplers 11/27/19> <50% stenosis of the bilateral internal carotid arteries   CTA Neck /5/21> No internal carotid artery stenosis      4.  Factor V Leiden: Hx of DVT while on hormone therapy, on coumadin. Dr. Jessica Rodriguez follows. 5.  TIA/possible cardiac syncope-  Recent syncopal episode seen at [facility]-  Aspirin 81mg daily, 7 day cardiac Monitor 7/21> NSR       Plan:   Cardiac test and lab results personally reviewed by me during this office visit and discussed. No med changes   Continue risk factor modifications- continue to exercise daily. Call for any change in symptoms. Return for regular follow up in 6 months       Thank you for allowing me to participate in the care of this individual.    El Vincent MD, [facility]      Patient's problem list, medications, allergies, past medical, surgical, social and family histories were reviewed and updated as appropriate. Scribe's attestation: This note was scribed in the presence of Dr. Fatuma Jiang MD, by Keyona Hyatt RN. The scribe's documentation has been prepared under my direction and personally reviewed by me in its entirety. I confirm that the note above accurately reflects all work, treatment, procedures, and medical decision making performed by me.

## 2021-11-15 ENCOUNTER — TELEPHONE (OUTPATIENT)
Dept: CARDIOLOGY CLINIC | Age: 81
End: 2021-11-15

## 2021-11-15 DIAGNOSIS — D68.51 FACTOR V LEIDEN (HCC): ICD-10-CM

## 2021-11-15 DIAGNOSIS — I65.23 BILATERAL CAROTID ARTERY STENOSIS: ICD-10-CM

## 2021-11-15 NOTE — TELEPHONE ENCOUNTER
Medication Refill    Medication needing refilled:  eliquis    Dosage of the medication:    How are you taking this medication (QD, BID, TID, QID, PRN):    30 or 90 day supply called in:    When will you run out of your medication:    Which Pharmacy are we sending the medication to?:  [facility] in [place]

## 2021-11-23 ENCOUNTER — OFFICE VISIT (OUTPATIENT)
Dept: CARDIOLOGY CLINIC | Age: 81
End: 2021-11-23
Payer: MEDICARE

## 2021-11-23 VITALS
WEIGHT: 127 LBS | DIASTOLIC BLOOD PRESSURE: 72 MMHG | HEIGHT: 62 IN | OXYGEN SATURATION: 100 % | HEART RATE: 77 BPM | SYSTOLIC BLOOD PRESSURE: 132 MMHG | BODY MASS INDEX: 23.37 KG/M2

## 2021-11-23 DIAGNOSIS — E78.2 MIXED HYPERLIPIDEMIA: ICD-10-CM

## 2021-11-23 DIAGNOSIS — I65.23 BILATERAL CAROTID ARTERY STENOSIS: Primary | ICD-10-CM

## 2021-11-23 DIAGNOSIS — D68.51 FACTOR V LEIDEN (HCC): ICD-10-CM

## 2021-11-23 DIAGNOSIS — I10 HTN (HYPERTENSION), BENIGN: ICD-10-CM

## 2021-11-23 PROCEDURE — 1090F PRES/ABSN URINE INCON ASSESS: CPT | Performed by: INTERNAL MEDICINE

## 2021-11-23 PROCEDURE — 1036F TOBACCO NON-USER: CPT | Performed by: INTERNAL MEDICINE

## 2021-11-23 PROCEDURE — 1123F ACP DISCUSS/DSCN MKR DOCD: CPT | Performed by: INTERNAL MEDICINE

## 2021-11-23 PROCEDURE — G8427 DOCREV CUR MEDS BY ELIG CLIN: HCPCS | Performed by: INTERNAL MEDICINE

## 2021-11-23 PROCEDURE — G8399 PT W/DXA RESULTS DOCUMENT: HCPCS | Performed by: INTERNAL MEDICINE

## 2021-11-23 PROCEDURE — 4040F PNEUMOC VAC/ADMIN/RCVD: CPT | Performed by: INTERNAL MEDICINE

## 2021-11-23 PROCEDURE — G8484 FLU IMMUNIZE NO ADMIN: HCPCS | Performed by: INTERNAL MEDICINE

## 2021-11-23 PROCEDURE — G8420 CALC BMI NORM PARAMETERS: HCPCS | Performed by: INTERNAL MEDICINE

## 2021-11-23 PROCEDURE — 99214 OFFICE O/P EST MOD 30 MIN: CPT | Performed by: INTERNAL MEDICINE

## 2022-03-08 ENCOUNTER — TELEPHONE (OUTPATIENT)
Dept: ENDOCRINOLOGY | Age: 82
End: 2022-03-08

## 2022-03-08 DIAGNOSIS — M81.0 AGE-RELATED OSTEOPOROSIS WITHOUT CURRENT PATHOLOGICAL FRACTURE: Chronic | ICD-10-CM

## 2022-03-08 RX ORDER — DENOSUMAB 60 MG/ML
INJECTION SUBCUTANEOUS
Qty: 1 ML | Refills: 0 | Status: SHIPPED | OUTPATIENT
Start: 2022-03-08 | End: 2022-09-29

## 2022-03-08 NOTE — TELEPHONE ENCOUNTER
Pt's son called requesting a refill on the Prolia to the listed pharmacy    The pt has the injection at the PCP     The pt's family also the MD to be aware that the pt had her most recent labs completed at her MD's office last week     Will inform the nurse that the labs are going to be sent to the office

## 2022-05-16 NOTE — PROGRESS NOTES
Cardiac Follow Up    Referring Provider:  Leydi Rivero       Chief Complaint   Patient presents with    Hypertension    Hyperlipidemia    6 Month Follow-Up         History of Present Illness:  Porfirio Hoover is a 78 y.o. female. 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 Oliva Parekh in hematology. She also has a history of TIA, and hyperlipidemia. In , she had an episode of blurry eye sight with hand numbness that was diagnosed as a complicated migraine. She had an echo with bubble study 2014 - LV normal with LVEF 97-70%, diastolic dysfunction, trace MR, and moderate TR. Porfirio Hoover was admitted to [facility] 10/30/14 with slurred speech, difficulty finding words, and transient headaches. She was transferred to [facility] and evaluated by oncology, neurology, and cardiology. She thinks the cause for symptoms was likely because of sub therapeutic INR and TIA. Anibal Jennings She had an echo with bubble study 2016 that did not document an ASD. In  she fell and fractured her hip and had surgery. Porfirio Hoover reports having TIA's from time to time. In 2016, she was on the phone with her son who lives in [facility] and she couldn't put two words together. She ended up being admitted to [facility] for three days with a TIA. She was on coumadin at the time with therapeutic INR's. Her neurologist is Dr. Radha Hannah. ARIN done in 16 showed no evidence of shunting, mild MR. Due to failure of coumadin she was switched to xarelto. Stopped Xarelto and changed to Eliquis 2.5mg bid due to change in Kidney function. - Kidney function much improved. Was seen at [facility] in 2021 for syncopal episode witnessed by her son. Her workup was unremarkable. Likely TIA, no residual effects. She has since been seen for memory loss. Porfirio Hoover is here for 6 mos office visit. Porfirio Hoover is with her son. She reports that she is sad because her cat .  She states she if feeling good and walking regularly. She walks 35 minutes per day as long as it is good weather outside. She states she does not have anything new to report as far as her health is concerned. Son states she saw Dr Maged Morales yesterday. Past Medical History:   has a past medical history of Allergic, Anxiety, Arthritis, C. difficile colitis, Depression, DVT (deep venous thrombosis) ([facility] [address] Fracture, sacrum/coccyx ([address] H/O migraine, Hx of blood clots, Hyperlipidemia, Movement disorder, Unspecified cerebral artery occlusion with cerebral infarction, Unspecified diseases of blood and blood-forming organs, and Vitamin D deficiency. Surgical History:   has a past surgical history that includes Carpal tunnel release; Knee arthroscopy (Bilateral); Colonoscopy; Tonsillectomy; other surgical history (Right); hip surgery (Right, 4/21/15.); fracture surgery (01/08/2018); and Cosmetic surgery. Social History:   reports that she has never smoked. She has never used smokeless tobacco. She reports that she does not drink alcohol and does not use drugs. Family History:  family history includes Arthritis in her mother; Asthma in her father; Depression in her mother; Heart Disease in her brother, father, and sister; Stroke in her mother.      Home medications:    Current Outpatient Medications:     galantamine (RAZADYNE ER) 8 MG extended release capsule, , Disp: , Rfl:     PROLIA 60 MG/ML SOSY SC injection, INJECT ONE MILLILITER SUBCUTANEOUSLY EVERY 6 MONTHS, Disp: 1 mL, Rfl: 0    apixaban (ELIQUIS) 2.5 MG TABS tablet, Take 1 tablet by mouth 2 times daily, Disp: 180 tablet, Rfl: 3    aspirin EC 81 MG EC tablet, Take 1 tablet by mouth daily, Disp: 90 tablet, Rfl: 1    Atorvastatin Calcium (LIPITOR PO), Take 40 mg by mouth daily , Disp: , Rfl:     melatonin 3 MG TABS tablet, Take 3 mg by mouth daily, Disp: , Rfl:     calcitRIOL (ROCALTROL) 0.25 MCG capsule, Take 1 capsule by mouth daily, Disp: 90 capsule, Rfl: 0   Probiotic Product (PROBIOTIC-10) CAPS, Take by mouth daily, Disp: , Rfl:     traZODone (DESYREL) 50 MG tablet, Take 100 mg by mouth nightly , Disp: , Rfl:     Multiple Vitamins-Minerals (THERAPEUTIC MULTIVITAMIN-MINERALS) tablet, Take 1 tablet by mouth daily, Disp: , Rfl:     Omega-3 Fatty Acids (FISH OIL) 1200 MG CAPS, Take 1 tablet by mouth daily. , Disp: , Rfl:     vitamin D (CHOLECALCIFEROL) 400 UNIT TABS tablet, Take 2,000 Units by mouth 2 times daily , Disp: , Rfl:         Allergies:  Flagyl [metronidazole], Omnicef, Cephalexin, and Penicillins     Review of Systems:   · Constitutional: there has been no unanticipated weight loss. There's been no change in energy level, sleep pattern, or activity level. · Eyes: No visual changes or diplopia. No scleral icterus. · ENT: No Headaches, hearing loss or vertigo. No mouth sores or sore throat. · Cardiovascular: Reviewed in HPI  · Respiratory: No cough or wheezing, no sputum production. No hematemesis. · Gastrointestinal: No abdominal pain, appetite loss, blood in stools. No change in bowel or bladder habits. · Genitourinary: No dysuria, trouble voiding, or hematuria. · Musculoskeletal:  No gait disturbance, weakness or joint complaints. · Integumentary: No rash or pruritis. · Neurological: No headache, diplopia, change in muscle strength, numbness or tingling. No change in gait, balance, coordination, mood, affect, memory, mentation, behavior. · Psychiatric: No anxiety, no depression. · Endocrine: No malaise, fatigue or temperature intolerance. No excessive thirst, fluid intake, or urination. No tremor. · Hematologic/Lymphatic: No abnormal bruising or bleeding, blood clots or swollen lymph nodes. · Allergic/Immunologic: No nasal congestion or hives.     Physical Examination:    Vitals:    05/27/22 1344   BP: 112/78   Pulse: 79   SpO2: 96%        Constitutional and General Appearance: NAD, pleasant  Skin:good turgor,intact without lesions  HEENT: EOMI ,normal  Neck:no JVD    Respiratory:  · Normal excursion and expansion without use of accessory muscles  · Resp Auscultation: Normal breath sounds without dullness  Cardiovascular:  · The apical impulses not displaced  · Heart tones are crisp and normal  · Cervical veins are not engorged  · The carotid upstroke is normal in amplitude and contour without delay or bruit  · Normal S1S2, No S3, no murmur  · Peripheral pulses are symmetrical and full  · There is no clubbing, cyanosis of the extremities. · No edema. · Femoral Arteries: 2+ and equal  · Pedal Pulses: 2+ and equal   Abdomen:  · No masses or tenderness  · Liver/Spleen: No Abnormalities Noted  Neurological/Psychiatric:  · Alert and oriented in all spheres  · Moves all extremities well  · Exhibits normal gait balance and coordination  · No abnormalities of mood, affect, memory, mentation, or behavior are noted    ARIN 12/27/16  Summary   Mild mitral regurgitation is present.   A bubble study was performed and showed no evidence of shunting. No evidence   to support presence of a PFO    1/2016 Echo Bubble Study---  Mild concentric left ventricular hypertrophy is present. Overall left ventricular function is normal.  Ejection fraction is visually estimated to be 55-60%. There is reversal of  E/A inflow velocities across the mitral valve suggesting impaired left ventricular relaxation. Severe calcification of the posterior mitral valve annulus. A bubble study was performed and fails to show evidence of shunting. Right heart size is borderline enlarged with normal RV function. Assessment:   1. Hypertension:  Vitals:    05/27/22 1344   BP: 112/78   Pulse: 79   SpO2: 96%     stable   Continue on current medication regimen     2. Hyperlipidemia:   controlled  Continue on Lipitor 40mg   3/2/22   HDL 68 LDL 91     3. Occlusion and stenosis of carotid arteries--TIA (transient ischemic attack): Event in 2014,  recurrence in June 2016.  No recurrence since June. On Xarelto 20 mg.   6/17    Right 1-15%  Left 16-49%  Dopplers 11/27/19> <50% stenosis of the bilateral internal carotid arteries   CTA Neck /5/21> No internal carotid artery stenosis      4. Factor V Leiden: Hx of DVT while on hormone therapy, was on coumadin. Dr. Dipak Villagomez follows. continue on Eliquis. 5.  TIA/possible cardiac syncope-  syncopal episode [facility]-  Continue on aspirin 81mg daily, 7 day cardiac Monitor 7/21> NSR      6 . Hyperglycemia        Try to limit chocolate to help with blood sugar control        blood sugar 179 on 3/18/22       Plan:   Cardiac test and lab results personally reviewed by me during this office visit and discussed. Follow up with Dr Isabella Zepeda about high blood sugar 179 on 3/18/22, discuss possible treatment  Try to limit chocolate to help with blood sugar control  Consider a sugar free chocolate  No med changes   Continue risk factor modifications   Call for any change in symptoms. Return for regular follow up in 6 mos      Thank you for allowing me to participate in the care of this individual.    Luana Rubin MD, [facility]        Patient's problem list, medications, allergies, past medical, surgical, social and family histories were reviewed and updated as appropriate. Scribe's attestation: This note was scribed in the presence of Dr Edi Preston by Adithya Mccormick RN. The scribe's documentation has been prepared under my direction and personally reviewed by me in its entirety. I confirm that the note above accurately reflects all work, treatment, procedures, and medical decision making performed by me.

## 2022-05-27 ENCOUNTER — OFFICE VISIT (OUTPATIENT)
Dept: CARDIOLOGY CLINIC | Age: 82
End: 2022-05-27
Payer: MEDICARE

## 2022-05-27 ENCOUNTER — TELEPHONE (OUTPATIENT)
Dept: CARDIOLOGY CLINIC | Age: 82
End: 2022-05-27

## 2022-05-27 VITALS
WEIGHT: 128.3 LBS | HEART RATE: 79 BPM | OXYGEN SATURATION: 96 % | BODY MASS INDEX: 22.73 KG/M2 | DIASTOLIC BLOOD PRESSURE: 78 MMHG | SYSTOLIC BLOOD PRESSURE: 112 MMHG | HEIGHT: 63 IN

## 2022-05-27 DIAGNOSIS — R55 SYNCOPE, UNSPECIFIED SYNCOPE TYPE: ICD-10-CM

## 2022-05-27 DIAGNOSIS — D68.51 FACTOR V LEIDEN (HCC): Primary | ICD-10-CM

## 2022-05-27 PROCEDURE — G8427 DOCREV CUR MEDS BY ELIG CLIN: HCPCS | Performed by: INTERNAL MEDICINE

## 2022-05-27 PROCEDURE — 1123F ACP DISCUSS/DSCN MKR DOCD: CPT | Performed by: INTERNAL MEDICINE

## 2022-05-27 PROCEDURE — 1090F PRES/ABSN URINE INCON ASSESS: CPT | Performed by: INTERNAL MEDICINE

## 2022-05-27 PROCEDURE — G8399 PT W/DXA RESULTS DOCUMENT: HCPCS | Performed by: INTERNAL MEDICINE

## 2022-05-27 PROCEDURE — 1036F TOBACCO NON-USER: CPT | Performed by: INTERNAL MEDICINE

## 2022-05-27 PROCEDURE — G8420 CALC BMI NORM PARAMETERS: HCPCS | Performed by: INTERNAL MEDICINE

## 2022-05-27 PROCEDURE — 99214 OFFICE O/P EST MOD 30 MIN: CPT | Performed by: INTERNAL MEDICINE

## 2022-05-27 RX ORDER — GALANTAMINE HYDROBROMIDE 8 MG/1
CAPSULE, EXTENDED RELEASE ORAL
COMMUNITY
Start: 2022-03-18

## 2022-05-27 NOTE — TELEPHONE ENCOUNTER
PT was just here for an apt today. PT son wants her to come back on Nov 22,2022 in [facility]. LES is booked that day. He is asking to be places at a end spot because he will fly in from [place] to be with her for her apt. I told PT son I would ask and call him back.  His name is Carlos Alberto Llanes [phone] Please advise

## 2022-05-27 NOTE — PATIENT INSTRUCTIONS
Follow up with Dr Benny Moreno about high blood sugar result of 179 on 3/18/22  Try to limit chocolate to help with blood sugar control  Consider a sugar free chocolate  No med changes   Continue risk factor modifications   Call for any change in symptoms.   Return for regular follow up in 6 mos

## 2022-05-31 NOTE — TELEPHONE ENCOUNTER
Is it ok to schedule her in a 3:15 \"end\" spot on 11/22/22 in [facility]?

## 2022-06-08 ENCOUNTER — TELEPHONE (OUTPATIENT)
Dept: ENDOCRINOLOGY | Age: 82
End: 2022-06-08

## 2022-06-13 NOTE — TELEPHONE ENCOUNTER
Patient is okay to have injection done here in the office. Her friend already receives the Prolia from our office. Verified both of their last injection dates and called son to move appointments to the following week to make sure insurance will cover injections for both patients. Patients son is going to verify the dates with both patients and will call office back if there are any issues.

## 2022-09-12 ENCOUNTER — TELEPHONE (OUTPATIENT)
Dept: ENDOCRINOLOGY | Age: 82
End: 2022-09-12

## 2022-09-12 NOTE — TELEPHONE ENCOUNTER
Call from [employer] pt's son letting us know that the pt did go get her labs done @ her PCP office Dr Ino Ann and she is part of [employer]. He states that she was suppose to get them done before her Prolia and she went recently to get them done.  I tried to run C.E. but there is a delay

## 2022-09-13 NOTE — TELEPHONE ENCOUNTER
Labs reviewed from care everywhere calcium 9.6 stable, Thyroid function test were within normal limits to, vitamin D level was also within normal limits at 55

## 2022-09-15 ENCOUNTER — TELEPHONE (OUTPATIENT)
Dept: ENDOCRINOLOGY | Age: 82
End: 2022-09-15

## 2022-09-28 NOTE — TELEPHONE ENCOUNTER
Keyshawn Flores, Keyshawn Munoz Do ~ [facility] Clinical Support Pool 3 days ago         Fasting labs In system thanks   Let pt know     Message text          Would be ok to be evaluated at 1 pm as long as leg is not painful or red.  If painful or red then go to ER

## 2022-09-29 ENCOUNTER — NURSE ONLY (OUTPATIENT)
Dept: ENDOCRINOLOGY | Age: 82
End: 2022-09-29
Payer: MEDICARE

## 2022-09-29 DIAGNOSIS — M81.0 AGE-RELATED OSTEOPOROSIS WITHOUT CURRENT PATHOLOGICAL FRACTURE: Primary | ICD-10-CM

## 2022-09-29 PROCEDURE — 96372 THER/PROPH/DIAG INJ SC/IM: CPT | Performed by: INTERNAL MEDICINE

## 2022-09-29 NOTE — PROGRESS NOTES
Patient presents for a prolia injection. Pt denies any prior adverse reactions. Prolia 60 mg given SubQ in the left arm with no complications. Patient tolerated well. Patient to return to office in 6 months for next injection.      Lot: [id number]  Exp: 3/31/205

## 2022-10-27 ENCOUNTER — OFFICE VISIT (OUTPATIENT)
Dept: ENDOCRINOLOGY | Age: 82
End: 2022-10-27
Payer: MEDICARE

## 2022-10-27 VITALS
HEART RATE: 74 BPM | WEIGHT: 121 LBS | BODY MASS INDEX: 22.26 KG/M2 | SYSTOLIC BLOOD PRESSURE: 139 MMHG | DIASTOLIC BLOOD PRESSURE: 67 MMHG | RESPIRATION RATE: 16 BRPM | HEIGHT: 62 IN

## 2022-10-27 DIAGNOSIS — E78.2 MIXED HYPERLIPIDEMIA: Chronic | ICD-10-CM

## 2022-10-27 DIAGNOSIS — I10 HTN (HYPERTENSION), BENIGN: ICD-10-CM

## 2022-10-27 DIAGNOSIS — M81.0 AGE-RELATED OSTEOPOROSIS WITHOUT CURRENT PATHOLOGICAL FRACTURE: Primary | ICD-10-CM

## 2022-10-27 PROCEDURE — 3078F DIAST BP <80 MM HG: CPT | Performed by: INTERNAL MEDICINE

## 2022-10-27 PROCEDURE — G8427 DOCREV CUR MEDS BY ELIG CLIN: HCPCS | Performed by: INTERNAL MEDICINE

## 2022-10-27 PROCEDURE — G8420 CALC BMI NORM PARAMETERS: HCPCS | Performed by: INTERNAL MEDICINE

## 2022-10-27 PROCEDURE — 1123F ACP DISCUSS/DSCN MKR DOCD: CPT | Performed by: INTERNAL MEDICINE

## 2022-10-27 PROCEDURE — 99213 OFFICE O/P EST LOW 20 MIN: CPT | Performed by: INTERNAL MEDICINE

## 2022-10-27 PROCEDURE — 1036F TOBACCO NON-USER: CPT | Performed by: INTERNAL MEDICINE

## 2022-10-27 PROCEDURE — G8399 PT W/DXA RESULTS DOCUMENT: HCPCS | Performed by: INTERNAL MEDICINE

## 2022-10-27 PROCEDURE — 1090F PRES/ABSN URINE INCON ASSESS: CPT | Performed by: INTERNAL MEDICINE

## 2022-10-27 PROCEDURE — 3074F SYST BP LT 130 MM HG: CPT | Performed by: INTERNAL MEDICINE

## 2022-10-27 PROCEDURE — G8484 FLU IMMUNIZE NO ADMIN: HCPCS | Performed by: INTERNAL MEDICINE

## 2022-10-27 NOTE — PROGRESS NOTES
HISTORY OF PRESENT ILLNESS:   Patient is seen for osteoporosis diagnosed many years ago. The earliest DXA showing  osteoporosis is from 2013. She was taking alendronate for years. Son Jhon See has power of  [phone], [email or web address]  She denies any fracture       Past medical history in regards to osteoporosis  Osteoporosis 2009 or before, DXA 10/2013, lowest T-score -3.1 in the 33% radius    Family history of osteoporosis, mom and sister    BMD decreased 2148-5134, 8734-6815, 5532-0778 and 5292-6759, 1589-5414, lowest T-score -3.0 LFN    04/2015 R intertrochanteric femur fracture    01/2018 R tri-malleolar ankle fx    06/2018 sacral fracture  ----menopause age uncertain      Factor V Leiden she reportedly had TIAs due to this and follows with the cardiologist.  Patient and her son feels that her memory has improved in the last few months. C diff after fracture repair  TIAs  Anxiety    --- previous MANAGEMENT FOR OSTEOPOROSIS. Calcium, 300 mg from low calcium foods,  450 mg almond milk, yogurt, fortified OJ       diet MVI Ca+D other total    Calcium 1350  600   mg/d   Vitamin D   800   IU/d       Exercise, 2 x weekly with   Pharmacologic therapy: Fosamax weekly started 2009 and stopped in sept 2019     Clarke Anthony.   Fosamax for 10+ years  She had TIA in July 2021 with no residual  weakness     INTERIM     No new complaints  Denies any shortness of breath denies any chest pain denies any swallowing issues    Current Outpatient Medications on File Prior to Visit   Medication Sig Dispense Refill    COENZYME Q10 PO Take by mouth      galantamine (RAZADYNE ER) 8 MG extended release capsule       apixaban (ELIQUIS) 2.5 MG TABS tablet Take 1 tablet by mouth 2 times daily 180 tablet 3    aspirin EC 81 MG EC tablet Take 1 tablet by mouth daily 90 tablet 1    Atorvastatin Calcium (LIPITOR PO) Take 40 mg by mouth daily       melatonin 3 MG TABS tablet Take 3 mg by mouth daily      calcitRIOL (ROCALTROL) 0.25 MCG capsule Take 1 capsule by mouth daily 90 capsule 0    Probiotic Product (PROBIOTIC-10) CAPS Take by mouth daily      traZODone (DESYREL) 50 MG tablet Take 100 mg by mouth nightly       Multiple Vitamins-Minerals (THERAPEUTIC MULTIVITAMIN-MINERALS) tablet Take 1 tablet by mouth daily      Omega-3 Fatty Acids (FISH OIL) 1200 MG CAPS Take 1 tablet by mouth daily. vitamin D (CHOLECALCIFEROL) 400 UNIT TABS tablet Take 2,000 Units by mouth 2 times daily        No current facility-administered medications on file prior to visit. Past Medical History:   Diagnosis Date    Allergic     Anxiety     Arthritis     C. difficile colitis 02/08/2018; 3/23/2018    Depression     aniexty. DVT (deep venous thrombosis) ([facility] [address] age 59    LLE whie on hormone therapy    Fracture, sacrum/coccyx ([facility] [address] 05/2018    H/O migraine     Hx of blood clots     Hyperlipidemia     Movement disorder     osteoporosis    Unspecified cerebral artery occlusion with cerebral infarction     tia---march 2009, 10/30/2014    Unspecified diseases of blood and blood-forming organs     Factyor V Leiden    Vitamin D deficiency      Past Surgical History:   Procedure Laterality Date    CARPAL TUNNEL RELEASE      right    COLONOSCOPY      COSMETIC SURGERY      eye lids lifted years ago    FRACTURE SURGERY  01/08/2018    orif rt ankle    HIP SURGERY Right 4/21/15. KNEE ARTHROSCOPY Bilateral     OTHER SURGICAL HISTORY Right     Gamma nail right hip    TONSILLECTOMY       Social History     Socioeconomic History    Marital status:       Spouse name: Not on file    Number of children: 2    Years of education: Not on file    Highest education level: Not on file   Occupational History    Not on file   Tobacco Use    Smoking status: Never    Smokeless tobacco: Never   Vaping Use    Vaping Use: Never used   Substance and Sexual Activity    Alcohol use: No    Drug use: No    Sexual activity: Not Currently Other Topics Concern    Not on file   Social History Narrative    Not on file     Social Determinants of Health     Financial Resource Strain: Not on file   Food Insecurity: Not on file   Transportation Needs: Not on file   Physical Activity: Not on file   Stress: Not on file   Social Connections: Not on file   Intimate Partner Violence: Not on file   Housing Stability: Not on file       ROS  I have reviewed the review of system questionnaire filled by the patient . Patient was advised to contact PCP for non endocrine signs and symptoms     Vitals:    10/27/22 1103   BP: 139/67   Pulse: 74   Resp: 16       EXAM   Constitutional: no acute distress, well appearing, well nourished  Psychiatric: oriented to person, place and time, judgement, insight and normal, recent and remote memory and intact and mood, affect are normal  Skin: skin and subcutaneous tissue is normal without mass,   Head and Face: examination of head and face revealed no abnormalities  Eyes: no lid or conjunctival swelling, no erythema or discharge, pupils are normal,   Ears/Nose: external inspection of ears and nose revealed no abnormalities, hearing is grossly normal  Oropharynx/Mouth/Face: lips, tongue and gums are normal with no lesions, the voice quality was normal  Neck: neck is supple and symmetric, with midline trachea and no masses, thyroid is normal    Pulmonary: no increased work of breathing or signs of respiratory distress, lungs are clear to auscultation  Cardiovascular: normal heart rate and rhythm, normal S1 and S2,   Musculoskeletal: normal gait and station,   Neurological: normal coordination, normal general cortical function      ASSESSMENT and PLAN .         Osteoporosis  Vit D 55 in sept 2022   Calcium tab daily   She gets 2--3 servings of dairy daily   Advised to weight bearing exercises as tolerated   She has been taking Prolia without any side effects since sept 2018   Scheduled for next Prolia injection in 6 months  ---last DEXA scan was in oct 2021 which showed improvement   --dexa scan due in Oct 2023    Despite treatment with Fosamax since ~2009, BMD  decreased and she has had multiple fractures. She was switched to Prolia in 2018      --- I reviewed the results of DEXA scan done on October 2021 at [facility]  with her in detail which shows significant improvement in bone mineral density she was advised to continue taking the medications along with optimizing calcium and vitamin D supplementation. Palpable goiter  Thyroid uls was normal in oct 2021     Hyperlipidemia patient is on Lipitor    Vitamin D deficiency she is on Vitamin D supplementation  1000 iu daily she will increase it to 2000 IUs daily      Elevated creatinine since September 2019  EGFR is still above 40 patient and her son was advised that if creatinine worsens we might have to adjust her therapy for osteoporosis as well. They were advised to call us back if there is significant worsening in the kidney functions noted. According to the son elevated creatinine is considered secondary to her being dehydrated and patient is already working on improving her hydration    History of TIA she is on Eliquis    Return in about 6 months (around 4/27/2023).

## 2022-11-10 NOTE — PROGRESS NOTES
Cardiac Follow Up    Referring Provider:  Mau Montilla       Chief Complaint   Patient presents with    Hyperlipidemia    Hypertension    6 Month Follow-Up           History of Present Illness:  Tawnya Hester is a 78 y.o. female. 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 Ras Azul in hematology. She also has a history of TIA, and hyperlipidemia. In 1996, she had an episode of blurry eye sight with hand numbness that was diagnosed as a complicated migraine. She had an echo with bubble study April 4, 2014 - LV normal with LVEF 51-04%, diastolic dysfunction, trace MR, and moderate TR. Tawnya Hester was admitted to [facility] 10/30/14 with slurred speech, difficulty finding words, and transient headaches. She was transferred to [facility] and evaluated by oncology, neurology, and cardiology. She thinks the cause for symptoms was likely because of sub therapeutic INR and TIA. Palomo Mann She had an echo with bubble study 1/18/2016 that did not document an ASD. In 2015 she fell and fractured her hip and had surgery. Tawnya Hester reports having TIA's from time to time. In June 2016, she was on the phone with her son who lives in [facility] and she couldn't put two words together. She ended up being admitted to [facility] for three days with a TIA. She was on coumadin at the time with therapeutic INR's. Her neurologist is Dr. Denise Bay. ARIN done in 12/27/16 showed no evidence of shunting, mild MR. Due to failure of coumadin she was switched to xarelto. Stopped Xarelto and changed to Eliquis 2.5mg bid due to change in Kidney function. - Kidney function much improved. Was seen at [facility] in July 2021 for syncopal episode witnessed by her son. Her workup was unremarkable. Likely TIA, no residual effects. She has since been seen for memory loss. Tawnya Hester is here for follow up. She is with her son who is here through Saturday from [place].   She is active with her Zoroastrianism, she sang this past Sunday and she participates in Bible study. 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 She tolerates her activities well and denies exertional chest pain, CHOUDHURY/PND, palpitations, light-headedness, edema. She will use a cycle in her house when it is too cold to walk outside. Per son, she has been limiting her sugar intake. Her house is a one story home. Past Medical History:   has a past medical history of Allergic, Anxiety, Arthritis, C. difficile colitis, Depression, DVT (deep venous thrombosis) ([facility] Fracture, sacrum/coccyx ([facility] H/O migraine, Hx of blood clots, Hyperlipidemia, Movement disorder, Unspecified cerebral artery occlusion with cerebral infarction, Unspecified diseases of blood and blood-forming organs, and Vitamin D deficiency. Surgical History:   has a past surgical history that includes Carpal tunnel release; Knee arthroscopy (Bilateral); Colonoscopy; Tonsillectomy; other surgical history (Right); hip surgery (Right, 4/21/15.); fracture surgery (01/08/2018); and Cosmetic surgery. Social History:   reports that she has never smoked. She has never used smokeless tobacco. She reports that she does not drink alcohol and does not use drugs. Family History:  family history includes Arthritis in her mother; Asthma in her father; Depression in her mother; Heart Disease in her brother, father, and sister; Stroke in her mother.      Home medications:    Current Outpatient Medications:     COENZYME Q10 PO, Take by mouth, Disp: , Rfl:     galantamine (RAZADYNE ER) 8 MG extended release capsule, , Disp: , Rfl:     apixaban (ELIQUIS) 2.5 MG TABS tablet, Take 1 tablet by mouth 2 times daily, Disp: 180 tablet, Rfl: 3    aspirin EC 81 MG EC tablet, Take 1 tablet by mouth daily, Disp: 90 tablet, Rfl: 1    Atorvastatin Calcium (LIPITOR PO), Take 40 mg by mouth daily , Disp: , Rfl:     melatonin 3 MG TABS tablet, Take 3 mg by mouth daily, Disp: , Rfl:     calcitRIOL (ROCALTROL) 0.25 MCG capsule, Take 1 capsule by mouth daily, Disp: 90 capsule, Rfl: 0    Probiotic Product (PROBIOTIC-10) CAPS, Take by mouth daily, Disp: , Rfl:     traZODone (DESYREL) 50 MG tablet, Take 100 mg by mouth nightly , Disp: , Rfl:     Multiple Vitamins-Minerals (THERAPEUTIC MULTIVITAMIN-MINERALS) tablet, Take 1 tablet by mouth daily, Disp: , Rfl:     Omega-3 Fatty Acids (FISH OIL) 1200 MG CAPS, Take 1 tablet by mouth daily. , Disp: , Rfl:     vitamin D (CHOLECALCIFEROL) 400 UNIT TABS tablet, Take 2,000 Units by mouth 2 times daily , Disp: , Rfl:         Allergies:  Flagyl [metronidazole], Omnicef, Cephalexin, and Penicillins     Review of Systems:   Constitutional: there has been no unanticipated weight loss. There's been no change in energy level, sleep pattern, or activity level. Eyes: No visual changes or diplopia. No scleral icterus. ENT: No Headaches, hearing loss or vertigo. No mouth sores or sore throat. Cardiovascular: Reviewed in HPI  Respiratory: No cough or wheezing, no sputum production. No hematemesis. Gastrointestinal: No abdominal pain, appetite loss, blood in stools. No change in bowel or bladder habits. Genitourinary: No dysuria, trouble voiding, or hematuria. Musculoskeletal:  No gait disturbance, weakness or joint complaints. Integumentary: No rash or pruritis. Neurological: No headache, diplopia, change in muscle strength, numbness or tingling. No change in gait, balance, coordination, mood, affect, memory, mentation, behavior. Psychiatric: No anxiety, no depression. Endocrine: No malaise, fatigue or temperature intolerance. No excessive thirst, fluid intake, or urination. No tremor. Hematologic/Lymphatic: No abnormal bruising or bleeding, blood clots or swollen lymph nodes. Allergic/Immunologic: No nasal congestion or hives.     Physical Examination:    Vitals:    11/22/22 1520   BP: 116/70   Pulse: 74   SpO2: 98%          Constitutional and General Appearance: NAD, pleasant  Skin:good turgor,intact without lesions  HEENT: EOMI ,normal  Neck:no JVD    Respiratory:  Normal excursion and expansion without use of accessory muscles  Resp Auscultation: Normal breath sounds without dullness  Cardiovascular: The apical impulses not displaced  Heart tones are crisp and normal  Cervical veins are not engorged  The carotid upstroke is normal in amplitude and contour without delay or bruit  Normal S1S2, No S3, no murmur  Peripheral pulses are symmetrical and full  There is no clubbing, cyanosis of the extremities. No edema. Femoral Arteries: 2+ and equal  Pedal Pulses: 2+ and equal   Abdomen:  No masses or tenderness  Liver/Spleen: No Abnormalities Noted  Neurological/Psychiatric:  Alert and oriented in all spheres  Moves all extremities well  Exhibits normal gait balance and coordination  No abnormalities of mood, affect, memory, mentation, or behavior are noted    ARIN 12/27/16  Summary   Mild mitral regurgitation is present. A bubble study was performed and showed no evidence of shunting. No evidence   to support presence of a PFO    1/2016 Echo Bubble Study---  Mild concentric left ventricular hypertrophy is present. Overall left ventricular function is normal.  Ejection fraction is visually estimated to be 55-60%. There is reversal of  E/A inflow velocities across the mitral valve suggesting impaired left ventricular relaxation. Severe calcification of the posterior mitral valve annulus. A bubble study was performed and fails to show evidence of shunting. Right heart size is borderline enlarged with normal RV function. Assessment:   1. Hypertension:  Vitals:    11/22/22 1520   BP: 116/70   Pulse: 74   SpO2: 98%     stable   Continue on current medication regimen     2. Hyperlipidemia:   controlled  Continue on Lipitor 40mg daily  3/2/22   HDL 68 LDL 91  9/7/22  TG 81  HDL 69 LDL 93     3.  Occlusion and stenosis of carotid arteries--TIA (transient ischemic attack): Event in 2014,  recurrence in June 2016. No recurrence since June. On Xarelto 20 mg.   6/17    Right 1-15%  Left 16-49%  Dopplers 11/27/19> <50% stenosis of the bilateral internal carotid arteries   CTA Neck /5/21> No internal carotid artery stenosis   Continue daily ASA / statin     4. 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 Dr. Elissa Killian follows. continue on Eliquis 2.5 daily     5. TIA/possible cardiac syncope-  syncopal episode seen at [place] [address] on aspirin 81mg daily and Eliquis,  7 day cardiac Monitor 7/21> NSR      6 . Hyperglycemia        Per son, she has been significantly limiting her sugar intake. blood sugar 179 on 3/18/22         A1C 9/7/22 5.9        Follows with Dr Gillespie Se:   Cardiac test and lab results personally reviewed by me during this office visit and discussed. Continue risk factor modifications. Call for any change in symptoms, call to report any changes in shortness of breath or development of chest pain with activity. Follow up in 6 mos      Thank you for allowing me to participate in the care of this individual.    Savanah Armando MD, [facility]      Patient's problem list, medications, allergies, past medical, surgical, social and family histories were reviewed and updated as appropriate. Scribe's attestation: This note was scribed in the presence of Dr Froylan Viera by Gwinda Curling, RN. The scribe's documentation has been prepared under my direction and personally reviewed by me in its entirety. I confirm that the note above accurately reflects all work, treatment, procedures, and medical decision making performed by me.

## 2022-11-22 ENCOUNTER — OFFICE VISIT (OUTPATIENT)
Dept: CARDIOLOGY CLINIC | Age: 82
End: 2022-11-22
Payer: MEDICARE

## 2022-11-22 VITALS
OXYGEN SATURATION: 98 % | HEIGHT: 62 IN | BODY MASS INDEX: 22.14 KG/M2 | WEIGHT: 120.3 LBS | DIASTOLIC BLOOD PRESSURE: 70 MMHG | SYSTOLIC BLOOD PRESSURE: 116 MMHG | HEART RATE: 74 BPM

## 2022-11-22 DIAGNOSIS — I65.23 BILATERAL CAROTID ARTERY STENOSIS: ICD-10-CM

## 2022-11-22 DIAGNOSIS — E78.2 MIXED HYPERLIPIDEMIA: ICD-10-CM

## 2022-11-22 DIAGNOSIS — D68.51 FACTOR V LEIDEN (HCC): ICD-10-CM

## 2022-11-22 DIAGNOSIS — R55 SYNCOPE, UNSPECIFIED SYNCOPE TYPE: ICD-10-CM

## 2022-11-22 DIAGNOSIS — I10 HTN (HYPERTENSION), BENIGN: Primary | ICD-10-CM

## 2022-11-22 PROCEDURE — G8484 FLU IMMUNIZE NO ADMIN: HCPCS | Performed by: INTERNAL MEDICINE

## 2022-11-22 PROCEDURE — 3074F SYST BP LT 130 MM HG: CPT | Performed by: INTERNAL MEDICINE

## 2022-11-22 PROCEDURE — 1090F PRES/ABSN URINE INCON ASSESS: CPT | Performed by: INTERNAL MEDICINE

## 2022-11-22 PROCEDURE — 1036F TOBACCO NON-USER: CPT | Performed by: INTERNAL MEDICINE

## 2022-11-22 PROCEDURE — 1123F ACP DISCUSS/DSCN MKR DOCD: CPT | Performed by: INTERNAL MEDICINE

## 2022-11-22 PROCEDURE — G8427 DOCREV CUR MEDS BY ELIG CLIN: HCPCS | Performed by: INTERNAL MEDICINE

## 2022-11-22 PROCEDURE — G8399 PT W/DXA RESULTS DOCUMENT: HCPCS | Performed by: INTERNAL MEDICINE

## 2022-11-22 PROCEDURE — G8420 CALC BMI NORM PARAMETERS: HCPCS | Performed by: INTERNAL MEDICINE

## 2022-11-22 PROCEDURE — 3078F DIAST BP <80 MM HG: CPT | Performed by: INTERNAL MEDICINE

## 2022-11-22 PROCEDURE — 99214 OFFICE O/P EST MOD 30 MIN: CPT | Performed by: INTERNAL MEDICINE

## 2022-12-14 DIAGNOSIS — D68.51 FACTOR V LEIDEN (HCC): ICD-10-CM

## 2022-12-14 DIAGNOSIS — I65.23 BILATERAL CAROTID ARTERY STENOSIS: ICD-10-CM

## 2022-12-14 RX ORDER — APIXABAN 2.5 MG/1
TABLET, FILM COATED ORAL
Qty: 180 TABLET | Refills: 3 | Status: SHIPPED | OUTPATIENT
Start: 2022-12-14

## 2022-12-14 NOTE — TELEPHONE ENCOUNTER
Received refill request for Eliquis from [address] [place] [address].     Last ov:11/22/2022 LES    Last labs:09/07/2022 CMP    Last Refill:11/15/2021 # 180 tabs w/ 3 refills     Next appointment:05/09/2023 LES

## 2023-02-28 ENCOUNTER — TELEPHONE (OUTPATIENT)
Dept: CARDIOLOGY CLINIC | Age: 83
End: 2023-02-28

## 2023-02-28 NOTE — LETTER
February 28, 2023       [facility] YOB: 1940   [address] Date of Visit:  2/28/2023       To Whom It May Concern: It is my medical opinion that Selin Tucker will need to hold Aspirin for 5 days and Eliquis for 3 days prior to Lumbar Puncture. If you have any questions or concerns, please don't hesitate to call.     Sincerely,        Delbert Rausch MD

## 2023-02-28 NOTE — TELEPHONE ENCOUNTER
Zafar Medina called  to hold the Pt meds for an Lumbar Puncture,  Eliquis 2.5mg, aspirin 81mg , Omega 3 fatty acids, held for 3 days. This is not a surgery just a spinal tab. Please fax the hold to : Tina's fax: [phone]. Please advise.   Thank you

## 2023-03-15 ENCOUNTER — TELEPHONE (OUTPATIENT)
Dept: ENDOCRINOLOGY | Age: 83
End: 2023-03-15

## 2023-03-30 ENCOUNTER — OFFICE VISIT (OUTPATIENT)
Dept: ENDOCRINOLOGY | Age: 83
End: 2023-03-30
Payer: MEDICARE

## 2023-03-30 ENCOUNTER — NURSE ONLY (OUTPATIENT)
Dept: ENDOCRINOLOGY | Age: 83
End: 2023-03-30
Payer: MEDICARE

## 2023-03-30 VITALS
SYSTOLIC BLOOD PRESSURE: 135 MMHG | HEART RATE: 87 BPM | BODY MASS INDEX: 23 KG/M2 | DIASTOLIC BLOOD PRESSURE: 68 MMHG | HEIGHT: 62 IN | WEIGHT: 125 LBS | RESPIRATION RATE: 16 BRPM

## 2023-03-30 DIAGNOSIS — I65.23 BILATERAL CAROTID ARTERY STENOSIS: ICD-10-CM

## 2023-03-30 DIAGNOSIS — E78.2 MIXED HYPERLIPIDEMIA: Chronic | ICD-10-CM

## 2023-03-30 DIAGNOSIS — M81.0 AGE-RELATED OSTEOPOROSIS WITHOUT CURRENT PATHOLOGICAL FRACTURE: Primary | Chronic | ICD-10-CM

## 2023-03-30 DIAGNOSIS — M81.0 AGE-RELATED OSTEOPOROSIS WITHOUT CURRENT PATHOLOGICAL FRACTURE: Primary | ICD-10-CM

## 2023-03-30 DIAGNOSIS — I10 HTN (HYPERTENSION), BENIGN: ICD-10-CM

## 2023-03-30 PROCEDURE — G8427 DOCREV CUR MEDS BY ELIG CLIN: HCPCS | Performed by: INTERNAL MEDICINE

## 2023-03-30 PROCEDURE — 3075F SYST BP GE 130 - 139MM HG: CPT | Performed by: INTERNAL MEDICINE

## 2023-03-30 PROCEDURE — 1036F TOBACCO NON-USER: CPT | Performed by: INTERNAL MEDICINE

## 2023-03-30 PROCEDURE — 1123F ACP DISCUSS/DSCN MKR DOCD: CPT | Performed by: INTERNAL MEDICINE

## 2023-03-30 PROCEDURE — 3078F DIAST BP <80 MM HG: CPT | Performed by: INTERNAL MEDICINE

## 2023-03-30 PROCEDURE — G8420 CALC BMI NORM PARAMETERS: HCPCS | Performed by: INTERNAL MEDICINE

## 2023-03-30 PROCEDURE — 1090F PRES/ABSN URINE INCON ASSESS: CPT | Performed by: INTERNAL MEDICINE

## 2023-03-30 PROCEDURE — G8484 FLU IMMUNIZE NO ADMIN: HCPCS | Performed by: INTERNAL MEDICINE

## 2023-03-30 PROCEDURE — 96372 THER/PROPH/DIAG INJ SC/IM: CPT | Performed by: INTERNAL MEDICINE

## 2023-03-30 PROCEDURE — 99213 OFFICE O/P EST LOW 20 MIN: CPT | Performed by: INTERNAL MEDICINE

## 2023-03-30 PROCEDURE — G8399 PT W/DXA RESULTS DOCUMENT: HCPCS | Performed by: INTERNAL MEDICINE

## 2023-03-30 NOTE — PROGRESS NOTES
Patient presents for a prolia injection. Pt denies any prior adverse reactions. Prolia 60 mg given SubQ in the left arm with no complications. Patient tolerated well. Patient to return to office in 6 months for next injection. Double Island Pedicle Flap Text: The defect edges were debeveled with a #15 scalpel blade.  Given the location of the defect, shape of the defect and the proximity to free margins a double island pedicle advancement flap was deemed most appropriate.  Using a sterile surgical marker, an appropriate advancement flap was drawn incorporating the defect, outlining the appropriate donor tissue and placing the expected incisions within the relaxed skin tension lines where possible.    The area thus outlined was incised deep to adipose tissue with a #15 scalpel blade.  The skin margins were undermined to an appropriate distance in all directions around the primary defect and laterally outward around the island pedicle utilizing iris scissors.  There was minimal undermining beneath the pedicle flap.

## 2023-03-30 NOTE — PROGRESS NOTES
fractures. She was switched to Prolia in 2018      --- I reviewed the results of DEXA scan done on October 2021 at [facility]  with her in detail which shows significant improvement in bone mineral density she was advised to continue taking the medications along with optimizing calcium and vitamin D supplementation. Palpable goiter  Thyroid uls was normal in oct 2021   No further work-up was suggested by radiology     Hyperlipidemia patient is on Lipitor    Vitamin D deficiency she is on Vitamin D supplementation  1000 iu daily she will increase it to 2000 IUs daily      Elevated creatinine since September 2019  EGFR is still above 40 patient and her son was advised that if creatinine worsens we might have to adjust her therapy for osteoporosis as well. They were advised to call us back if there is significant worsening in the kidney functions noted. According to the son elevated creatinine is considered secondary to her being dehydrated and patient is already working on improving her hydration    History of TIA she is on Eliquis    Return in about 6 months (around 9/30/2023).

## 2023-03-31 ENCOUNTER — TELEPHONE (OUTPATIENT)
Dept: ENDOCRINOLOGY | Age: 83
End: 2023-03-31

## 2023-03-31 NOTE — TELEPHONE ENCOUNTER
Pt just had appt yesterday 3/30/23 and rec'd her lab results from 3/17/23 from [address]  in media if still needed

## 2023-05-09 ENCOUNTER — OFFICE VISIT (OUTPATIENT)
Dept: CARDIOLOGY CLINIC | Age: 83
End: 2023-05-09
Payer: MEDICARE

## 2023-05-09 VITALS
WEIGHT: 123.7 LBS | SYSTOLIC BLOOD PRESSURE: 124 MMHG | HEART RATE: 89 BPM | BODY MASS INDEX: 22.76 KG/M2 | DIASTOLIC BLOOD PRESSURE: 62 MMHG | OXYGEN SATURATION: 98 % | HEIGHT: 62 IN

## 2023-05-09 DIAGNOSIS — D68.51 FACTOR V LEIDEN (HCC): ICD-10-CM

## 2023-05-09 DIAGNOSIS — R55 SYNCOPE, UNSPECIFIED SYNCOPE TYPE: ICD-10-CM

## 2023-05-09 DIAGNOSIS — I10 HTN (HYPERTENSION), BENIGN: Primary | ICD-10-CM

## 2023-05-09 DIAGNOSIS — E78.2 MIXED HYPERLIPIDEMIA: ICD-10-CM

## 2023-05-09 DIAGNOSIS — I65.23 BILATERAL CAROTID ARTERY STENOSIS: ICD-10-CM

## 2023-05-09 PROCEDURE — G8427 DOCREV CUR MEDS BY ELIG CLIN: HCPCS | Performed by: INTERNAL MEDICINE

## 2023-05-09 PROCEDURE — 3078F DIAST BP <80 MM HG: CPT | Performed by: INTERNAL MEDICINE

## 2023-05-09 PROCEDURE — 1090F PRES/ABSN URINE INCON ASSESS: CPT | Performed by: INTERNAL MEDICINE

## 2023-05-09 PROCEDURE — 1123F ACP DISCUSS/DSCN MKR DOCD: CPT | Performed by: INTERNAL MEDICINE

## 2023-05-09 PROCEDURE — 99214 OFFICE O/P EST MOD 30 MIN: CPT | Performed by: INTERNAL MEDICINE

## 2023-05-09 PROCEDURE — 1036F TOBACCO NON-USER: CPT | Performed by: INTERNAL MEDICINE

## 2023-05-09 PROCEDURE — 3074F SYST BP LT 130 MM HG: CPT | Performed by: INTERNAL MEDICINE

## 2023-05-09 PROCEDURE — G8420 CALC BMI NORM PARAMETERS: HCPCS | Performed by: INTERNAL MEDICINE

## 2023-05-09 PROCEDURE — G8399 PT W/DXA RESULTS DOCUMENT: HCPCS | Performed by: INTERNAL MEDICINE

## 2023-08-21 ENCOUNTER — TELEPHONE (OUTPATIENT)
Dept: ENDOCRINOLOGY | Age: 83
End: 2023-08-21

## 2023-08-21 NOTE — TELEPHONE ENCOUNTER
Call from pt's son Clarissa Ask stating the pt had her Dexa scan done today @ [facility]   [phone] [facility] Phone

## 2023-08-22 NOTE — TELEPHONE ENCOUNTER
Please advise patient's son that I reviewed the results of her most recent DEXA scan done yesterday and it shows that the bone mineral density is improved in her spine and her hip and she should continue with the current therapy of Prolia injections.   Every 6 months

## 2023-09-01 ENCOUNTER — TELEPHONE (OUTPATIENT)
Dept: ENDOCRINOLOGY | Age: 83
End: 2023-09-01

## 2023-09-01 NOTE — TELEPHONE ENCOUNTER
Pt son called in and needs to know how long before or after pt gets prolia she can get covid and flu vx.  Please advise and call pt son back

## 2023-09-08 NOTE — TELEPHONE ENCOUNTER
Patient's son would like to get her covid booster as soon as possible. If she gets it when it becomes available on 9/18-ish, that would be 2 weeks before Prolia. Is this sufficient time, or should she wait until 3 weeks after Prolia injection?

## 2023-09-11 ENCOUNTER — TELEPHONE (OUTPATIENT)
Dept: ENDOCRINOLOGY | Age: 83
End: 2023-09-11

## 2023-09-26 LAB
A/G RATIO: 1.5 (ref 1.2–2.2)
ALBUMIN SERPL-MCNC: 4.3 G/DL (ref 3.7–4.7)
ALP BLD-CCNC: 60 IU/L (ref 44–121)
ALT SERPL-CCNC: 39 IU/L (ref 0–32)
AST SERPL-CCNC: 43 IU/L (ref 0–40)
BILIRUB SERPL-MCNC: 0.7 MG/DL (ref 0–1.2)
BUN / CREAT RATIO: 18 (ref 12–28)
BUN BLDV-MCNC: 19 MG/DL (ref 8–27)
CALCIUM SERPL-MCNC: 9.8 MG/DL (ref 8.7–10.3)
CHLORIDE BLD-SCNC: 103 MMOL/L (ref 96–106)
CO2: 22 MMOL/L (ref 20–29)
CREAT SERPL-MCNC: 1.05 MG/DL (ref 0.57–1)
ESTIMATED GLOMERULAR FILTRATION RATE CREATININE EQUATION: 52 ML/MIN/1.73
GLOBULIN: 2.8 G/DL (ref 1.5–4.5)
GLUCOSE BLD-MCNC: 94 MG/DL (ref 70–99)
POTASSIUM SERPL-SCNC: 4.2 MMOL/L (ref 3.5–5.2)
SODIUM BLD-SCNC: 141 MMOL/L (ref 134–144)
T4 FREE: 1.15 NG/DL (ref 0.82–1.77)
TOTAL PROTEIN: 7.1 G/DL (ref 6–8.5)
TSH SERPL DL<=0.05 MIU/L-ACNC: 3.37 UIU/ML (ref 0.45–4.5)
VITAMIN D 25-HYDROXY: 44.1 NG/ML (ref 30–100)

## 2023-10-04 ENCOUNTER — NURSE ONLY (OUTPATIENT)
Dept: ENDOCRINOLOGY | Age: 83
End: 2023-10-04
Payer: MEDICARE

## 2023-10-04 ENCOUNTER — OFFICE VISIT (OUTPATIENT)
Dept: ENDOCRINOLOGY | Age: 83
End: 2023-10-04
Payer: MEDICARE

## 2023-10-04 VITALS
HEIGHT: 62 IN | HEART RATE: 80 BPM | WEIGHT: 124 LBS | DIASTOLIC BLOOD PRESSURE: 71 MMHG | RESPIRATION RATE: 14 BRPM | SYSTOLIC BLOOD PRESSURE: 130 MMHG | BODY MASS INDEX: 22.82 KG/M2 | TEMPERATURE: 98 F

## 2023-10-04 DIAGNOSIS — G45.9 TIA (TRANSIENT ISCHEMIC ATTACK): ICD-10-CM

## 2023-10-04 DIAGNOSIS — E78.2 MIXED HYPERLIPIDEMIA: Chronic | ICD-10-CM

## 2023-10-04 DIAGNOSIS — M81.0 AGE-RELATED OSTEOPOROSIS WITHOUT CURRENT PATHOLOGICAL FRACTURE: Primary | ICD-10-CM

## 2023-10-04 PROCEDURE — 1123F ACP DISCUSS/DSCN MKR DOCD: CPT | Performed by: INTERNAL MEDICINE

## 2023-10-04 PROCEDURE — 99214 OFFICE O/P EST MOD 30 MIN: CPT | Performed by: INTERNAL MEDICINE

## 2023-10-04 PROCEDURE — 96372 THER/PROPH/DIAG INJ SC/IM: CPT | Performed by: INTERNAL MEDICINE

## 2023-10-04 PROCEDURE — G8427 DOCREV CUR MEDS BY ELIG CLIN: HCPCS | Performed by: INTERNAL MEDICINE

## 2023-10-04 PROCEDURE — 1090F PRES/ABSN URINE INCON ASSESS: CPT | Performed by: INTERNAL MEDICINE

## 2023-10-04 PROCEDURE — 3075F SYST BP GE 130 - 139MM HG: CPT | Performed by: INTERNAL MEDICINE

## 2023-10-04 PROCEDURE — G8399 PT W/DXA RESULTS DOCUMENT: HCPCS | Performed by: INTERNAL MEDICINE

## 2023-10-04 PROCEDURE — 1036F TOBACCO NON-USER: CPT | Performed by: INTERNAL MEDICINE

## 2023-10-04 PROCEDURE — 3078F DIAST BP <80 MM HG: CPT | Performed by: INTERNAL MEDICINE

## 2023-10-04 PROCEDURE — G8484 FLU IMMUNIZE NO ADMIN: HCPCS | Performed by: INTERNAL MEDICINE

## 2023-10-04 PROCEDURE — G8420 CALC BMI NORM PARAMETERS: HCPCS | Performed by: INTERNAL MEDICINE

## 2023-10-04 RX ORDER — CALCITRIOL 0.25 UG/1
0.25 CAPSULE, LIQUID FILLED ORAL DAILY
Qty: 90 CAPSULE | Refills: 3 | Status: SHIPPED | OUTPATIENT
Start: 2023-10-04

## 2023-10-04 RX ORDER — ATORVASTATIN CALCIUM 40 MG/1
TABLET, FILM COATED ORAL
COMMUNITY
Start: 2023-09-06

## 2023-10-04 NOTE — PROGRESS NOTES
IUs daily      Elevated creatinine since September 2019  EGFR is still above 40 patient and her son was advised that if creatinine worsens we might have to adjust her therapy for osteoporosis as well. --I reviewed the results of her recent kidney function test and advised her to continue with adequate hydration. History of TIA she is on Eliquis    Return in about 6 months (around 4/4/2024).

## 2023-11-01 NOTE — PROGRESS NOTES
Cardiac Follow Up    Referring Provider:  José Shahid       Chief Complaint   Patient presents with    Hypertension    Hyperlipidemia       History of Present Illness:  Latrell Noel is a 78 y.o. female. 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 Ann Dunn in hematology. She also has a history of TIA, and hyperlipidemia. In 1996, she had an episode of blurry eye sight with hand numbness that was diagnosed as a complicated migraine. She had an echo with bubble study April 4, 2014 - LV normal with LVEF 89-46%, diastolic dysfunction, trace MR, and moderate TR. Latrell Noel was admitted to [facility] 10/30/14 with slurred speech, difficulty finding words, and transient headaches. She was transferred to [facility] and evaluated by oncology, neurology, and cardiology. She thinks the cause for symptoms was likely because of sub therapeutic INR and TIA. Omaira Kelly She had an echo with bubble study 1/18/2016 that did not document an ASD. In 2015 she fell and fractured her hip and had surgery. Latrell Noel reports having TIA's from time to time. In June 2016, she was on the phone with her son who lives in [place]., and she couldn't put two words together. She ended up being admitted to [facility] for three days with a TIA. She was on coumadin at the time with therapeutic INR's. Her neurologist is Dr. Dave Yu. ARIN done in 12/27/16 showed no evidence of shunting, mild MR. Due to failure of coumadin she was switched to xarelto. Stopped Xarelto and changed to Eliquis 2.5mg bid due to change in Kidney function. - Kidney function much improved. Was seen at [facility] in July 2021 for syncopal episode witnessed by her son. Her workup was unremarkable. Likely TIA, no residual effects. She has since been seen for memory loss. She is active with her Alevism, she sings and she participates in [employer] study. 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  Her new cat is

## 2023-11-21 ENCOUNTER — OFFICE VISIT (OUTPATIENT)
Dept: CARDIOLOGY CLINIC | Age: 83
End: 2023-11-21
Payer: MEDICARE

## 2023-11-21 VITALS
HEART RATE: 77 BPM | SYSTOLIC BLOOD PRESSURE: 110 MMHG | BODY MASS INDEX: 23.19 KG/M2 | OXYGEN SATURATION: 95 % | HEIGHT: 62 IN | DIASTOLIC BLOOD PRESSURE: 80 MMHG | WEIGHT: 126 LBS

## 2023-11-21 DIAGNOSIS — E78.2 MIXED HYPERLIPIDEMIA: ICD-10-CM

## 2023-11-21 DIAGNOSIS — R55 SYNCOPE, UNSPECIFIED SYNCOPE TYPE: ICD-10-CM

## 2023-11-21 DIAGNOSIS — D68.51 FACTOR V LEIDEN (HCC): ICD-10-CM

## 2023-11-21 DIAGNOSIS — I10 HTN (HYPERTENSION), BENIGN: Primary | ICD-10-CM

## 2023-11-21 DIAGNOSIS — I65.23 BILATERAL CAROTID ARTERY STENOSIS: ICD-10-CM

## 2023-11-21 PROCEDURE — 99214 OFFICE O/P EST MOD 30 MIN: CPT | Performed by: INTERNAL MEDICINE

## 2023-11-21 PROCEDURE — 3078F DIAST BP <80 MM HG: CPT | Performed by: INTERNAL MEDICINE

## 2023-11-21 PROCEDURE — G8420 CALC BMI NORM PARAMETERS: HCPCS | Performed by: INTERNAL MEDICINE

## 2023-11-21 PROCEDURE — 1123F ACP DISCUSS/DSCN MKR DOCD: CPT | Performed by: INTERNAL MEDICINE

## 2023-11-21 PROCEDURE — G8427 DOCREV CUR MEDS BY ELIG CLIN: HCPCS | Performed by: INTERNAL MEDICINE

## 2023-11-21 PROCEDURE — G8484 FLU IMMUNIZE NO ADMIN: HCPCS | Performed by: INTERNAL MEDICINE

## 2023-11-21 PROCEDURE — 1036F TOBACCO NON-USER: CPT | Performed by: INTERNAL MEDICINE

## 2023-11-21 PROCEDURE — 3074F SYST BP LT 130 MM HG: CPT | Performed by: INTERNAL MEDICINE

## 2023-11-21 PROCEDURE — 1090F PRES/ABSN URINE INCON ASSESS: CPT | Performed by: INTERNAL MEDICINE

## 2023-11-21 PROCEDURE — G8399 PT W/DXA RESULTS DOCUMENT: HCPCS | Performed by: INTERNAL MEDICINE

## 2023-11-21 RX ORDER — ROPINIROLE 1 MG/1
TABLET, FILM COATED ORAL
COMMUNITY
Start: 2023-11-20

## 2023-11-21 NOTE — PATIENT INSTRUCTIONS
Cardiac test and lab results personally reviewed by me during this office visit and discussed. Continue risk factor modifications. Call for any change in symptoms, call to report any changes in shortness of breath or development of chest pain with activity.     Follow up in 6 mos with echo

## 2024-01-03 DIAGNOSIS — I65.23 BILATERAL CAROTID ARTERY STENOSIS: ICD-10-CM

## 2024-01-03 DIAGNOSIS — D68.51 FACTOR V LEIDEN (HCC): ICD-10-CM

## 2024-01-03 RX ORDER — APIXABAN 2.5 MG/1
TABLET, FILM COATED ORAL
Qty: 180 TABLET | Refills: 3 | Status: SHIPPED | OUTPATIENT
Start: 2024-01-03

## 2024-01-03 NOTE — TELEPHONE ENCOUNTER
Received refill request for Eliquis from [facility] pharmacy.    Last ov: 11/21/2023 LES    Last Refill: 12/14/2022 #180 w/ 3 refills    Next appointment: 05/28/2024 LES

## 2024-01-22 ENCOUNTER — TELEPHONE (OUTPATIENT)
Dept: CARDIOLOGY CLINIC | Age: 84
End: 2024-01-22

## 2024-01-22 DIAGNOSIS — D68.51 FACTOR V LEIDEN (HCC): ICD-10-CM

## 2024-01-22 DIAGNOSIS — R55 SYNCOPE, UNSPECIFIED SYNCOPE TYPE: Primary | ICD-10-CM

## 2024-01-22 NOTE — TELEPHONE ENCOUNTER
Called patient's home and reached her son who is out of state.  Patient may be discharged today and may be going to a Nursing home.  Patient's son will call back later today, he said patient fractured her pelvis and he is not sure about scheduling an appointment.  buddy

## 2024-01-22 NOTE — TELEPHONE ENCOUNTER
Per LES, recommend hospital FU apt end of next week with [facility] the day before (order placed).     Ok to schedule pt on 2/2/24 in [place] during any open/available apt.

## 2024-01-22 NOTE — TELEPHONE ENCOUNTER
Pt's son is calling to inform us that the nursing facility will be calling us to schedule her appointment.    The ortho dr wants to see her this week,and the hematologist wants to see her also. Pt's son wanted to check with LES to see if he wants to see her instead since he controls his mom's eliquis.

## 2024-01-29 ENCOUNTER — TELEPHONE (OUTPATIENT)
Dept: CARDIOLOGY CLINIC | Age: 84
End: 2024-01-29

## 2024-01-29 NOTE — PROGRESS NOTES
Cardiac Follow Up    Referring Provider:  Leeanna Silva       Chief Complaint   Patient presents with    Hyperlipidemia    Hypertension    Follow-Up from Hospital       History of Present Illness:  Bibiana is a 83 y.o. female. 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 Kennedy in hematology. She also has a history of TIA, and hyperlipidemia.  In 1996, she had an episode of blurry eye sight with hand numbness that was diagnosed as a complicated migraine. She had an echo with bubble study April 4, 2014 - LV normal with LVEF 55-60%, diastolic dysfunction, trace MR, and moderate TR.  Bibiana was admitted to [facility] 10/30/14 with slurred speech, difficulty finding words, and transient headaches. She was transferred to [facility] and evaluated by oncology, neurology, and cardiology.  She thinks the cause for symptoms was likely because of sub therapeutic INR and TIA.  . She had an echo with bubble study 1/18/2016 that did not document an ASD.   In 2015 she fell and fractured her hip and had surgery.  Bibiana reports having TIA's from time to time. In June 2016, she was on the phone with her son who lives in [facility], and she couldn't put two words together. She ended up being admitted to [facility] hospital for three days with a TIA.  She was on coumadin at the time with therapeutic INR's. Her neurologist is Dr. Soni.  ARIN done in 12/27/16 showed no evidence of shunting, mild MR.Due to failure of coumadin she was switched to xarelto.  Stopped Xarelto and changed to Eliquis 2.5mg bid due to change in Kidney function. - Kidney function much improved.   Was seen at [facility] in July 2021 for syncopal episode witnessed by her son. Her workup was unremarkable. Likely TIA, no residual effects. She has since been seen for memory loss.     She is active with her Synagogue, she sings and she participates in [employer] study. She has a weekly hair appointment and still drives a

## 2024-01-29 NOTE — TELEPHONE ENCOUNTER
Elaina said pt was taken off Eliquis when she was in the hospital and is ready to start taking it again. Son wants to make sure that LES is ok with her taking it first. Also, asking if she needs to continue to follow with infusion center. Please call Elaina to discuss.

## 2024-02-02 ENCOUNTER — OFFICE VISIT (OUTPATIENT)
Dept: CARDIOLOGY CLINIC | Age: 84
End: 2024-02-02
Payer: MEDICARE

## 2024-02-02 VITALS
BODY MASS INDEX: 23.19 KG/M2 | SYSTOLIC BLOOD PRESSURE: 130 MMHG | OXYGEN SATURATION: 93 % | HEIGHT: 62 IN | WEIGHT: 126 LBS | DIASTOLIC BLOOD PRESSURE: 62 MMHG | HEART RATE: 86 BPM

## 2024-02-02 DIAGNOSIS — I65.23 BILATERAL CAROTID ARTERY STENOSIS: ICD-10-CM

## 2024-02-02 DIAGNOSIS — D68.51 FACTOR V LEIDEN (HCC): ICD-10-CM

## 2024-02-02 DIAGNOSIS — I10 HTN (HYPERTENSION), BENIGN: Primary | ICD-10-CM

## 2024-02-02 DIAGNOSIS — R55 SYNCOPE, UNSPECIFIED SYNCOPE TYPE: ICD-10-CM

## 2024-02-02 DIAGNOSIS — E78.2 MIXED HYPERLIPIDEMIA: ICD-10-CM

## 2024-02-02 PROCEDURE — 1036F TOBACCO NON-USER: CPT | Performed by: INTERNAL MEDICINE

## 2024-02-02 PROCEDURE — 99214 OFFICE O/P EST MOD 30 MIN: CPT | Performed by: INTERNAL MEDICINE

## 2024-02-02 PROCEDURE — G8427 DOCREV CUR MEDS BY ELIG CLIN: HCPCS | Performed by: INTERNAL MEDICINE

## 2024-02-02 PROCEDURE — 1123F ACP DISCUSS/DSCN MKR DOCD: CPT | Performed by: INTERNAL MEDICINE

## 2024-02-02 PROCEDURE — 1090F PRES/ABSN URINE INCON ASSESS: CPT | Performed by: INTERNAL MEDICINE

## 2024-02-02 PROCEDURE — G8484 FLU IMMUNIZE NO ADMIN: HCPCS | Performed by: INTERNAL MEDICINE

## 2024-02-02 PROCEDURE — G8420 CALC BMI NORM PARAMETERS: HCPCS | Performed by: INTERNAL MEDICINE

## 2024-02-02 PROCEDURE — G8399 PT W/DXA RESULTS DOCUMENT: HCPCS | Performed by: INTERNAL MEDICINE

## 2024-02-02 PROCEDURE — 3078F DIAST BP <80 MM HG: CPT | Performed by: INTERNAL MEDICINE

## 2024-02-02 PROCEDURE — 3075F SYST BP GE 130 - 139MM HG: CPT | Performed by: INTERNAL MEDICINE

## 2024-02-02 RX ORDER — POLYETHYLENE GLYCOL 3350 17 G/17G
17 POWDER, FOR SOLUTION ORAL DAILY PRN
COMMUNITY
Start: 2024-01-22

## 2024-02-02 RX ORDER — AMOXICILLIN 250 MG
1 CAPSULE ORAL DAILY
COMMUNITY
Start: 2024-01-22

## 2024-02-02 RX ORDER — FERROUS SULFATE 325(65) MG
325 TABLET ORAL
COMMUNITY
Start: 2024-01-22

## 2024-02-02 RX ORDER — ACETAMINOPHEN 500 MG
500 TABLET ORAL EVERY 6 HOURS
COMMUNITY
Start: 2024-01-22 | End: 2024-02-21

## 2024-02-02 NOTE — PATIENT INSTRUCTIONS
Would be ok to clear for wrist surgery if necessary  Ok to hold Eliquis for 3 days prior to wrist surgery, if surgery is necessary and resume it when safe, per surgeon  No medication changes  Continue risk factor modifications.   Call for any change in symptoms, call to report any changes in shortness of breath or development of chest pain with activity.    Follow up in 3 mos

## 2024-02-05 ENCOUNTER — TELEPHONE (OUTPATIENT)
Dept: CARDIOLOGY CLINIC | Age: 84
End: 2024-02-05

## 2024-02-05 NOTE — TELEPHONE ENCOUNTER
[facility] called wanting to clarify the Eliquis, they have 2.5 bid. However when the  was DC for hospital she came there 5 mg BID and family wanted [place] to confirm with LES.     Pls advise thank you

## 2024-02-06 NOTE — TELEPHONE ENCOUNTER
Patient's son phoned about her possibly being discharged on Friday and he wonders since the Eliquis was stopped and then doubled if she should stay for observation until her medication levels out.  Please call advise.  Thank you

## 2024-02-26 ENCOUNTER — TELEPHONE (OUTPATIENT)
Dept: CARDIOLOGY CLINIC | Age: 84
End: 2024-02-26

## 2024-02-26 NOTE — TELEPHONE ENCOUNTER
Son is requesting a letter from LES that pt does not have cognitive ability to handle her finances. Son states BRAYDON knows pt has been seeing Dr Hernández for memory disorders. Son states pt was released from nursing home on Saturday and [facility] asked pt 5 questions such as who the president is, what day is it and was not able to answer. Please call telma Clark to discuss further and let him know if a letter can be written.

## 2024-02-27 ENCOUNTER — TELEPHONE (OUTPATIENT)
Dept: ENDOCRINOLOGY | Age: 84
End: 2024-02-27

## 2024-02-27 NOTE — TELEPHONE ENCOUNTER
I called and spoke to Eduardo to notify that the letter was ready.  He requested for it to me emailed to him at [email or web address].      Letter sent via email per request.

## 2024-02-27 NOTE — TELEPHONE ENCOUNTER
Pt son asking for a call back stating pt his mother received a bill for lab orders that were sent for pt pt's son says pt went to [facility] in [place] had the labs done pt's son stating the codes were put in wrong an pt should not have a bill       Bob [phone]

## 2024-02-27 NOTE — TELEPHONE ENCOUNTER
BRAYDON called and spoke to earnest Clark to create letter.      Letter created, printed and signed.

## 2024-02-27 NOTE — TELEPHONE ENCOUNTER
Please compose a letter that Ms Ayala is unable to handle financial matters due to her cognitive decline   Fall with [facility]

## 2024-02-28 NOTE — TELEPHONE ENCOUNTER
Patient's son was informed that he should contact [employer] to have a form sent to our office to have diagnosis codes corrected. Patient's son states \"This is the 40th screw up from your office and I need you all to get it together\". I apologized and we disconnected the call.

## 2024-02-28 NOTE — TELEPHONE ENCOUNTER
Pt son called back and said \"listen to me, my mother has 12 doctors offices and this is by far the worst one and you can't do anything right, I don't need to do your leg work and billing for you\" I said sir I'm sorry this is the first I'm hearing of this and asked him to please not be disrespectful to me. [facility].

## 2024-02-28 NOTE — TELEPHONE ENCOUNTER
Can someone call [facility] and find out what the problem is if it is a diagnosis code issue we can take care of that

## 2024-03-01 NOTE — TELEPHONE ENCOUNTER
Spoke to Sola at [facility] regarding billing issue ICD 10 code corrected for the Vit D order. ICD code given to lab. Can take up to 30-45 days to get response from insurance.

## 2024-03-12 ENCOUNTER — TELEPHONE (OUTPATIENT)
Dept: ENDOCRINOLOGY | Age: 84
End: 2024-03-12

## 2024-03-28 ENCOUNTER — HOSPITAL ENCOUNTER (OUTPATIENT)
Dept: PHYSICAL THERAPY | Age: 84
Setting detail: THERAPIES SERIES
Discharge: HOME | End: 2024-03-28
Payer: MEDICARE

## 2024-03-28 PROCEDURE — 97162 PT EVAL MOD COMPLEX 30 MIN: CPT

## 2024-03-28 NOTE — PLAN OF CARE
AD    Special Tests:   N/A    Balance:  [] WNL      [x] NT       [] Dysfunction noted  Comment:     Falls Risk Assessment (30 days):   Falls Risk assessed and no intervention required.  Time Up and Go (TUG):   Not Assessed        Exercises/Interventions     Therapeutic Ex (91772)  resistance Sets/time Reps Notes/Cues/Progressions   Wrist extensor stretch  1 10    AROM wrist ext/flex  1 10    AROM wrist ulnar/radial deviation  1 10    Ball squeeze  1 10                                              Manual Intervention (11250)  TIME                                        NMR re-education (28818) resistance Sets/time Reps CUES NEEDED                                      Therapeutic Activity (86523)  Sets/time                                          Modalities:    No modalities applied this session    Education/Home Exercise Program: Patient HEP program created electronically.  Refer to [address] access code: [email or web address]        ASSESSMENT   Assessment:   Bibiana Ayala is a 83 y.o. female presenting today to Outpatient PT with signs and symptoms consistent with R wrist fracture and pelvic fracture.    Pt. presents with the functional impairments and activity limitations listed below and would benefit from Outpatient PT to address the below impairments as well as improve pain, and restore function.     Functional Impairments:   Decreased UE functional ROM  Decreased UE functional strength  Decreased LE functional ROM  Decreased LE functional strength     Functional Activity Limitations (from functional questionnaire and intake):  Pushing up on your hands (eg, from bathtub or chair)  Preparing food (eg, peeling, cutting)  Opening doors  Opening a jar  Carrying a small suitcase with your affected limb   Reduced ability to squat  Reduced ability to ambulate prolonged functional periods/distances/surfaces  Reduced ability to ascend/descend stairs    Participation Restrictions:   Reduced participation in self care  Reduced

## 2024-04-01 ENCOUNTER — HOSPITAL ENCOUNTER (OUTPATIENT)
Dept: PHYSICAL THERAPY | Age: 84
Setting detail: THERAPIES SERIES
Discharge: HOME | End: 2024-04-01
Payer: MEDICARE

## 2024-04-01 PROCEDURE — 97140 MANUAL THERAPY 1/> REGIONS: CPT

## 2024-04-01 PROCEDURE — 97110 THERAPEUTIC EXERCISES: CPT

## 2024-04-01 NOTE — FLOWSHEET NOTE
a corresponding ICD-10 code that is of complexity and severity that require skilled therapeutic intervention. This has a direct and significant impact on the need for therapy and significantly impacts the rate of recovery.     Return to Play: NA    Prognosis for POC: [x] Good [] Fair  [] Poor    Patient requires continued skilled intervention: [x] Yes  [] No      CHARGE CAPTURE     PT CHARGE GRID   CPT Code (TIMED) minutes # CPT Code (UNTIMED) #     Therex (46085)  15 1  EVAL:MODERATE (81597 - Typically 30 minutes face-to-face)     Neuromusc. Re-ed (97753)    Re-Eval (37204)     Manual (85705) 23 2  Estim Unattended (16048)     Ther. Act (01173)    Mech. Traction (51790)     Gait (04659)    Dry Needle 1-2 muscle (20560)     Aquatic Therex (36597)    Dry Needle 3+ muscle (20561)     Iontophoresis (77496)    VASO (01768)     Ultrasound (94538)    Group Therapy (48234)     Estim Attended (38831)    Canalith Repositioning (80466)     Other:    Other:    Total Timed Code Tx Minutes 38 3       Total Treatment Minutes 38        Charge Justification:  (52735) THERAPEUTIC EXERCISE - Provided verbal/tactile cueing for activities related to strengthening, flexibility, endurance, ROM performed to prevent loss of range of motion, maintain or improve muscular strength or increase flexibility, following either an injury or surgery.   (32257) MANUAL THERAPY -  Manual therapy techniques, 1 or more regions, each 15 minutes (Mobilization/manipulation, manual lymphatic drainage, manual traction) for the purpose of modulating pain, promoting relaxation,  increasing ROM, reducing/eliminating soft tissue swelling/inflammation/restriction, improving soft tissue extensibility and allowing for proper ROM for normal function with self care, mobility, lifting and ambulation  N/A - EVAL ONLY      GOALS     Patient stated goal: use hand, walk right  Status: [] Progressing: [] Met: [] Not Met: [] Adjusted    Therapist goals for Patient:   Short

## 2024-04-04 ENCOUNTER — TELEPHONE (OUTPATIENT)
Dept: ENDOCRINOLOGY | Age: 84
End: 2024-04-04

## 2024-04-04 ENCOUNTER — HOSPITAL ENCOUNTER (OUTPATIENT)
Dept: PHYSICAL THERAPY | Age: 84
Setting detail: THERAPIES SERIES
Discharge: HOME | End: 2024-04-04
Payer: MEDICARE

## 2024-04-04 PROCEDURE — 97140 MANUAL THERAPY 1/> REGIONS: CPT

## 2024-04-04 PROCEDURE — 97110 THERAPEUTIC EXERCISES: CPT

## 2024-04-04 NOTE — FLOWSHEET NOTE
[facility] - Outpatient Rehabilitation and Therapy [address] [place] [address] office: [phone] fax: [phone]      Physical Therapy: TREATMENT/PROGRESS NOTE   Patient: Bibiana Ayala (83 y.o. female)   Examination Date: 2024   :  1940 MRN: [id number]   Visit #: 3   Insurance Allowable Auth Needed   Medicare/Humana []Yes    [x]No    Insurance: Payor: MEDICARE / Plan: MEDICARE PART A AND B / Product Type: *No Product type* /   Insurance ID: [id number] - (Medicare)  Secondary Insurance (if applicable): HUMANA   Treatment Diagnosis: Multiple fractures of pelvis with stable disruption of pelvic ring, initial encounter for closed fracture [S32.810A], other intraarticular fracture of lower end of R radius S52.571A, unspecified fracture of lower end of R radius S52.501D, R wrist pain M25.531   Medical Diagnosis: other intraarticular fracture of lower end of R radius S52.571A, unspecified fracture of lower end of R radius S52.501D, R wrist pain M25.531  Multiple fractures of pelvis with stable disruption of pelvic ring, initial encounter for closed fracture [S32.810A]   Referring Physician: Pino Bettencourt DO  PCP: Leeanna Silva     SON STEFANIE AYALA [phone]      Plan of care signed (Y/N):     Date of Patient follow up with Physician:      Progress Report/POC: NO  POC update due: (10 visits /OR AUTH LIMITS, whichever is less)  5/3/2024                                             Precautions/ Contra-indications:           Latex allergy:  NO  Pacemaker:    NO  Contraindications for Manipulation: None  Date of Surgery: n/a  Other:    Red Flags:  None    C-SSRS Triggered by Intake questionnaire:   [x] No, Questionnaire did not trigger screening.   [] Yes, Patient intake triggered further evaluation      [] C-SSRS Screening completed  [] PCP notified via Plan of Care  [] Emergency services notified     Preferred Language for Healthcare:   [x] English       []

## 2024-04-08 ENCOUNTER — APPOINTMENT (OUTPATIENT)
Dept: PHYSICAL THERAPY | Age: 84
End: 2024-04-08
Payer: MEDICARE

## 2024-04-09 ENCOUNTER — HOSPITAL ENCOUNTER (OUTPATIENT)
Dept: PHYSICAL THERAPY | Age: 84
Setting detail: THERAPIES SERIES
Discharge: HOME | End: 2024-04-09
Payer: MEDICARE

## 2024-04-09 PROCEDURE — 97140 MANUAL THERAPY 1/> REGIONS: CPT

## 2024-04-09 PROCEDURE — 97110 THERAPEUTIC EXERCISES: CPT

## 2024-04-09 NOTE — FLOWSHEET NOTE
[facility] - Outpatient Rehabilitation and Therapy [address] [place] [address] office: [phone] fax: [phone]      Physical Therapy: TREATMENT/PROGRESS NOTE   Patient: Bibiana Ayala (83 y.o. female)   Examination Date: 2024   :  1940 MRN: [id number]   Visit #: 4   Insurance Allowable Auth Needed   Medicare/Humana []Yes    [x]No    Insurance: Payor: MEDICARE / Plan: MEDICARE PART A AND B / Product Type: *No Product type* /   Insurance ID: [id number] - (Medicare)  Secondary Insurance (if applicable): HUMANA   Treatment Diagnosis: Multiple fractures of pelvis with stable disruption of pelvic ring, initial encounter for closed fracture [S32.810A], other intraarticular fracture of lower end of R radius S52.571A, unspecified fracture of lower end of R radius S52.501D, R wrist pain M25.531   Medical Diagnosis: other intraarticular fracture of lower end of R radius S52.571A, unspecified fracture of lower end of R radius S52.501D, R wrist pain M25.531  Multiple fractures of pelvis with stable disruption of pelvic ring, initial encounter for closed fracture [S32.810A]   Referring Physician: Pino Bettencourt DO  PCP: Leeanna Silva     SON STEFANIE AYALA [phone]      Plan of care signed (Y/N):     Date of Patient follow up with Physician:      Progress Report/POC: NO  POC update due: (10 visits /OR AUTH LIMITS, whichever is less)  2024                                             Precautions/ Contra-indications:           Latex allergy:  NO  Pacemaker:    NO  Contraindications for Manipulation: None  Date of Surgery: n/a  Other:    Red Flags:  None    C-SSRS Triggered by Intake questionnaire:   [x] No, Questionnaire did not trigger screening.   [] Yes, Patient intake triggered further evaluation      [] C-SSRS Screening completed  [] PCP notified via Plan of Care  [] Emergency services notified     Preferred Language for Healthcare:   [x] English       []

## 2024-04-11 ENCOUNTER — HOSPITAL ENCOUNTER (OUTPATIENT)
Dept: PHYSICAL THERAPY | Age: 84
Setting detail: THERAPIES SERIES
Discharge: HOME | End: 2024-04-11
Payer: MEDICARE

## 2024-04-11 PROCEDURE — 97110 THERAPEUTIC EXERCISES: CPT

## 2024-04-11 PROCEDURE — 97140 MANUAL THERAPY 1/> REGIONS: CPT

## 2024-04-11 NOTE — FLOWSHEET NOTE
[facility] - Outpatient Rehabilitation and Therapy [address] [place] [address] office: [phone] fax: [phone]      Physical Therapy: TREATMENT/PROGRESS NOTE   Patient: Bibiana Ayala (83 y.o. female)   Examination Date: 2024   :  1940 MRN: [id number]   Visit #: 5   Insurance Allowable Auth Needed   Medicare/Humana []Yes    [x]No    Insurance: Payor: MEDICARE / Plan: MEDICARE PART A AND B / Product Type: *No Product type* /   Insurance ID: [id number] - (Medicare)  Secondary Insurance (if applicable): HUMANA   Treatment Diagnosis: Multiple fractures of pelvis with stable disruption of pelvic ring, initial encounter for closed fracture [S32.810A], other intraarticular fracture of lower end of R radius S52.571A, unspecified fracture of lower end of R radius S52.501D, R wrist pain M25.531   Medical Diagnosis: other intraarticular fracture of lower end of R radius S52.571A, unspecified fracture of lower end of R radius S52.501D, R wrist pain M25.531  Multiple fractures of pelvis with stable disruption of pelvic ring, initial encounter for closed fracture [S32.810A]   Referring Physician: Pino Bettencourt DO  PCP: Leeanna Silva     SON STEFANIE AYALA [phone]      Plan of care signed (Y/N):     Date of Patient follow up with Physician:      Progress Report/POC: NO  POC update due: (10 visits /OR AUTH LIMITS, whichever is less)  5/10/2024                                             Precautions/ Contra-indications:           Latex allergy:  NO  Pacemaker:    NO  Contraindications for Manipulation: None  Date of Surgery: n/a  Other:    Red Flags:  None    C-SSRS Triggered by Intake questionnaire:   [x] No, Questionnaire did not trigger screening.   [] Yes, Patient intake triggered further evaluation      [] C-SSRS Screening completed  [] PCP notified via Plan of Care  [] Emergency services notified     Preferred Language for Healthcare:   [x] English       []

## 2024-04-15 ENCOUNTER — HOSPITAL ENCOUNTER (OUTPATIENT)
Dept: PHYSICAL THERAPY | Age: 84
Setting detail: THERAPIES SERIES
Discharge: HOME | End: 2024-04-15
Payer: MEDICARE

## 2024-04-15 PROCEDURE — 97110 THERAPEUTIC EXERCISES: CPT

## 2024-04-15 PROCEDURE — 97140 MANUAL THERAPY 1/> REGIONS: CPT

## 2024-04-15 NOTE — FLOWSHEET NOTE
[facility] - Outpatient Rehabilitation and Therapy [address] [place] [address] office: [phone] fax: [phone]      Physical Therapy: TREATMENT/PROGRESS NOTE   Patient: Bibiana Ayala (83 y.o. female)   Examination Date: 04/15/2024   :  1940 MRN: [id number]   Visit #: 6   Insurance Allowable Auth Needed   Medicare/Humana []Yes    [x]No    Insurance: Payor: MEDICARE / Plan: MEDICARE PART A AND B / Product Type: *No Product type* /   Insurance ID: [id number] - (Medicare)  Secondary Insurance (if applicable): HUMANA   Treatment Diagnosis: Multiple fractures of pelvis with stable disruption of pelvic ring, initial encounter for closed fracture [S32.810A], other intraarticular fracture of lower end of R radius S52.571A, unspecified fracture of lower end of R radius S52.501D, R wrist pain M25.531   Medical Diagnosis: other intraarticular fracture of lower end of R radius S52.571A, unspecified fracture of lower end of R radius S52.501D, R wrist pain M25.531  Multiple fractures of pelvis with stable disruption of pelvic ring, initial encounter for closed fracture [S32.810A]   Referring Physician: Pino Bettencourt DO  PCP: Leeanna Silva     SON STEFANIE AYALA [phone]      Plan of care signed (Y/N):     Date of Patient follow up with Physician:      Progress Report/POC: NO  POC update due: (10 visits /OR AUTH LIMITS, whichever is less)  5/15/2024                                             Precautions/ Contra-indications:           Latex allergy:  NO  Pacemaker:    NO  Contraindications for Manipulation: None  Date of Surgery: n/a  Other:    Red Flags:  None    C-SSRS Triggered by Intake questionnaire:   [x] No, Questionnaire did not trigger screening.   [] Yes, Patient intake triggered further evaluation      [] C-SSRS Screening completed  [] PCP notified via Plan of Care  [] Emergency services notified     Preferred Language for Healthcare:   [x] English       []

## 2024-04-17 NOTE — PROGRESS NOTES
valve suggesting impaired left ventricular relaxation.  Severe calcification of the posterior mitral valve annulus.  A bubble study was performed and fails to show evidence of shunting.  Right heart size is borderline enlarged with normal RV function.     Echo 1/19/24  Summary:   Left ventricular wall motion is normal.   Overall left ventricular ejection fraction is estimated to be 60-65%.   Moderate mitral annular calcification.   Aortic valve is mildly calcified.   Right ventricular systolic pressure is normal at <35 mmHg.   Mild tricuspid regurgitation is present.     Reason for Study: syncope.       Assessment:   1. Hypertension:  There were no vitals filed for this visit.    stable   Continue on current medication regimen     2. Hyperlipidemia:   controlled  Remain on Lipitor 40mg daily  3/2/22   HDL 68 LDL 91  9/7/22  TG 81  HDL 69 LDL 93  9/25/23   TG 78  HDL 61  LDL 84      3. Occlusion and stenosis of carotid arteries--TIA (transient ischemic attack):   Event in 2014,  recurrence in June 2016. No recurrence since June. On Xarelto 20 mg.   6/17    Right 1-15%  Left 16-49%  Dopplers 11/27/19> <50% stenosis of the bilateral internal carotid arteries   CTA Neck /5/21> No internal carotid artery stenosis   Continue on daily ASA / statin/ Eliquis     4. 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 Dr. Reyes follows.  Continue on Eliquis 2.5 twice daily     5.  TIA/possible cardiac syncope-          syncopal episode seen at [facility]       7 day cardiac Monitor 7/21> NSR         Continue on aspirin 81mg daily and Eliquis      6 . Hyperglycemia        Per son, she has been significantly limiting her sugar intake.        blood sugar 179 on 3/18/22         A1C 9/7/22 5.9        A1c 9/25/23 5.9        Follows with Dr Quan    Memory loss-   She saw Dr Leo ( memory) in Jan and May with ongoing evaluation and treatment    Plan:   Cardiac test and lab results personally

## 2024-04-18 ENCOUNTER — HOSPITAL ENCOUNTER (OUTPATIENT)
Dept: PHYSICAL THERAPY | Age: 84
Setting detail: THERAPIES SERIES
Discharge: HOME | End: 2024-04-18
Payer: MEDICARE

## 2024-04-18 ENCOUNTER — NURSE ONLY (OUTPATIENT)
Dept: ENDOCRINOLOGY | Age: 84
End: 2024-04-18
Payer: MEDICARE

## 2024-04-18 ENCOUNTER — OFFICE VISIT (OUTPATIENT)
Dept: ENDOCRINOLOGY | Age: 84
End: 2024-04-18

## 2024-04-18 VITALS
DIASTOLIC BLOOD PRESSURE: 63 MMHG | BODY MASS INDEX: 24.29 KG/M2 | HEART RATE: 83 BPM | HEIGHT: 62 IN | SYSTOLIC BLOOD PRESSURE: 126 MMHG | WEIGHT: 132 LBS | RESPIRATION RATE: 16 BRPM

## 2024-04-18 DIAGNOSIS — E55.9 VITAMIN D DEFICIENCY: ICD-10-CM

## 2024-04-18 DIAGNOSIS — M81.0 AGE-RELATED OSTEOPOROSIS WITHOUT CURRENT PATHOLOGICAL FRACTURE: Primary | ICD-10-CM

## 2024-04-18 DIAGNOSIS — M81.0 AGE-RELATED OSTEOPOROSIS WITHOUT CURRENT PATHOLOGICAL FRACTURE: Primary | Chronic | ICD-10-CM

## 2024-04-18 DIAGNOSIS — E78.2 MIXED HYPERLIPIDEMIA: Chronic | ICD-10-CM

## 2024-04-18 PROCEDURE — 97140 MANUAL THERAPY 1/> REGIONS: CPT

## 2024-04-18 PROCEDURE — 96372 THER/PROPH/DIAG INJ SC/IM: CPT | Performed by: INTERNAL MEDICINE

## 2024-04-18 PROCEDURE — 97110 THERAPEUTIC EXERCISES: CPT

## 2024-04-18 NOTE — FLOWSHEET NOTE
improved wrist extension. Added further functional strengthening exercises for the hip today with good overall tolerance and fatigue. Limited in LE strength by pain in L knee that is chronic and unable to tolerate increased weight but otherwise tolerated all interventions well.       Medical Necessity Documentation:  I certify that this patient meets the below criteria necessary for medical necessity for care and/or justification of therapy services:  The patient has functional impairments and/or activity limitations and would benefit from continued outpatient therapy services to address the deficits outlined in the patients goals  The patient has a musculoskeletal condition(s) with a corresponding ICD-10 code that is of complexity and severity that require skilled therapeutic intervention. This has a direct and significant impact on the need for therapy and significantly impacts the rate of recovery.     Return to Play: NA    Prognosis for POC: [x] Good [] Fair  [] Poor    Patient requires continued skilled intervention: [x] Yes  [] No      CHARGE CAPTURE     PT CHARGE GRID   CPT Code (TIMED) minutes # CPT Code (UNTIMED) #     Therex (18429)  28 2  EVAL:MODERATE (94107 - Typically 30 minutes face-to-face)     Neuromusc. Re-ed (62059)    Re-Eval (58712)     Manual (26524) 15 1  Estim Unattended (46949)     Ther. Act (67008)    Mech. Traction (58881)     Gait (76760)    Dry Needle 1-2 muscle (15875)     Aquatic Therex (70716)    Dry Needle 3+ muscle (20561)     Iontophoresis (92335)    VASO (49028)     Ultrasound (75430)    Group Therapy (00685)     Estim Attended (58551)    Canalith Repositioning (89996)     Other:    Other:    Total Timed Code Tx Minutes 43 3       Total Treatment Minutes 43        Charge Justification:  (75872) THERAPEUTIC EXERCISE - Provided verbal/tactile cueing for activities related to strengthening, flexibility, endurance, ROM performed to prevent loss of range of motion, maintain or improve

## 2024-04-18 NOTE — PROGRESS NOTES
the medication and recheck renal panel in 1 week patient was advised to call back so that we can review the results as she gets it done on her primary care's office and sometimes we do not get the results.  She will make sure that we receive the results.    Palpable goiter  Thyroid uls was normal in oct 2021   No further work-up was suggested by radiology     Hyperlipidemia patient is on Lipitor    Vitamin D deficiency she is on Vitamin D supplementation  1000 iu daily she will increase it to 2000 IUs daily      Elevated creatinine since September 2019  EGFR is still above 40 patient and her son was advised that if creatinine worsens we might have to adjust her therapy for osteoporosis as well.  --I reviewed the results of her recent kidney function test and advised her to continue with adequate hydration.    History of TIA she is on Eliquis    Return in about 6 months (around 10/18/2024).

## 2024-04-18 NOTE — PROGRESS NOTES
Patient presents for a prolia injection. Pt denies any prior adverse reactions. Prolia 60 mg given SubQ in the left arm with no complications. Patient tolerated well. Patient to return to office in 6 months for next injection.     Lot: [id number]  Exp: 8/31/2026

## 2024-04-22 ENCOUNTER — HOSPITAL ENCOUNTER (OUTPATIENT)
Dept: PHYSICAL THERAPY | Age: 84
Setting detail: THERAPIES SERIES
Discharge: HOME | End: 2024-04-22
Payer: MEDICARE

## 2024-04-22 PROCEDURE — 97110 THERAPEUTIC EXERCISES: CPT

## 2024-04-22 PROCEDURE — 97140 MANUAL THERAPY 1/> REGIONS: CPT

## 2024-04-22 NOTE — FLOWSHEET NOTE
[facility] - Outpatient Rehabilitation and Therapy [address] [place] [address] office: [phone] fax: [phone]      Physical Therapy: TREATMENT/PROGRESS NOTE   Patient: Bibiana Ayala (83 y.o. female)   Examination Date: 2024   :  1940 MRN: [id number]   Visit #: 8   Insurance Allowable Auth Needed   Medicare/Humana []Yes    [x]No    Insurance: Payor: MEDICARE / Plan: MEDICARE PART A AND B / Product Type: *No Product type* /   Insurance ID: [id number] - (Medicare)  Secondary Insurance (if applicable): HUMANA   Treatment Diagnosis: Multiple fractures of pelvis with stable disruption of pelvic ring, initial encounter for closed fracture [S32.810A], other intraarticular fracture of lower end of R radius S52.571A, unspecified fracture of lower end of R radius S52.501D, R wrist pain M25.531   Medical Diagnosis: other intraarticular fracture of lower end of R radius S52.571A, unspecified fracture of lower end of R radius S52.501D, R wrist pain M25.531  Multiple fractures of pelvis with stable disruption of pelvic ring, initial encounter for closed fracture [S32.810A]   Referring Physician: Pino Bettencourt DO  PCP: Leeanna Silva STEFANIE AYALA [phone]      Plan of care signed (Y/N):     Date of Patient follow up with Physician:      Progress Report/POC: NO  POC update due: (10 visits /OR AUTH LIMITS, whichever is less)  2024                                             Precautions/ Contra-indications:           Latex allergy:  NO  Pacemaker:    NO  Contraindications for Manipulation: None  Date of Surgery: n/a  Other:    Red Flags:  None    C-SSRS Triggered by Intake questionnaire:   [x] No, Questionnaire did not trigger screening.   [] Yes, Patient intake triggered further evaluation      [] C-SSRS Screening completed  [] PCP notified via Plan of Care  [] Emergency services notified     Preferred Language for Healthcare:   [x] English       []

## 2024-04-25 ENCOUNTER — HOSPITAL ENCOUNTER (OUTPATIENT)
Dept: PHYSICAL THERAPY | Age: 84
Setting detail: THERAPIES SERIES
Discharge: HOME | End: 2024-04-25
Payer: MEDICARE

## 2024-04-25 PROCEDURE — 97110 THERAPEUTIC EXERCISES: CPT

## 2024-04-25 PROCEDURE — 97140 MANUAL THERAPY 1/> REGIONS: CPT

## 2024-04-25 NOTE — FLOWSHEET NOTE
[facility] - Outpatient Rehabilitation and Therapy [address] [place] [address] office: [phone] fax: [phone]        Physical Therapy: TREATMENT/PROGRESS NOTE   Patient: Bibiana Ayala (83 y.o. female)   Examination Date: 2024   :  1940 MRN: [id number]   Visit #: 9   Insurance Allowable Auth Needed   Medicare/Humana []Yes    [x]No    Insurance: Payor: MEDICARE / Plan: MEDICARE PART A AND B / Product Type: *No Product type* /   Insurance ID: [id number] - (Medicare)  Secondary Insurance (if applicable): HUMANA   Treatment Diagnosis: Multiple fractures of pelvis with stable disruption of pelvic ring, initial encounter for closed fracture [S32.810A], other intraarticular fracture of lower end of R radius S52.571A, unspecified fracture of lower end of R radius S52.501D, R wrist pain M25.531   Medical Diagnosis: other intraarticular fracture of lower end of R radius S52.571A, unspecified fracture of lower end of R radius S52.501D, R wrist pain M25.531  Multiple fractures of pelvis with stable disruption of pelvic ring, initial encounter for closed fracture [S32.810A]   Referring Physician: Pino Bettencourt DO  PCP: Leeanna Silva MD     THERESE AYALA [phone]      Plan of care signed (Y/N):     Date of Patient follow up with Physician:      Progress Report/POC: NO  POC update due: (10 visits /OR AUTH LIMITS, whichever is less)  2024                                             Precautions/ Contra-indications:           Latex allergy:  NO  Pacemaker:    NO  Contraindications for Manipulation: None  Date of Surgery: n/a  Other:    Red Flags:  None    C-SSRS Triggered by Intake questionnaire:   [x] No, Questionnaire did not trigger screening.   [] Yes, Patient intake triggered further evaluation      [] C-SSRS Screening completed  [] PCP notified via Plan of Care  [] Emergency services notified     Preferred Language for Healthcare:   [x] English       []

## 2024-04-30 ENCOUNTER — HOSPITAL ENCOUNTER (OUTPATIENT)
Dept: PHYSICAL THERAPY | Age: 84
Setting detail: THERAPIES SERIES
Discharge: HOME | End: 2024-04-30
Payer: MEDICARE

## 2024-04-30 PROCEDURE — 97140 MANUAL THERAPY 1/> REGIONS: CPT

## 2024-04-30 PROCEDURE — 97110 THERAPEUTIC EXERCISES: CPT

## 2024-04-30 NOTE — FLOWSHEET NOTE
[facility] - Outpatient Rehabilitation and Therapy [address] [place] [address] office: [phone] fax: [phone]        Physical Therapy: TREATMENT/PROGRESS NOTE   Patient: Bibiana Ayala (83 y.o. female)   Examination Date: 2024   :  1940 MRN: [id number]   Visit #: 10   Insurance Allowable Auth Needed   Medicare/Humana []Yes    [x]No    Insurance: Payor: MEDICARE / Plan: MEDICARE PART A AND B / Product Type: *No Product type* /   Insurance ID: [id number] - (Medicare)  Secondary Insurance (if applicable): HUMANA   Treatment Diagnosis: Multiple fractures of pelvis with stable disruption of pelvic ring, initial encounter for closed fracture [S32.810A], other intraarticular fracture of lower end of R radius S52.571A, unspecified fracture of lower end of R radius S52.501D, R wrist pain M25.531   Medical Diagnosis: other intraarticular fracture of lower end of R radius S52.571A, unspecified fracture of lower end of R radius S52.501D, R wrist pain M25.531  Multiple fractures of pelvis with stable disruption of pelvic ring, initial encounter for closed fracture [S32.810A]   Referring Physician: Pino Bettencourt DO  PCP: Leeanna Silva MD     THERESE AYALA [phone]      Plan of care signed (Y/N):     Date of Patient follow up with Physician:      Progress Report/POC: NO  POC update due: (10 visits /OR AUTH LIMITS, whichever is less)  2024                                             Precautions/ Contra-indications:           Latex allergy:  NO  Pacemaker:    NO  Contraindications for Manipulation: None  Date of Surgery: n/a  Other:    Red Flags:  None    C-SSRS Triggered by Intake questionnaire:   [x] No, Questionnaire did not trigger screening.   [] Yes, Patient intake triggered further evaluation      [] C-SSRS Screening completed  [] PCP notified via Plan of Care  [] Emergency services notified     Preferred Language for Healthcare:   [x] English

## 2024-05-01 ENCOUNTER — OFFICE VISIT (OUTPATIENT)
Dept: CARDIOLOGY CLINIC | Age: 84
End: 2024-05-01
Payer: MEDICARE

## 2024-05-01 VITALS
SYSTOLIC BLOOD PRESSURE: 124 MMHG | HEART RATE: 82 BPM | DIASTOLIC BLOOD PRESSURE: 66 MMHG | WEIGHT: 131 LBS | HEIGHT: 62 IN | BODY MASS INDEX: 24.11 KG/M2 | OXYGEN SATURATION: 97 %

## 2024-05-01 DIAGNOSIS — E78.2 MIXED HYPERLIPIDEMIA: Chronic | ICD-10-CM

## 2024-05-01 DIAGNOSIS — I10 HTN (HYPERTENSION), BENIGN: Primary | ICD-10-CM

## 2024-05-01 DIAGNOSIS — I65.23 BILATERAL CAROTID ARTERY STENOSIS: ICD-10-CM

## 2024-05-01 DIAGNOSIS — D68.51 FACTOR V LEIDEN (HCC): ICD-10-CM

## 2024-05-01 PROCEDURE — 1090F PRES/ABSN URINE INCON ASSESS: CPT | Performed by: INTERNAL MEDICINE

## 2024-05-01 PROCEDURE — 99214 OFFICE O/P EST MOD 30 MIN: CPT | Performed by: INTERNAL MEDICINE

## 2024-05-01 PROCEDURE — 1123F ACP DISCUSS/DSCN MKR DOCD: CPT | Performed by: INTERNAL MEDICINE

## 2024-05-01 PROCEDURE — 93000 ELECTROCARDIOGRAM COMPLETE: CPT | Performed by: INTERNAL MEDICINE

## 2024-05-01 PROCEDURE — 1036F TOBACCO NON-USER: CPT | Performed by: INTERNAL MEDICINE

## 2024-05-01 PROCEDURE — G8399 PT W/DXA RESULTS DOCUMENT: HCPCS | Performed by: INTERNAL MEDICINE

## 2024-05-01 PROCEDURE — 3074F SYST BP LT 130 MM HG: CPT | Performed by: INTERNAL MEDICINE

## 2024-05-01 PROCEDURE — 3078F DIAST BP <80 MM HG: CPT | Performed by: INTERNAL MEDICINE

## 2024-05-01 PROCEDURE — G8427 DOCREV CUR MEDS BY ELIG CLIN: HCPCS | Performed by: INTERNAL MEDICINE

## 2024-05-01 PROCEDURE — G8420 CALC BMI NORM PARAMETERS: HCPCS | Performed by: INTERNAL MEDICINE

## 2024-05-01 RX ORDER — CALCITRIOL 0.25 UG/1
0.25 CAPSULE, LIQUID FILLED ORAL DAILY
COMMUNITY

## 2024-05-01 NOTE — PROGRESS NOTES
concentric left ventricular hypertrophy is present.  Overall left ventricular function is normal.  Ejection fraction is visually estimated to be 55-60%. There is reversal of  E/A inflow velocities across the mitral valve suggesting impaired left ventricular relaxation.  Severe calcification of the posterior mitral valve annulus.  A bubble study was performed and fails to show evidence of shunting.  Right heart size is borderline enlarged with normal RV function.     Echo 1/19/24  Summary:   Left ventricular wall motion is normal.   Overall left ventricular ejection fraction is estimated to be 60-65%.   Moderate mitral annular calcification.   Aortic valve is mildly calcified.   Right ventricular systolic pressure is normal at <35 mmHg.   Mild tricuspid regurgitation is present.     Reason for Study: syncope.       Assessment:   1. Hypertension:  Vitals:    05/01/24 1345   BP: 124/66   Pulse: 82   SpO2: 97%       stable   Remain on current medication regimen     2. Hyperlipidemia:   controlled  3/2/22   HDL 68 LDL 91  9/7/22  TG 81  HDL 69 LDL 93  9/25/23   TG 78  HDL 61  LDL 84   Continue on Lipitor 40 mg daily     3. Occlusion and stenosis of carotid arteries--TIA (transient ischemic attack):   Event in 2014,  recurrence in June 2016. No recurrence since June. On Xarelto 20 mg.   6/17    Right 1-15%  Left 16-49%  Dopplers 11/27/19> <50% stenosis of the bilateral internal carotid arteries   CTA Neck /5/21> No internal carotid artery stenosis   Remain on daily ASA / statin/ Eliquis     4. 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 Dr. Reyes follows.  Continue on Eliquis 2.5 twice daily     5.  TIA/possible cardiac syncope-          syncopal episode seen at [facility]       7 day cardiac Monitor 7/21> NSR         Remain on aspirin 81mg daily and Eliquis      6 . Hyperglycemia        Per son, she has been significantly limiting her sugar intake.        blood sugar 179 on

## 2024-05-02 ENCOUNTER — HOSPITAL ENCOUNTER (OUTPATIENT)
Dept: PHYSICAL THERAPY | Age: 84
Setting detail: THERAPIES SERIES
Discharge: HOME | End: 2024-05-02
Payer: MEDICARE

## 2024-05-02 ENCOUNTER — APPOINTMENT (OUTPATIENT)
Dept: PHYSICAL THERAPY | Age: 84
End: 2024-05-02
Payer: MEDICARE

## 2024-05-02 PROCEDURE — 97140 MANUAL THERAPY 1/> REGIONS: CPT

## 2024-05-02 PROCEDURE — 97110 THERAPEUTIC EXERCISES: CPT

## 2024-05-02 NOTE — PLAN OF CARE
[facility] - Outpatient Rehabilitation and Therapy [address] [place] [address] office: [phone] fax: [phone]        Physical Therapy Re-Certification Plan of Care/MD UPDATE      Dear Dr Bettencourt  ,    We had the pleasure of treating the following patient for physical therapy services at [facility].  A summary of our findings can be found in the updated assessment below.  This includes our plan of care.  If you have any questions or concerns regarding these findings, please do not hesitate to contact me at the office phone number checked above.  Thank you for the referral.     Physician Signature:________________________________Date:__________________  By signing above (or electronic signature), therapist’s plan is approved by physician      Current Functional Status:   Bibiana Ayala 1940 continues to present with functional deficits in ROM, strength symmetry, and endurance of strength  limiting ability with walking on uneven ground, managing community ambulation, carrying items, pushing or pulling activity, and light home activity .  During therapy this date, patient required progression of exercises and program and manual interventions for exercise progression, improving proper muscle recruitment and activation/motor control patterns, and increasing ROM. Patient will continue to benefit from ongoing evaluation and advanced clinical decision from a Physical Therapist to improve ROM, muscle strength, and neuromuscular control to safely return to OF without symptoms or restrictions.    Overall Response to Treatment:   [x]Patient is responding well to treatment and improvement is noted with regards to goals   []Patient should continue to improve in reasonable time if they continue HEP   []Patient has plateaued and is no longer responding to skilled PT intervention    []Patient is getting worse and would benefit from return to referring

## 2024-05-09 ENCOUNTER — HOSPITAL ENCOUNTER (OUTPATIENT)
Dept: PHYSICAL THERAPY | Age: 84
Setting detail: THERAPIES SERIES
Discharge: HOME | End: 2024-05-09
Payer: MEDICARE

## 2024-05-09 PROCEDURE — 97140 MANUAL THERAPY 1/> REGIONS: CPT

## 2024-05-09 PROCEDURE — 97110 THERAPEUTIC EXERCISES: CPT

## 2024-05-09 NOTE — FLOWSHEET NOTE
following either an injury or surgery.   (44129) HOME EXERCISE PROGRAM - Reviewed/Progressed HEP activities related to strengthening, flexibility, endurance, ROM performed to prevent loss of range of motion, maintain or improve muscular strength or increase flexibility, following either an injury or surgery.  (57520) MANUAL THERAPY -  Manual therapy techniques, 1 or more regions, each 15 minutes (Mobilization/manipulation, manual lymphatic drainage, manual traction) for the purpose of modulating pain, promoting relaxation,  increasing ROM, reducing/eliminating soft tissue swelling/inflammation/restriction, improving soft tissue extensibility and allowing for proper ROM for normal function with self care, mobility, lifting and ambulation      GOALS     Patient stated goal: use hand, walk right  Status: [x] Progressing: [] Met: [] Not Met: [] Adjusted    Therapist goals for Patient:   Short Term Goals: To be achieved in: 2 weeks  Independent in HEP and progression per patient tolerance, in order to progress toward full function and prevent re-injury.    Status: [] Progressing: [x] Met: [] Not Met: [] Adjusted  Patient will have a decrease in pain to 1/10 to help facilitate improvement in movement, function, and ADLs as indicated by functional deficits.   Status: [x] Progressing: [] Met: [] Not Met: [] Adjusted    Long Term Goals: To be achieved in: 6 weeks  Disability index score of 15% or less for the LEFS and Quick DASH to assist with return top prior level of function.   Status: [x] Progressing: [] Met: [] Not Met: [] Adjusted  Improve hip AROM to 120 degrees or  better (flexion) and wrist extension to at least 50 degrees to allow patient to push up from chair to allow for proper joint functioning as indicated by patients functional deficits.  Status: [] Progressing: [x] Met: [] Not Met: [] Adjusted  Pt to improve strength to show motor control/activation of proximal hip, wrist flexors, wrist extensors, and

## 2024-05-16 ENCOUNTER — HOSPITAL ENCOUNTER (OUTPATIENT)
Dept: PHYSICAL THERAPY | Age: 84
Setting detail: THERAPIES SERIES
Discharge: HOME | End: 2024-05-16
Payer: MEDICARE

## 2024-05-16 PROCEDURE — 97140 MANUAL THERAPY 1/> REGIONS: CPT

## 2024-05-16 PROCEDURE — 97110 THERAPEUTIC EXERCISES: CPT

## 2024-05-16 NOTE — FLOWSHEET NOTE
[facility] - Outpatient Rehabilitation and Therapy [address] [place] [address] office: [phone] fax: [phone]      Physical Therapy: TREATMENT/PROGRESS NOTE   Patient: Bibiana Ayala (83 y.o. female)   Examination Date: 2024   :  1940 MRN: [id number]   Visit #: 13   Insurance Allowable Auth Needed   Medicare/Humana []Yes    [x]No    Insurance: Payor: MEDICARE / Plan: MEDICARE PART A AND B / Product Type: *No Product type* /   Insurance ID: [id number] - (Medicare)  Secondary Insurance (if applicable): HUMANA   Treatment Diagnosis: Multiple fractures of pelvis with stable disruption of pelvic ring, initial encounter for closed fracture [S32.810A], other intraarticular fracture of lower end of R radius S52.571A, unspecified fracture of lower end of R radius S52.501D, R wrist pain M25.531   Medical Diagnosis: other intraarticular fracture of lower end of R radius S52.571A, unspecified fracture of lower end of R radius S52.501D, R wrist pain M25.531  Multiple fractures of pelvis with stable disruption of pelvic ring, initial encounter for closed fracture [S32.810A]   Referring Physician: Pino Bettencourt DO  PCP: Leeanna Silva MD     THERESE AYALA [phone]      Plan of care signed (Y/N):     Date of Patient follow up with Physician:      Progress Report/POC: NO  POC update due: (10 visits /OR AUTH LIMITS, whichever is less)  2024                                             Precautions/ Contra-indications:           Latex allergy:  NO  Pacemaker:    NO  Contraindications for Manipulation: None  Date of Surgery: n/a  Other:    Red Flags:  None    C-SSRS Triggered by Intake questionnaire:   [x] No, Questionnaire did not trigger screening.   [] Yes, Patient intake triggered further evaluation      [] C-SSRS Screening completed  [] PCP notified via Plan of Care  [] Emergency services notified     Preferred Language for Healthcare:   [x] English       []

## 2024-05-23 ENCOUNTER — HOSPITAL ENCOUNTER (OUTPATIENT)
Dept: PHYSICAL THERAPY | Age: 84
Setting detail: THERAPIES SERIES
Discharge: HOME | End: 2024-05-23
Payer: MEDICARE

## 2024-05-23 ENCOUNTER — TELEPHONE (OUTPATIENT)
Dept: ENDOCRINOLOGY | Age: 84
End: 2024-05-23

## 2024-05-23 PROCEDURE — 97110 THERAPEUTIC EXERCISES: CPT

## 2024-05-23 PROCEDURE — 97140 MANUAL THERAPY 1/> REGIONS: CPT

## 2024-05-23 NOTE — TELEPHONE ENCOUNTER
FYI- Pts son Bob calling to let us know that pt's PCP - Dr Silva will take care of pt for her osteo and Prolia injections and he wanted to \"thank us for everything along the way\"

## 2024-05-23 NOTE — FLOWSHEET NOTE
continued outpatient therapy services to address the deficits outlined in the patients goals  The patient has a musculoskeletal condition(s) with a corresponding ICD-10 code that is of complexity and severity that require skilled therapeutic intervention. This has a direct and significant impact on the need for therapy and significantly impacts the rate of recovery.     Return to Play: NA    Prognosis for POC: [x] Good [] Fair  [] Poor    Patient requires continued skilled intervention: [x] Yes  [] No      CHARGE CAPTURE     PT CHARGE GRID   CPT Code (TIMED) minutes # CPT Code (UNTIMED) #     Therex (91566)  16 1  EVAL:MODERATE (03396 - Typically 30 minutes face-to-face)     Neuromusc. Re-ed (92114)    Re-Eval (10831)     Manual (65893) 23 2  Estim Unattended (89527)     Ther. Act (06882)    Mech. Traction (05019)     Gait (48288)    Dry Needle 1-2 muscle (20560)     Aquatic Therex (79375)    Dry Needle 3+ muscle (20561)     Iontophoresis (44606)    VASO (70425)     Ultrasound (05311)    Group Therapy (88316)     Estim Attended (07712)    Canalith Repositioning (92843)     Other:    Other:    Total Timed Code Tx Minutes 39 3       Total Treatment Minutes 39        Charge Justification:  (26851) THERAPEUTIC EXERCISE - Provided verbal/tactile cueing for activities related to strengthening, flexibility, endurance, ROM performed to prevent loss of range of motion, maintain or improve muscular strength or increase flexibility, following either an injury or surgery.   (18614) HOME EXERCISE PROGRAM - Reviewed/Progressed HEP activities related to strengthening, flexibility, endurance, ROM performed to prevent loss of range of motion, maintain or improve muscular strength or increase flexibility, following either an injury or surgery.  (34205) MANUAL THERAPY -  Manual therapy techniques, 1 or more regions, each 15 minutes (Mobilization/manipulation, manual lymphatic drainage, manual traction) for the purpose of modulating

## 2024-05-30 ENCOUNTER — HOSPITAL ENCOUNTER (OUTPATIENT)
Dept: PHYSICAL THERAPY | Age: 84
Setting detail: THERAPIES SERIES
Discharge: HOME | End: 2024-05-30
Payer: MEDICARE

## 2024-05-30 PROCEDURE — 97110 THERAPEUTIC EXERCISES: CPT

## 2024-05-30 NOTE — PLAN OF CARE
[facility] - Outpatient Rehabilitation and Therapy [address] [place] [address] office: [phone] fax: [phone]        Physical Therapy Re-Certification Plan of Care/MD UPDATE      Dear  ,    We had the pleasure of treating the following patient for physical therapy services at [facility].  A summary of our findings can be found in the updated assessment below.  This includes our plan of care.  If you have any questions or concerns regarding these findings, please do not hesitate to contact me at the office phone number checked above.  Thank you for the referral.     Physician Signature:________________________________Date:__________________  By signing above (or electronic signature), therapist’s plan is approved by physician      Current Functional Status:   Bibiana Ayala 1940 continues to present with functional deficits in strength symmetry and endurance of strength  limiting ability with walking on even ground, walking on uneven ground, and managing community ambulation .  During therapy this date, patient required progression of exercises and program for exercise progression, improving proper muscle recruitment and activation/motor control patterns, and static and dynamic balance. Patient will continue to benefit from ongoing evaluation and advanced clinical decision from a Physical Therapist to improve muscle strength and neuromuscular control to safely return to PLOF without symptoms or restrictions.    Overall Response to Treatment:   [x]Patient is responding well to treatment and improvement is noted with regards to goals   []Patient should continue to improve in reasonable time if they continue HEP   []Patient has plateaued and is no longer responding to skilled PT intervention    []Patient is getting worse and would benefit from return to referring MD   []Patient unable to adhere to initial POC   []Other:       Total Visits: 15

## 2024-05-31 NOTE — TELEPHONE ENCOUNTER
Chart Prep for clinical office visit completed for upcoming appointment.     Pt's son calling Stew Cisneros and would like the Prolia rx sent to [facility] in [facility]. He states the pt likes to go pick it up in advance and take it over to Dr Bettina Alvarez office where they will store it for her and administer on 9-22-21.     CB# for Stew Cisneros (son) [phone]

## 2024-06-06 ENCOUNTER — HOSPITAL ENCOUNTER (OUTPATIENT)
Dept: PHYSICAL THERAPY | Age: 84
Setting detail: THERAPIES SERIES
Discharge: HOME | End: 2024-06-06
Payer: MEDICARE

## 2024-06-06 PROCEDURE — 97110 THERAPEUTIC EXERCISES: CPT

## 2024-06-06 NOTE — FLOWSHEET NOTE
length on R LE    Status: [] Progressing: [x] Met: [] Not Met: [] Adjusted  Patient will demonstrate improved B LE strength to allow for improved functional strength with daily activities.            Status: [] Progressing: [] Met: [] Not Met: [x] Adjusted (NEW)    TREATMENT PLAN     Frequency/Duration: 2x/week for 6 weeks for the following treatment interventions:    Interventions:  [x] Therapeutic exercise including: strength training, ROM, including postural re-education.   [x] NMR activation and proprioception, including postural re-education.    [x] Manual therapy as indicated to include: PROM, Gr I-IV mobilizations, and STM  [x] Modalities as needed that may include: NONE  [x] Patient education on joint protection, postural re-education, activity modification, progression of HEP.        [] Aquatic Therapy    Plan: Cont POC- Continue emphasis/focus on exercise progression, improving proper muscle recruitment and activation/motor control patterns, modulating pain, and allowing for proper ROM. Next visit plan to progress weights, progress reps, add new exercises, and progress balance     Electronically Signed by Elo Mace PT  Date: 06/06/2024     Note: Portions of this note have been templated and/or copied from initial evaluation, reassessments and prior notes for documentation efficiency.

## 2024-06-13 ENCOUNTER — HOSPITAL ENCOUNTER (OUTPATIENT)
Dept: PHYSICAL THERAPY | Age: 84
Setting detail: THERAPIES SERIES
Discharge: HOME | End: 2024-06-13
Payer: MEDICARE

## 2024-06-13 PROCEDURE — 97110 THERAPEUTIC EXERCISES: CPT

## 2024-06-13 PROCEDURE — 97112 NEUROMUSCULAR REEDUCATION: CPT

## 2024-06-13 NOTE — FLOWSHEET NOTE
[facility] - Outpatient Rehabilitation and Therapy [address] [place] [address] office: [phone] fax: [phone]          Physical Therapy: TREATMENT/PROGRESS NOTE   Patient: Bibiana Ayala (83 y.o. female)   Examination Date: 2024   :  1940 MRN: [id number]   Visit #: 17   Insurance Allowable Auth Needed   Medicare/Humana []Yes    [x]No    Insurance: Payor: MEDICARE / Plan: MEDICARE PART A AND B / Product Type: *No Product type* /   Insurance ID: [id number] - (Medicare)  Secondary Insurance (if applicable): HUMANA   Treatment Diagnosis: Multiple fractures of pelvis with stable disruption of pelvic ring, initial encounter for closed fracture [S32.810A], other intraarticular fracture of lower end of R radius S52.571A, unspecified fracture of lower end of R radius S52.501D, R wrist pain M25.531   Medical Diagnosis: other intraarticular fracture of lower end of R radius S52.571A, unspecified fracture of lower end of R radius S52.501D, R wrist pain M25.531  Multiple fractures of pelvis with stable disruption of pelvic ring, initial encounter for closed fracture [S32.810A]   Referring Physician: Pino Bettencourt DO  PCP: Leeanna Silva MD     THERESE AYALA [phone]      Plan of care signed (Y/N):     Date of Patient follow up with Physician:      Progress Report/POC: NO  POC update due: (10 visits /OR AUTH LIMITS, whichever is less)  2024                                             Precautions/ Contra-indications:           Latex allergy:  NO  Pacemaker:    NO  Contraindications for Manipulation: None  Date of Surgery: n/a  Other:    Red Flags:  None    C-SSRS Triggered by Intake questionnaire:   [x] No, Questionnaire did not trigger screening.   [] Yes, Patient intake triggered further evaluation      [] C-SSRS Screening completed  [] PCP notified via Plan of Care  [] Emergency services notified     Preferred Language for Healthcare:   [x] English

## 2024-06-27 ENCOUNTER — HOSPITAL ENCOUNTER (OUTPATIENT)
Dept: PHYSICAL THERAPY | Age: 84
Setting detail: THERAPIES SERIES
Discharge: HOME | End: 2024-06-27
Payer: MEDICARE

## 2024-06-27 PROCEDURE — 97110 THERAPEUTIC EXERCISES: CPT

## 2024-06-27 PROCEDURE — 97112 NEUROMUSCULAR REEDUCATION: CPT

## 2024-06-27 NOTE — FLOWSHEET NOTE
meets the below criteria necessary for medical necessity for care and/or justification of therapy services:  The patient has functional impairments and/or activity limitations and would benefit from continued outpatient therapy services to address the deficits outlined in the patients goals  The patient has a musculoskeletal condition(s) with a corresponding ICD-10 code that is of complexity and severity that require skilled therapeutic intervention. This has a direct and significant impact on the need for therapy and significantly impacts the rate of recovery.     Return to Play: NA    Prognosis for POC: [x] Good [] Fair  [] Poor    Patient requires continued skilled intervention: [x] Yes  [] No      CHARGE CAPTURE     PT CHARGE GRID   CPT Code (TIMED) minutes # CPT Code (UNTIMED) #     Therex (09413)  28 2  EVAL:MODERATE (94579 - Typically 30 minutes face-to-face)     Neuromusc. Re-ed (33703) 15 1  Re-Eval (42354)     Manual (07919)    Estim Unattended (36863)     Ther. Act (91541)    Mech. Traction (71449)     Gait (58664)    Dry Needle 1-2 muscle (86020)     Aquatic Therex (63837)    Dry Needle 3+ muscle (20561)     Iontophoresis (07882)    VASO (79625)     Ultrasound (78775)    Group Therapy (61909)     Estim Attended (46810)    Canalith Repositioning (59788)     Other:    Other:    Total Timed Code Tx Minutes 43 3       Total Treatment Minutes 43        Charge Justification:  (07814) THERAPEUTIC EXERCISE - Provided verbal/tactile cueing for activities related to strengthening, flexibility, endurance, ROM performed to prevent loss of range of motion, maintain or improve muscular strength or increase flexibility, following either an injury or surgery.   (34078) HOME EXERCISE PROGRAM - Reviewed/Progressed HEP activities related to strengthening, flexibility, endurance, ROM performed to prevent loss of range of motion, maintain or improve muscular strength or increase flexibility, following either an injury or

## 2024-07-11 ENCOUNTER — HOSPITAL ENCOUNTER (OUTPATIENT)
Dept: PHYSICAL THERAPY | Age: 84
Setting detail: THERAPIES SERIES
Discharge: HOME | End: 2024-07-11
Payer: MEDICARE

## 2024-07-11 PROCEDURE — 97110 THERAPEUTIC EXERCISES: CPT

## 2024-07-11 NOTE — PLAN OF CARE
Typically 30 minutes face-to-face)     Neuromusc. Re-ed (13190)    Re-Eval (68319)     Manual (72717)    Estim Unattended (38863)     Ther. Act (42358)    Mech. Traction (20777)     Gait (54561)    Dry Needle 1-2 muscle (81221)     Aquatic Therex (53029)    Dry Needle 3+ muscle (20561)     Iontophoresis (61212)    VASO (35727)     Ultrasound (89516)    Group Therapy (75804)     Estim Attended (69615)    Canalith Repositioning (05654)     Other:    Other:    Total Timed Code Tx Minutes 38 3       Total Treatment Minutes 43        Charge Justification:  (26846) THERAPEUTIC EXERCISE - Provided verbal/tactile cueing for activities related to strengthening, flexibility, endurance, ROM performed to prevent loss of range of motion, maintain or improve muscular strength or increase flexibility, following either an injury or surgery.   (98441) HOME EXERCISE PROGRAM - Reviewed/Progressed HEP activities related to strengthening, flexibility, endurance, ROM performed to prevent loss of range of motion, maintain or improve muscular strength or increase flexibility, following either an injury or surgery.  (30856) MANUAL THERAPY -  Manual therapy techniques, 1 or more regions, each 15 minutes (Mobilization/manipulation, manual lymphatic drainage, manual traction) for the purpose of modulating pain, promoting relaxation,  increasing ROM, reducing/eliminating soft tissue swelling/inflammation/restriction, improving soft tissue extensibility and allowing for proper ROM for normal function with self care, mobility, lifting and ambulation      GOALS     Patient stated goal: use hand, walk right  Status: [] Progressing: [x] Met: [] Not Met: [] Adjusted    Therapist goals for Patient:   Short Term Goals: To be achieved in: 2 weeks  Independent in HEP and progression per patient tolerance, in order to progress toward full function and prevent re-injury.    Status: [] Progressing: [x] Met: [] Not Met: [] Adjusted  Patient will have a

## 2024-07-25 ENCOUNTER — APPOINTMENT (OUTPATIENT)
Dept: PHYSICAL THERAPY | Age: 84
End: 2024-07-25
Payer: MEDICARE

## 2024-07-30 ENCOUNTER — HOSPITAL ENCOUNTER (OUTPATIENT)
Dept: PHYSICAL THERAPY | Age: 84
Setting detail: THERAPIES SERIES
Discharge: HOME | End: 2024-07-30
Payer: MEDICARE

## 2024-07-30 PROCEDURE — 97112 NEUROMUSCULAR REEDUCATION: CPT

## 2024-07-30 PROCEDURE — 97161 PT EVAL LOW COMPLEX 20 MIN: CPT

## 2024-07-30 NOTE — PLAN OF CARE
[facility] - Outpatient Rehabilitation and Therapy [address] [place] [address] office: [phone] fax: [phone]     Physical Therapy Initial Evaluation Certification      Dear Leeanna Silva MD,    We had the pleasure of evaluating the following patient for physical therapy services at [facility].  A summary of our findings can be found in the initial assessment below.  This includes our plan of care.  If you have any questions or concerns regarding these findings, please do not hesitate to contact me at the office phone number listed above.  Thank you for the referral.     Physician Signature:_______________________________Date:__________________  By signing above (or electronic signature), therapist’s plan is approved by physician       Physical Therapy: TREATMENT/PROGRESS NOTE   Patient: Bibiana Ayala (83 y.o. female)   Examination Date: 2024   :  1940 MRN: [id number]   Visit #: 1   Insurance Allowable Auth Needed   Medicare/Humana []Yes    []No    Insurance: Payor: MEDICARE / Plan: MEDICARE PART A AND B / Product Type: *No Product type* /   Insurance ID: [id number] - (Medicare)  Secondary Insurance (if applicable): HUMANA   Treatment Diagnosis: R26.81 unsteadiness on feet, abnormalities of gait R26.89  No diagnosis found.   Medical Diagnosis:  Other abnormalities of gait and mobility [R26.89]  Other symptoms and signs involving the musculoskeletal system [R29.898]   Referring Physician: Leeanna Silva MD  PCP: Leeanna Silva MD     Plan of care signed (Y/N):     Date of Patient follow up with Physician:      Plan of Care Report: EVAL today  POC update due: (10 visits /OR AUTH LIMITS, whichever is less)  2024                                             Medical History:  Comorbidities:  Stroke/TIA  Other Neurological Conditions: dementia  Relevant Medical History: fall a few months ago resulting in pelvic and wrist

## 2024-08-01 ENCOUNTER — APPOINTMENT (OUTPATIENT)
Dept: PHYSICAL THERAPY | Age: 84
End: 2024-08-01
Payer: MEDICARE

## 2024-08-01 ENCOUNTER — HOSPITAL ENCOUNTER (OUTPATIENT)
Dept: PHYSICAL THERAPY | Age: 84
Setting detail: THERAPIES SERIES
Discharge: HOME | End: 2024-08-01
Payer: MEDICARE

## 2024-08-01 PROCEDURE — 97112 NEUROMUSCULAR REEDUCATION: CPT

## 2024-08-01 NOTE — FLOWSHEET NOTE
[facility] - Outpatient Rehabilitation and Therapy [address] [place] [address] office: [phone] fax: [phone]         Physical Therapy: TREATMENT/PROGRESS NOTE   Patient: Bibiana Ayala (83 y.o. female)   Examination Date: 2024   :  1940 MRN: [id number]   Visit #: 20   Insurance Allowable Auth Needed   Medicare/Humana []Yes    []No    Insurance: Payor: MEDICARE / Plan: MEDICARE PART A AND B / Product Type: *No Product type* /   Insurance ID: [id number] - (Medicare)  Secondary Insurance (if applicable): HUMANA   Treatment Diagnosis: R26.81 unsteadiness on feet, abnormalities of gait R26.89  No diagnosis found.   Medical Diagnosis:  Other abnormalities of gait and mobility [R26.89]  Other symptoms and signs involving the musculoskeletal system [R29.898]   Referring Physician: Leeanna Silva MD  PCP: Leeanna Silva MD     Plan of care signed (Y/N):     Date of Patient follow up with Physician:      Plan of Care Report: NO  POC update due: (10 visits /OR AUTH LIMITS, whichever is less)  2024                                             Medical History:  Comorbidities:  Stroke/TIA  Other Neurological Conditions: dementia  Relevant Medical History: fall a few months ago resulting in pelvic and wrist fracture  L knee OA                                          Precautions/ Contra-indications:           Latex allergy:  NO  Pacemaker:    NO  Contraindications for Manipulation: None  Date of Surgery: n/a  Other:    Red Flags:  None    Suicide Screening:   The patient did not verbalize a primary behavioral concern, suicidal ideation, suicidal intent, or demonstrate suicidal behaviors.    Preferred Language for Healthcare:   [x] English       [] other:    SUBJECTIVE EXAMINATION     Patient stated complaint: Patient states she is doing well today.         Test used Initial score  2024   Pain Summary VAS 0/10    Functional questionnaire LEFS and Box

## 2024-08-15 ENCOUNTER — HOSPITAL ENCOUNTER (OUTPATIENT)
Dept: PHYSICAL THERAPY | Age: 84
Setting detail: THERAPIES SERIES
Discharge: HOME | End: 2024-08-15
Payer: MEDICARE

## 2024-08-15 ENCOUNTER — APPOINTMENT (OUTPATIENT)
Dept: PHYSICAL THERAPY | Age: 84
End: 2024-08-15
Payer: MEDICARE

## 2024-08-15 PROCEDURE — 97112 NEUROMUSCULAR REEDUCATION: CPT

## 2024-08-15 NOTE — FLOWSHEET NOTE
sitting and/or standing activities  (76683) HOME EXERCISE PROGRAM - Reviewed/Progressed HEP activities related to neuromuscular reeducation of movement, balance, coordination, kinesthetic sense, posture, and/or proprioception for sitting and/or standing activities      GOALS     Patient stated goal: improve balance  [] Progressing: [] Met: [] Not Met: [] Adjusted    Therapist goals for Patient:   Short Term Goals: To be achieved in: 2 weeks  1. Independent in HEP and progression per patient tolerance, in order to prevent re-injury.   [] Progressing: [] Met: [] Not Met: [] Adjusted  2. Patient will have a decrease in pain to <0/10 to facilitate improvement in movement, function, and ADLs as indicated by Functional Deficits.  [] Progressing: [] Met: [] Not Met: [] Adjusted    Long Term Goals: To be achieved in: 12 weeks  1. Disability index score of 48/56 or better for the Box Balance Scale to assist with reaching prior level of function with activities such as standing/walking.  [] Progressing: [] Met: [] Not Met: [] Adjusted    2. Patient will demonstrate increased Strength of hips/knees to at least 4+/5 throughout without pain to allow for proper functional mobility to enable patient to perform sit to standing without use of hands and improve eccentric control when going from standing to sit.   [] Progressing: [] Met: [] Not Met: [] Adjusted  3. Patient will return to being able to balance on level and unlevel surfaces without increased symptoms or restriction.   [] Progressing: [] Met: [] Not Met: [] Adjusted      Overall Progression Towards Functional goals/ Treatment Progress Update:  [] Patient is progressing as expected towards functional goals listed.    [] Progression is slowed due to complexities/Impairments listed.  [] Progression has been slowed due to co-morbidities.  [x] Plan just implemented, too soon (<30days) to assess goals progression   [] Goals require adjustment due to lack of progress  []

## 2024-09-05 ENCOUNTER — APPOINTMENT (OUTPATIENT)
Dept: PHYSICAL THERAPY | Age: 84
End: 2024-09-05
Payer: MEDICARE

## 2024-09-06 ENCOUNTER — HOSPITAL ENCOUNTER (OUTPATIENT)
Dept: PHYSICAL THERAPY | Age: 84
Setting detail: THERAPIES SERIES
Discharge: HOME | End: 2024-09-06
Payer: MEDICARE

## 2024-09-06 PROCEDURE — 97112 NEUROMUSCULAR REEDUCATION: CPT

## 2024-09-06 NOTE — FLOWSHEET NOTE
complexities/Impairments listed.  [] Progression has been slowed due to co-morbidities.  [x] Plan just implemented, too soon (<30days) to assess goals progression   [] Goals require adjustment due to lack of progress  [] Patient is not progressing as expected and requires additional follow up with physician  [] Other:     TREATMENT PLAN     Frequency/Duration: 1x/week every other week for  12  weeks for the following treatment interventions:    Interventions:  Therapeutic Exercise (36986) including: strength training, ROM, and functional mobility  Therapeutic Activities (86779) including: functional mobility training and education.  Neuromuscular Re-education (93659) activation and proprioception, including postural re-education.      Plan: POC initiated as per evaluation    Electronically Signed by Elo Mace PT  Date: 09/06/2024     Note: Portions of this note have been templated and/or copied from initial evaluation, reassessments and prior notes for documentation efficiency.    Note: If patient does not return for scheduled/recommended follow up visits, this note will serve as a discharge from care along with the most recent update on progress.

## 2024-09-19 ENCOUNTER — APPOINTMENT (OUTPATIENT)
Dept: PHYSICAL THERAPY | Age: 84
End: 2024-09-19
Payer: MEDICARE

## 2024-09-26 ENCOUNTER — HOSPITAL ENCOUNTER (OUTPATIENT)
Dept: PHYSICAL THERAPY | Age: 84
Setting detail: THERAPIES SERIES
Discharge: HOME | End: 2024-09-26
Payer: MEDICARE

## 2024-09-26 PROCEDURE — 97112 NEUROMUSCULAR REEDUCATION: CPT

## 2024-09-30 ENCOUNTER — TELEPHONE (OUTPATIENT)
Dept: CARDIOLOGY CLINIC | Age: 84
End: 2024-09-30

## 2024-09-30 NOTE — TELEPHONE ENCOUNTER
Pt's called requesting if it was possible for his mother to be seen later in the day on the 11/25 due to scheduling conflicts. [place] comes from [facility] to bring [place] to appointments. If not, he states he will attempt to figure something out.    Please contact to discuss

## 2024-10-10 ENCOUNTER — HOSPITAL ENCOUNTER (OUTPATIENT)
Dept: PHYSICAL THERAPY | Age: 84
Setting detail: THERAPIES SERIES
Discharge: HOME | End: 2024-10-10
Payer: MEDICARE

## 2024-10-10 PROCEDURE — 97110 THERAPEUTIC EXERCISES: CPT

## 2024-10-10 PROCEDURE — 97140 MANUAL THERAPY 1/> REGIONS: CPT

## 2024-10-10 NOTE — FLOWSHEET NOTE
[facility] - Outpatient Rehabilitation and Therapy [address] [place] [address] office: [phone] fax: [phone]         Physical Therapy: TREATMENT/PROGRESS NOTE   Patient: Bibiana Ayala (84 y.o. female)   Examination Date: 10/10/2024   :  1940 MRN: [id number]   Visit #: 6   Insurance Allowable Auth Needed   Medicare/Humana []Yes    []No    Insurance: Payor: MEDICARE / Plan: MEDICARE PART A AND B / Product Type: *No Product type* /   Insurance ID: [id number] - (Medicare)  Secondary Insurance (if applicable): HUMANA   Treatment Diagnosis: R26.81 unsteadiness on feet, abnormalities of gait R26.89  No diagnosis found.   Medical Diagnosis:  Other abnormalities of gait and mobility [R26.89]  Other symptoms and signs involving the musculoskeletal system [R29.898]   Referring Physician: Leeanna Silva MD  PCP: Leeanna Silva MD     Plan of care signed (Y/N):     Date of Patient follow up with Physician:      Plan of Care Report: NO  POC update due: (10 visits /OR AUTH LIMITS, whichever is less)  2024                                             Medical History:  Comorbidities:  Stroke/TIA  Other Neurological Conditions: dementia  Relevant Medical History: fall a few months ago resulting in pelvic and wrist fracture  L knee OA                                          Precautions/ Contra-indications:           Latex allergy:  NO  Pacemaker:    NO  Contraindications for Manipulation: None  Date of Surgery: n/a  Other:    Red Flags:  None    Suicide Screening:   The patient did not verbalize a primary behavioral concern, suicidal ideation, suicidal intent, or demonstrate suicidal behaviors.    Preferred Language for Healthcare:   [x] English       [] other:    SUBJECTIVE EXAMINATION     Patient stated complaint: Patient states she is doing well today. Overall feels like she is doing pretty good. No new news to report.        Test used Initial score  7/30/24 10/10/2024

## 2024-10-24 ENCOUNTER — HOSPITAL ENCOUNTER (OUTPATIENT)
Dept: PHYSICAL THERAPY | Age: 84
Setting detail: THERAPIES SERIES
Discharge: HOME | End: 2024-10-24
Payer: MEDICARE

## 2024-10-24 PROCEDURE — 97112 NEUROMUSCULAR REEDUCATION: CPT

## 2024-10-24 PROCEDURE — 97110 THERAPEUTIC EXERCISES: CPT

## 2024-10-24 NOTE — FLOWSHEET NOTE
progressing as expected towards functional goals listed.    [] Progression is slowed due to complexities/Impairments listed.  [] Progression has been slowed due to co-morbidities.  [x] Plan just implemented, too soon (<30days) to assess goals progression   [] Goals require adjustment due to lack of progress  [] Patient is not progressing as expected and requires additional follow up with physician  [] Other:     TREATMENT PLAN     Frequency/Duration: 1x/week every other week for  12  weeks for the following treatment interventions:    Interventions:  Therapeutic Exercise (58481) including: strength training, ROM, and functional mobility  Therapeutic Activities (90286) including: functional mobility training and education.  Neuromuscular Re-education (32206) activation and proprioception, including postural re-education.      Plan: POC initiated as per evaluation    Electronically Signed by Elo Mace, PT  Date: 10/24/2024     Note: Portions of this note have been templated and/or copied from initial evaluation, reassessments and prior notes for documentation efficiency.    Note: If patient does not return for scheduled/recommended follow up visits, this note will serve as a discharge from care along with the most recent update on progress.

## 2024-11-06 NOTE — PROGRESS NOTES
Cardiac Follow Up    Referring Provider:  Leeanna Silva MD       Chief Complaint   Patient presents with    Hypertension    Hyperlipidemia     History of Present Illness:  Bibiana is an 84 y.o. female. 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 Kennedy in hematology. She also has a history of TIA, and hyperlipidemia.  In 1996, she had an episode of blurry eye sight with hand numbness that was diagnosed as a complicated migraine. She had an echo with bubble study April 4, 2014 - LV normal with LVEF 55-60%, diastolic dysfunction, trace MR, and moderate TR.  Bibiana was admitted to [facility] 10/30/14 with slurred speech, difficulty finding words, and transient headaches. She was transferred to [facility] and evaluated by oncology, neurology, and cardiology.  She thinks the cause for symptoms was likely because of sub therapeutic INR and TIA.  . She had an echo with bubble study 1/18/2016 that did not document an ASD.   In 2015 she fell and fractured her hip and had surgery.  Bibiana reports having TIA's from time to time. In June 2016, she was on the phone with her son who lives in [facility], and she couldn't put two words together. She ended up being admitted to [facility] hospital for three days with a TIA.  She was on coumadin at the time with therapeutic INR's. Her neurologist is Dr. Soni.  ARIN done in 12/27/16 showed no evidence of shunting, mild MR.Due to failure of coumadin she was switched to xarelto.  Stopped Xarelto and changed to Eliquis 2.5mg bid due to change in Kidney function. - Kidney function much improved.   Was seen at [facility] in July 2021 for syncopal episode witnessed by her son. Her workup was unremarkable. Likely TIA, no residual effects. She has since been seen for memory loss.     She is active with her [employer], she sings and she participates in [employer] study. 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 Her new cat is 
intolerance. No excessive thirst, fluid intake, or urination. No tremor.  Hematologic/Lymphatic: No abnormal bruising or bleeding, blood clots or swollen lymph nodes.  Allergic/Immunologic: No nasal congestion or hives.    Physical Examination:    There were no vitals filed for this visit.          Constitutional and General Appearance: NAD, pleasant  Skin:good turgor,intact without lesions  HEENT: EOMI ,normal  Neck:no JVD    Respiratory:  Normal excursion and expansion without use of accessory muscles  Resp Auscultation: Normal breath sounds without dullness  Cardiovascular:  The apical impulses not displaced  Heart tones are crisp and normal  Cervical veins are not engorged  The carotid upstroke is normal in amplitude and contour without delay or bruit  Normal S1S2, No S3, no murmur  Peripheral pulses are symmetrical and full  There is no clubbing, cyanosis of the extremities.  No edema.  Femoral Arteries: 2+ and equal  Pedal Pulses: 2+ and equal   Abdomen:  No masses or tenderness  Liver/Spleen: No Abnormalities Noted  Neurological/Psychiatric:  Alert and oriented in all spheres  Moves all extremities well  Exhibits normal gait balance and coordination  No abnormalities of mood, affect, memory, mentation, or behavior are noted    ARIN 12/27/16  Summary   Mild mitral regurgitation is present.   A bubble study was performed and showed no evidence of shunting.No evidence   to support presence of a PFO    1/2016 Echo Bubble Study---  Mild concentric left ventricular hypertrophy is present.  Overall left ventricular function is normal.  Ejection fraction is visually estimated to be 55-60%. There is reversal of  E/A inflow velocities across the mitral valve suggesting impaired left ventricular relaxation.  Severe calcification of the posterior mitral valve annulus.  A bubble study was performed and fails to show evidence of shunting.  Right heart size is borderline enlarged with normal RV function.     Echo

## 2024-11-07 ENCOUNTER — HOSPITAL ENCOUNTER (OUTPATIENT)
Dept: PHYSICAL THERAPY | Age: 84
Setting detail: THERAPIES SERIES
Discharge: HOME | End: 2024-11-07
Payer: MEDICARE

## 2024-11-07 PROCEDURE — 97110 THERAPEUTIC EXERCISES: CPT

## 2024-11-07 PROCEDURE — 97140 MANUAL THERAPY 1/> REGIONS: CPT

## 2024-11-07 NOTE — FLOWSHEET NOTE
[facility] - Outpatient Rehabilitation and Therapy [address] [place] [address] office: [phone] fax: [phone]         Physical Therapy: TREATMENT/PROGRESS NOTE   Patient: Bibaina Ayala (84 y.o. female)   Examination Date: 2024   :  1940 MRN: [id number]   Visit #: 8   Insurance Allowable Auth Needed   Medicare/Humana []Yes    []No    Insurance: Payor: MEDICARE / Plan: MEDICARE PART A AND B / Product Type: *No Product type* /   Insurance ID: [id number] - (Medicare)  Secondary Insurance (if applicable): HUMANA   Treatment Diagnosis: R26.81 unsteadiness on feet, abnormalities of gait R26.89  No diagnosis found.   Medical Diagnosis:  Other abnormalities of gait and mobility [R26.89]  Other symptoms and signs involving the musculoskeletal system [R29.898]   Referring Physician: Leeanna Silva MD  PCP: Leeanna Silva MD     Plan of care signed (Y/N):     Date of Patient follow up with Physician:      Plan of Care Report: NO  POC update due: (10 visits /OR AUTH LIMITS, whichever is less)  2024                                             Medical History:  Comorbidities:  Stroke/TIA  Other Neurological Conditions: dementia  Relevant Medical History: fall a few months ago resulting in pelvic and wrist fracture  L knee OA                                          Precautions/ Contra-indications:           Latex allergy:  NO  Pacemaker:    NO  Contraindications for Manipulation: None  Date of Surgery: n/a  Other:    Red Flags:  None    Suicide Screening:   The patient did not verbalize a primary behavioral concern, suicidal ideation, suicidal intent, or demonstrate suicidal behaviors.    Preferred Language for Healthcare:   [x] English       [] other:    SUBJECTIVE EXAMINATION     Patient stated complaint: Patient states she is doing well today. States \"no complaints\". Felt fine after last session.          Test used Initial score  2024   Pain Summary

## 2024-11-21 ENCOUNTER — HOSPITAL ENCOUNTER (OUTPATIENT)
Dept: PHYSICAL THERAPY | Age: 84
Setting detail: THERAPIES SERIES
Discharge: HOME | End: 2024-11-21
Payer: MEDICARE

## 2024-11-21 PROCEDURE — 97110 THERAPEUTIC EXERCISES: CPT

## 2024-11-21 PROCEDURE — 97112 NEUROMUSCULAR REEDUCATION: CPT

## 2024-11-21 NOTE — FLOWSHEET NOTE
[facility] - Outpatient Rehabilitation and Therapy [address] [place] [address] office: [phone] fax: [phone]         Physical Therapy: TREATMENT/PROGRESS NOTE   Patient: Bibiana Ayala (84 y.o. female)   Examination Date: 2024   :  1940 MRN: [id number]   Visit #: 9   Insurance Allowable Auth Needed   Medicare/Humana []Yes    []No    Insurance: Payor: MEDICARE / Plan: MEDICARE PART A AND B / Product Type: *No Product type* /   Insurance ID: [id number] - (Medicare)  Secondary Insurance (if applicable): HUMANA   Treatment Diagnosis: R26.81 unsteadiness on feet, abnormalities of gait R26.89  No diagnosis found.   Medical Diagnosis:  Other abnormalities of gait and mobility [R26.89]  Other symptoms and signs involving the musculoskeletal system [R29.898]   Referring Physician: Leeanna Silva MD  PCP: Leeanna Silva MD     Plan of care signed (Y/N):     Date of Patient follow up with Physician:      Plan of Care Report: NO  POC update due: (10 visits /OR AUTH LIMITS, whichever is less)  2024                                             Medical History:  Comorbidities:  Stroke/TIA  Other Neurological Conditions: dementia  Relevant Medical History: fall a few months ago resulting in pelvic and wrist fracture  L knee OA                                          Precautions/ Contra-indications:           Latex allergy:  NO  Pacemaker:    NO  Contraindications for Manipulation: None  Date of Surgery: n/a  Other:    Red Flags:  None    Suicide Screening:   The patient did not verbalize a primary behavioral concern, suicidal ideation, suicidal intent, or demonstrate suicidal behaviors.    Preferred Language for Healthcare:   [x] English       [] other:    SUBJECTIVE EXAMINATION     Patient stated complaint: Patient states she is doing well today. States \"no complaints\". Felt fine after last session.          Test used Initial score  2024   Pain Summary

## 2024-11-25 ENCOUNTER — OFFICE VISIT (OUTPATIENT)
Dept: CARDIOLOGY CLINIC | Age: 84
End: 2024-11-25
Payer: MEDICARE

## 2024-11-25 VITALS
OXYGEN SATURATION: 96 % | DIASTOLIC BLOOD PRESSURE: 70 MMHG | SYSTOLIC BLOOD PRESSURE: 122 MMHG | BODY MASS INDEX: 26.13 KG/M2 | WEIGHT: 142 LBS | HEART RATE: 84 BPM | HEIGHT: 62 IN

## 2024-11-25 DIAGNOSIS — R55 SYNCOPE, UNSPECIFIED SYNCOPE TYPE: ICD-10-CM

## 2024-11-25 DIAGNOSIS — I10 HTN (HYPERTENSION), BENIGN: Primary | ICD-10-CM

## 2024-11-25 DIAGNOSIS — D68.51 FACTOR V LEIDEN (HCC): ICD-10-CM

## 2024-11-25 DIAGNOSIS — E78.2 MIXED HYPERLIPIDEMIA: ICD-10-CM

## 2024-11-25 DIAGNOSIS — I65.23 BILATERAL CAROTID ARTERY STENOSIS: ICD-10-CM

## 2024-11-25 PROCEDURE — 1090F PRES/ABSN URINE INCON ASSESS: CPT | Performed by: INTERNAL MEDICINE

## 2024-11-25 PROCEDURE — G8419 CALC BMI OUT NRM PARAM NOF/U: HCPCS | Performed by: INTERNAL MEDICINE

## 2024-11-25 PROCEDURE — G8427 DOCREV CUR MEDS BY ELIG CLIN: HCPCS | Performed by: INTERNAL MEDICINE

## 2024-11-25 PROCEDURE — G8484 FLU IMMUNIZE NO ADMIN: HCPCS | Performed by: INTERNAL MEDICINE

## 2024-11-25 PROCEDURE — 1159F MED LIST DOCD IN RCRD: CPT | Performed by: INTERNAL MEDICINE

## 2024-11-25 PROCEDURE — 99214 OFFICE O/P EST MOD 30 MIN: CPT | Performed by: INTERNAL MEDICINE

## 2024-11-25 PROCEDURE — 1123F ACP DISCUSS/DSCN MKR DOCD: CPT | Performed by: INTERNAL MEDICINE

## 2024-11-25 PROCEDURE — 1036F TOBACCO NON-USER: CPT | Performed by: INTERNAL MEDICINE

## 2024-12-05 ENCOUNTER — HOSPITAL ENCOUNTER (OUTPATIENT)
Dept: PHYSICAL THERAPY | Age: 84
Setting detail: THERAPIES SERIES
Discharge: HOME | End: 2024-12-05
Payer: MEDICARE

## 2024-12-05 PROCEDURE — 97112 NEUROMUSCULAR REEDUCATION: CPT

## 2024-12-05 PROCEDURE — 97110 THERAPEUTIC EXERCISES: CPT

## 2024-12-05 NOTE — FLOWSHEET NOTE
[facility] - Outpatient Rehabilitation and Therapy [address] [place] [address] office: [phone] fax: [phone]         Physical Therapy: TREATMENT/PROGRESS NOTE   Patient: Bibiana Ayala (84 y.o. female)   Examination Date: 2024   :  1940 MRN: [id number]   Visit #: 10   Insurance Allowable Auth Needed   Medicare/Humana []Yes    []No    Insurance: Payor: MEDICARE / Plan: MEDICARE PART A AND B / Product Type: *No Product type* /   Insurance ID: [id number] - (Medicare)  Secondary Insurance (if applicable): HUMANA   Treatment Diagnosis: R26.81 unsteadiness on feet, abnormalities of gait R26.89  No diagnosis found.   Medical Diagnosis:  Other abnormalities of gait and mobility [R26.89]  Other symptoms and signs involving the musculoskeletal system [R29.898]   Referring Physician: Leeanna Silva MD  PCP: Leeanna Silva MD     Plan of care signed (Y/N):     Date of Patient follow up with Physician:      Plan of Care Report: NO  POC update due: (10 visits /OR AUTH LIMITS, whichever is less)  1/3/2025                                             Medical History:  Comorbidities:  Stroke/TIA  Other Neurological Conditions: dementia  Relevant Medical History: fall a few months ago resulting in pelvic and wrist fracture  L knee OA                                          Precautions/ Contra-indications:           Latex allergy:  NO  Pacemaker:    NO  Contraindications for Manipulation: None  Date of Surgery: n/a  Other:    Red Flags:  None    Suicide Screening:   The patient did not verbalize a primary behavioral concern, suicidal ideation, suicidal intent, or demonstrate suicidal behaviors.    Preferred Language for Healthcare:   [x] English       [] other:    SUBJECTIVE EXAMINATION     Patient stated complaint: Patient states she is doing well today. No reports of any issue after last session.           Test used Initial score  2024   Pain Summary VAS 0/10 0

## 2024-12-19 ENCOUNTER — HOSPITAL ENCOUNTER (OUTPATIENT)
Dept: PHYSICAL THERAPY | Age: 84
Setting detail: THERAPIES SERIES
Discharge: HOME | End: 2024-12-19
Payer: MEDICARE

## 2024-12-19 PROCEDURE — 97110 THERAPEUTIC EXERCISES: CPT

## 2024-12-19 PROCEDURE — 97112 NEUROMUSCULAR REEDUCATION: CPT

## 2024-12-19 NOTE — PLAN OF CARE
[facility] - Outpatient Rehabilitation and Therapy [address] [place] [address] office: [phone] fax: [phone]        Physical Therapy Re-Certification Plan of Care/MD UPDATE      Dear Leeanna Silva ,    We had the pleasure of treating the following patient for physical therapy services at [facility].  A summary of our findings can be found in the updated assessment below.  This includes our plan of care.  If you have any questions or concerns regarding these findings, please do not hesitate to contact me at the office phone number checked above.  Thank you for the referral.     Physician Signature:________________________________Date:__________________  By signing above (or electronic signature), therapist’s plan is approved by physician      Current Functional Status:   Bibiana Ayala 1940 continues to present with functional deficits in ROM, strength symmetry, and endurance of strength  limiting ability with walking on even ground, walking on uneven ground, managing community ambulation, and walking up/down stairs .  During therapy this date, patient required progression of exercises and program for exercise progression, improving proper muscle recruitment and activation/motor control patterns, increasing ROM, and static and dynamic balance. Patient will continue to benefit from ongoing evaluation and advanced clinical decision from a Physical Therapist to improve muscle strength, neuromuscular control, endurance, and balance and proprioception to safely return to OF without symptoms or restrictions.    Overall Response to Treatment:   [x]Patient is responding well to treatment and improvement is noted with regards to goals   []Patient should continue to improve in reasonable time if they continue HEP   []Patient has plateaued and is no longer responding to skilled PT intervention    []Patient is getting worse and would benefit from return to

## 2025-01-02 ENCOUNTER — HOSPITAL ENCOUNTER (OUTPATIENT)
Dept: PHYSICAL THERAPY | Age: 85
Setting detail: THERAPIES SERIES
Discharge: HOME | End: 2025-01-02
Payer: MEDICARE

## 2025-01-02 PROCEDURE — 97110 THERAPEUTIC EXERCISES: CPT

## 2025-01-02 PROCEDURE — 97112 NEUROMUSCULAR REEDUCATION: CPT

## 2025-01-02 NOTE — FLOWSHEET NOTE
[facility] - Outpatient Rehabilitation and Therapy [address] [place] [address] office: [phone] fax: [phone]          Physical Therapy: TREATMENT/PROGRESS NOTE   Patient: Bibiana Ayala (84 y.o. female)   Examination Date: 2025   :  1940 MRN: [id number]   Visit #: 12   Insurance Allowable Auth Needed   Medicare/Humana []Yes    []No    Insurance: Payor: MEDICARE / Plan: MEDICARE PART A AND B / Product Type: *No Product type* /   Insurance ID: [id number] - (Medicare)  Secondary Insurance (if applicable): HUMANA   Treatment Diagnosis: R26.81 unsteadiness on feet, abnormalities of gait R26.89  No diagnosis found.   Medical Diagnosis:  Other abnormalities of gait and mobility [R26.89]  Other symptoms and signs involving the musculoskeletal system [R29.898]   Referring Physician: Leeanna Silva MD  PCP: Leeanna Silva MD     Plan of care signed (Y/N):     Date of Patient follow up with Physician:      Plan of Care Report: YES, Date Range for this report: 2024 to 2024  POC update due: (10 visits /OR AUTH LIMITS, whichever is less)  2025                                             Medical History:  Comorbidities:  Stroke/TIA  Other Neurological Conditions: dementia  Relevant Medical History: fall a few months ago resulting in pelvic and wrist fracture  L knee OA                                          Precautions/ Contra-indications:           Latex allergy:  NO  Pacemaker:    NO  Contraindications for Manipulation: None  Date of Surgery: n/a  Other:    Red Flags:  None    Suicide Screening:   The patient did not verbalize a primary behavioral concern, suicidal ideation, suicidal intent, or demonstrate suicidal behaviors.    Preferred Language for Healthcare:   [x] English       [] other:    SUBJECTIVE EXAMINATION     Patient stated complaint: Patient states she is doing well today. Less achy in her knee today compared to last week.            Test

## 2025-01-10 ENCOUNTER — TELEPHONE (OUTPATIENT)
Dept: CARDIOLOGY CLINIC | Age: 85
End: 2025-01-10

## 2025-01-10 DIAGNOSIS — R55 SYNCOPE, UNSPECIFIED SYNCOPE TYPE: Primary | ICD-10-CM

## 2025-01-10 DIAGNOSIS — I10 HTN (HYPERTENSION), BENIGN: ICD-10-CM

## 2025-01-10 NOTE — TELEPHONE ENCOUNTER
Abbey, please call Bob to schedule ECHO and 7day monitor next week in [place] (his number is below). Thank you.

## 2025-01-10 NOTE — TELEPHONE ENCOUNTER
Per Dr. Waytt> Ms. Ayala had an episode of syncope yesterday, likely viral illness with dehydration. If no ECHO in the last 6 months set one up and also a 7 day event recorder.      Last ECHO was 5/23/24 and last monitor was in 2021. Orders placed.    I spoke to her son Bob [phone]; he is in [facility]. He reports she fell yesterday backwards onto carpet. She did not hit her head but was apparently unconscious for a time. She was seen at [facility] ER. No fractures but she is sore. Her son reports she is also confused (h/o vascular dementia) and has friends staying with her for now, also [facility] staff came out today. He would like us to call him to schedule the ECHO and monitor.

## 2025-01-11 DIAGNOSIS — I65.23 BILATERAL CAROTID ARTERY STENOSIS: ICD-10-CM

## 2025-01-11 DIAGNOSIS — D68.51 FACTOR V LEIDEN (HCC): ICD-10-CM

## 2025-01-13 RX ORDER — APIXABAN 2.5 MG/1
2.5 TABLET, FILM COATED ORAL 2 TIMES DAILY
Qty: 180 TABLET | Refills: 3 | Status: SHIPPED | OUTPATIENT
Start: 2025-01-13

## 2025-01-13 NOTE — TELEPHONE ENCOUNTER
Received refill request for  eliquis from [facility] pharmacy.    Last ov: 11/25/LES     Last Refill: 01/03/2024 #180 w/ 3    Next appointment: 05/22/2025 LES

## 2025-01-16 ENCOUNTER — HOSPITAL ENCOUNTER (OUTPATIENT)
Dept: PHYSICAL THERAPY | Age: 85
Setting detail: THERAPIES SERIES
Discharge: HOME | End: 2025-01-16
Payer: MEDICARE

## 2025-01-16 ENCOUNTER — ANCILLARY PROCEDURE (OUTPATIENT)
Dept: CARDIOLOGY CLINIC | Age: 85
End: 2025-01-16
Payer: MEDICARE

## 2025-01-16 DIAGNOSIS — R55 SYNCOPE, UNSPECIFIED SYNCOPE TYPE: ICD-10-CM

## 2025-01-16 PROCEDURE — 93242 EXT ECG>48HR<7D RECORDING: CPT | Performed by: INTERNAL MEDICINE

## 2025-01-16 PROCEDURE — 97110 THERAPEUTIC EXERCISES: CPT

## 2025-01-16 PROCEDURE — 97112 NEUROMUSCULAR REEDUCATION: CPT

## 2025-01-16 NOTE — FLOWSHEET NOTE
[facility] - Outpatient Rehabilitation and Therapy [address] [place] [address] office: [phone] fax: [phone]          Physical Therapy: TREATMENT/PROGRESS NOTE   Patient: Bibiana Ayala (84 y.o. female)   Examination Date: 2025   :  1940 MRN: [id number]   Visit #: 13   Insurance Allowable Auth Needed   Medicare/Humana []Yes    []No    Insurance: Payor: MEDICARE / Plan: MEDICARE PART A AND B / Product Type: *No Product type* /   Insurance ID: [id number] - (Medicare)  Secondary Insurance (if applicable): HUMANA   Treatment Diagnosis: R26.81 unsteadiness on feet, abnormalities of gait R26.89  No diagnosis found.   Medical Diagnosis:  Other abnormalities of gait and mobility [R26.89]  Other symptoms and signs involving the musculoskeletal system [R29.898]   Referring Physician: Leeanna Silva MD  PCP: Leeanna Silva MD     Plan of care signed (Y/N):     Date of Patient follow up with Physician:      Plan of Care Report: YES, Date Range for this report: 2024 to 2024  POC update due: (10 visits /OR AUTH LIMITS, whichever is less)  2025                                             Medical History:  Comorbidities:  Stroke/TIA  Other Neurological Conditions: dementia  Relevant Medical History: fall a few months ago resulting in pelvic and wrist fracture  L knee OA                                          Precautions/ Contra-indications:           Latex allergy:  NO  Pacemaker:    NO  Contraindications for Manipulation: None  Date of Surgery: n/a  Other:    Red Flags:  None    Suicide Screening:   The patient did not verbalize a primary behavioral concern, suicidal ideation, suicidal intent, or demonstrate suicidal behaviors.    Preferred Language for Healthcare:   [x] English       [] other:    SUBJECTIVE EXAMINATION     Patient stated complaint: Patient states she is doing well today. Had cardiac appt earlier today and has a heart monitor for the next

## 2025-01-17 NOTE — NURSING NOTE
7 day monitor placed on the patient here in clinic today . Reviewed proper care and instructions with the patient here in clinic.

## 2025-01-28 PROCEDURE — 93244 EXT ECG>48HR<7D REV&INTERPJ: CPT | Performed by: INTERNAL MEDICINE

## 2025-01-29 LAB — ECHO BSA: 1.68 M2

## 2025-02-06 ENCOUNTER — HOSPITAL ENCOUNTER (OUTPATIENT)
Dept: PHYSICAL THERAPY | Age: 85
Setting detail: THERAPIES SERIES
Discharge: HOME | End: 2025-02-06
Payer: MEDICARE

## 2025-02-06 PROCEDURE — 97110 THERAPEUTIC EXERCISES: CPT

## 2025-02-06 PROCEDURE — 97140 MANUAL THERAPY 1/> REGIONS: CPT

## 2025-02-06 PROCEDURE — 97112 NEUROMUSCULAR REEDUCATION: CPT

## 2025-02-06 NOTE — FLOWSHEET NOTE
[facility] [place] - Outpatient Rehabilitation and Therapy [address] [place] [address] office: [phone] fax: [phone]          Physical Therapy: TREATMENT/PROGRESS NOTE   Patient: Bibiana Ayala (84 y.o. female)   Examination Date: 2025   :  1940 MRN: [id number]   Visit #: 14   Insurance Allowable Auth Needed   Medicare/Humana []Yes    []No    Insurance: Payor: MEDICARE / Plan: MEDICARE PART A AND B / Product Type: *No Product type* /   Insurance ID: [id number] - (Medicare)  Secondary Insurance (if applicable): HUMANA   Treatment Diagnosis: R26.81 unsteadiness on feet, abnormalities of gait R26.89  No diagnosis found.   Medical Diagnosis:  Other abnormalities of gait and mobility [R26.89]  Other symptoms and signs involving the musculoskeletal system [R29.898]   Referring Physician: Leeanna Silva MD  PCP: Leeanna Silva MD     Plan of care signed (Y/N):     Date of Patient follow up with Physician:      Plan of Care Report: YES, Date Range for this report: 2024 to 2024  POC update due: (10 visits /OR AUTH LIMITS, whichever is less)  3/7/2025                                             Medical History:  Comorbidities:  Stroke/TIA  Other Neurological Conditions: dementia  Relevant Medical History: fall a few months ago resulting in pelvic and wrist fracture  L knee OA                                          Precautions/ Contra-indications:           Latex allergy:  NO  Pacemaker:    NO  Contraindications for Manipulation: None  Date of Surgery: n/a  Other:    Red Flags:  None    Suicide Screening:   The patient did not verbalize a primary behavioral concern, suicidal ideation, suicidal intent, or demonstrate suicidal behaviors.    Preferred Language for Healthcare:   [x] English       [] other:    SUBJECTIVE EXAMINATION     Patient stated complaint: Patient states she is doing well today. Has been having some increased pain in her L knee. The lady that

## 2025-02-20 ENCOUNTER — HOSPITAL ENCOUNTER (OUTPATIENT)
Dept: PHYSICAL THERAPY | Age: 85
Setting detail: THERAPIES SERIES
Discharge: HOME | End: 2025-02-20
Payer: MEDICARE

## 2025-02-20 PROCEDURE — 97110 THERAPEUTIC EXERCISES: CPT

## 2025-02-20 PROCEDURE — 97140 MANUAL THERAPY 1/> REGIONS: CPT

## 2025-02-20 NOTE — FLOWSHEET NOTE
[facility] - Outpatient Rehabilitation and Therapy [address] [place] [address] office: [phone] fax: [phone]          Physical Therapy: TREATMENT/PROGRESS NOTE   Patient: Bibiana Ayala (84 y.o. female)   Examination Date: 2025   :  1940 MRN: [id number]   Visit #: 15   Insurance Allowable Auth Needed   Medicare/Humana []Yes    []No    Insurance: Payor: MEDICARE / Plan: MEDICARE PART A AND B / Product Type: *No Product type* /   Insurance ID: [id number] - (Medicare)  Secondary Insurance (if applicable): HUMANA   Treatment Diagnosis: R26.81 unsteadiness on feet, abnormalities of gait R26.89  No diagnosis found.   Medical Diagnosis:  Other abnormalities of gait and mobility [R26.89]  Other symptoms and signs involving the musculoskeletal system [R29.898]   Referring Physician: Leeanna Silva MD  PCP: Leeanna Silva MD     Plan of care signed (Y/N):     Date of Patient follow up with Physician:      Plan of Care Report: YES, Date Range for this report: 2024 to 2024  POC update due: (10 visits /OR AUTH LIMITS, whichever is less)  3/21/2025                                             Medical History:  Comorbidities:  Stroke/TIA  Other Neurological Conditions: dementia  Relevant Medical History: fall a few months ago resulting in pelvic and wrist fracture  L knee OA                                          Precautions/ Contra-indications:           Latex allergy:  NO  Pacemaker:    NO  Contraindications for Manipulation: None  Date of Surgery: n/a  Other:    Red Flags:  None    Suicide Screening:   The patient did not verbalize a primary behavioral concern, suicidal ideation, suicidal intent, or demonstrate suicidal behaviors.    Preferred Language for Healthcare:   [x] English       [] other:    SUBJECTIVE EXAMINATION     Patient stated complaint: Patient states she is doing well today. No c/o pain with her L knee. Reports that the lady who does her

## 2025-03-03 ENCOUNTER — TELEPHONE (OUTPATIENT)
Dept: CARDIOLOGY CLINIC | Age: 85
End: 2025-03-03

## 2025-03-04 NOTE — TELEPHONE ENCOUNTER
Patient is scheduled for an appt with Dr. Walters on 3/11/25 in [facility].  Offered an appt this week and patient's son on HIPAA could not do that appt.

## 2025-03-06 ENCOUNTER — APPOINTMENT (OUTPATIENT)
Dept: PHYSICAL THERAPY | Age: 85
End: 2025-03-06
Payer: MEDICARE

## 2025-03-06 ENCOUNTER — HOSPITAL ENCOUNTER (OUTPATIENT)
Dept: PHYSICAL THERAPY | Age: 85
Setting detail: THERAPIES SERIES
Discharge: HOME | End: 2025-03-06
Payer: MEDICARE

## 2025-03-06 PROCEDURE — 97140 MANUAL THERAPY 1/> REGIONS: CPT

## 2025-03-06 PROCEDURE — 97110 THERAPEUTIC EXERCISES: CPT

## 2025-03-06 NOTE — PLAN OF CARE
this session    Education/Home Exercise Program: HEP discussed and performed, see exercise grid    Access Code: [email or web address]  URL: [email or web address]  Date: 02/06/2025  Prepared by: Elo Mace    Exercises  - Standing March with Counter Support  - 1 x daily - 7 x weekly - 2 sets - 10 reps  - Heel Raises with Counter Support  - 1 x daily - 7 x weekly - 2 sets - 10 reps  - Standing Hip Abduction with Counter Support  - 1 x daily - 7 x weekly - 2 sets - 10 reps  - Standing Hip Extension with Counter Support  - 1 x daily - 7 x weekly - 2 sets - 10 reps  - Standing Knee Flexion  - 1 x daily - 7 x weekly - 2 sets - 10 reps  - Seated Long Arc Quad  - 1 x daily - 7 x weekly - 2 sets - 10 reps  - Seated March  - 1 x daily - 7 x weekly - 2 sets - 10 reps  - Mini Squat with Counter Support  - 1 x daily - 7 x weekly - 2 sets - 10 reps  - Standing Tandem Balance with Counter Support  - 1 x daily - 7 x weekly - 2 sets - 10 reps  - Standing Single Leg Stance with Counter Support  - 1 x daily - 7 x weekly - 2 sets - 10 reps      ASSESSMENT     Today's Assessment: During therapy this date, patient required verbal cueing and progression of exercises and program for exercise progression, improving proper muscle recruitment and activation/motor control patterns, and static and dynamic balance.Patient will continue to benefit from ongoing evaluation and advanced clinical decision from a Physical Therapist to address and improve muscle strength, neuromuscular control, and balance and proprioception to safely return to PLOF without symptoms or restrictions. Patient has been seen for additional 5 visits since last progress note. Patient has been continuing to progress in overall strength and balance and was nearing ready to d/c from therapy. Patient however, has had 2 TIA attacks (per son) over the past few weeks. Will be having follow up with MD today and with cardiology next week. Upon arrival to therapy today, patient with mild

## 2025-03-10 NOTE — PROGRESS NOTES
(142 lb)   11/25/24 64.4 kg (142 lb)       Constitutional: Oriented. No distress.   Head: Normocephalic and atraumatic.   Mouth/Throat: Oropharynx is clear and moist.   Eyes: Conjunctivae normal. EOM are normal.   Neck: Neck supple. No rigidity. No JVD present.    Cardiovascular: Normal rate, regular rhythm, S1&S2.    Pulmonary/Chest: Bilateral respiratory sounds. No wheezes, No rhonchi.    Abdominal: Soft. Bowel sounds present. No distension, No tenderness.   Musculoskeletal: No tenderness. No edema    Lymphadenopathy: Has no cervical adenopathy.   Neurological: Alert and oriented. Cranial nerve appears intact, No Gross deficit   Skin: Skin is warm and dry. No rash noted.   Psychiatric: Has a normal behavior       Review of System:  [x] Full ROS obtained and negative except as mentioned in HPI    Prior to Admission medications    Medication Sig Start Date End Date Taking? Authorizing Provider   ELIQUIS 2.5 MG TABS tablet TAKE 1 TABLET BY MOUTH TWICE A DAY 1/13/25  Yes Damir Wyatt MD   Denosumab (PROLIA SC) Inject into the skin   Yes Provider, MD Sruthi   calcitRIOL (ROCALTROL) 0.25 MCG capsule Take 1 capsule by mouth daily   Yes Provider, Historical, MD   atorvastatin (LIPITOR) 40 MG tablet  9/6/23  Yes ProviderSruthi MD   COENZYME Q10 PO Take by mouth   Yes ProviderSruthi MD   galantamine (RAZADYNE ER) 8 MG extended release capsule  3/18/22  Yes Darrel, MD Sruthi   aspirin EC 81 MG EC tablet Take 1 tablet by mouth daily 7/7/21  Yes Damir Wyatt MD   Probiotic Product (PROBIOTIC-10) CAPS Take by mouth daily   Yes Provider, MD Sruthi   Multiple Vitamins-Minerals (THERAPEUTIC MULTIVITAMIN-MINERALS) tablet Take 1 tablet by mouth daily   Yes Provider, MD Sruthi   Omega-3 Fatty Acids (FISH OIL) 1200 MG CAPS Take 1 tablet by mouth daily.   Yes Sruthi Rojo MD   vitamin D (CHOLECALCIFEROL) 400 UNIT TABS tablet Take 5 tablets by mouth 2 times daily   Yes Provider

## 2025-03-11 ENCOUNTER — OFFICE VISIT (OUTPATIENT)
Dept: CARDIOLOGY CLINIC | Age: 85
End: 2025-03-11

## 2025-03-11 ENCOUNTER — TELEPHONE (OUTPATIENT)
Dept: CARDIOLOGY CLINIC | Age: 85
End: 2025-03-11

## 2025-03-11 VITALS
OXYGEN SATURATION: 96 % | WEIGHT: 144.8 LBS | HEART RATE: 82 BPM | SYSTOLIC BLOOD PRESSURE: 132 MMHG | DIASTOLIC BLOOD PRESSURE: 78 MMHG | BODY MASS INDEX: 26.65 KG/M2 | HEIGHT: 62 IN

## 2025-03-11 DIAGNOSIS — G45.9 TIA (TRANSIENT ISCHEMIC ATTACK): ICD-10-CM

## 2025-03-11 DIAGNOSIS — R55 SYNCOPE AND COLLAPSE: Primary | ICD-10-CM

## 2025-03-11 DIAGNOSIS — I10 HTN (HYPERTENSION), BENIGN: ICD-10-CM

## 2025-03-11 NOTE — TELEPHONE ENCOUNTER
Pre procedure instructions are located in the after visit summary. We discussed these instructions in detail prior to patient leaving appointment.     Procedure : LOOP

## 2025-03-11 NOTE — PATIENT INSTRUCTIONS
Ensure adequate hydration        Instructions for your Loop Recorder Implant and/or Removal     Our  will call you to discuss a date for you procedure within 5-10 business days     Bring a list of your medications.  Do not eat or drink anything for 4 hours prior to the procedure.  Take all morning medications the day of the procedure with a small amount of water.  Discharge instructions will be given to you at the time of your procedure.

## 2025-03-12 ENCOUNTER — TELEPHONE (OUTPATIENT)
Dept: CARDIOLOGY CLINIC | Age: 85
End: 2025-03-12

## 2025-03-12 NOTE — TELEPHONE ENCOUNTER
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   Please advise  Thank you

## 2025-03-20 ENCOUNTER — HOSPITAL ENCOUNTER (OUTPATIENT)
Dept: PHYSICAL THERAPY | Age: 85
Setting detail: THERAPIES SERIES
Discharge: HOME | End: 2025-03-20
Payer: MEDICARE

## 2025-03-20 PROCEDURE — 97140 MANUAL THERAPY 1/> REGIONS: CPT

## 2025-03-20 PROCEDURE — 97110 THERAPEUTIC EXERCISES: CPT

## 2025-03-20 NOTE — FLOWSHEET NOTE
[facility] - Outpatient Rehabilitation and Therapy [address] [place] [address] office: [phone] fax: [phone]              Physical Therapy: TREATMENT/PROGRESS NOTE   Patient: Bibiana Ayala (84 y.o. female)   Examination Date: 2025   :  1940 MRN: [id number]   Visit #: 17   Insurance Allowable Auth Needed   Medicare/Humana []Yes    []No    Insurance: Payor: MEDICARE / Plan: MEDICARE PART A AND B / Product Type: *No Product type* /   Insurance ID: [id number] - (Medicare)  Secondary Insurance (if applicable): HUMANA   Treatment Diagnosis: R26.81 unsteadiness on feet, abnormalities of gait R26.89  No diagnosis found.   Medical Diagnosis:  Other abnormalities of gait and mobility [R26.89]  Other symptoms and signs involving the musculoskeletal system [R29.898]   Referring Physician: Leeanna Silva MD  PCP: Leeanna Silva MD     Plan of care signed (Y/N):     Date of Patient follow up with Physician:      Plan of Care Report: YES, Date Range for this report: 2024 to 3/5/2025  POC update due: (10 visits /OR AUTH LIMITS, whichever is less)  2025                                             Medical History:  Comorbidities:  Stroke/TIA  Other Neurological Conditions: dementia  Relevant Medical History: fall a few months ago resulting in pelvic and wrist fracture  L knee OA                                          Precautions/ Contra-indications:           Latex allergy:  NO  Pacemaker:    NO  Contraindications for Manipulation: None  Date of Surgery: n/a  Other:    Red Flags:  None    Suicide Screening:   The patient did not verbalize a primary behavioral concern, suicidal ideation, suicidal intent, or demonstrate suicidal behaviors.    Preferred Language for Healthcare:   [x] English       [] other:    SUBJECTIVE EXAMINATION     Patient stated complaint: Patient reports that she has a knee sleeve which she has been using. Notes that she will be getting an

## 2025-03-20 NOTE — TELEPHONE ENCOUNTER
Called and spoke to the patients Son Eduardo and he will call me back to make sure that date is good to go and also wants to call the hospital to see if he can do all the registration over the phone since he is in [place]. He is her POA, Medical decision maker etc. Will wait to hear back before scheduling.

## 2025-03-21 PROBLEM — R55 SYNCOPE AND COLLAPSE: Status: ACTIVE | Noted: 2025-03-11

## 2025-03-21 NOTE — TELEPHONE ENCOUNTER
Procedure: LOOP IMPLANT    Date Of Procedure:  03/26/25    Time Of Arrival: 12PM    Procedure Time: 1PM       Called and spoke to patient and she is agreeable to the date and time. Reviewed the Pre-Procedure instructions and they verbalized understanding. Encouraged to call with any questions or concerns.       Published on [employer] / emailed to Cath lab / note in chart / scheduled in Epic/Cupid/Carto

## 2025-03-26 ENCOUNTER — HOSPITAL ENCOUNTER (OUTPATIENT)
Age: 85
Setting detail: OUTPATIENT SURGERY
Discharge: HOME | End: 2025-03-26
Attending: INTERNAL MEDICINE | Admitting: INTERNAL MEDICINE
Payer: MEDICARE

## 2025-03-26 VITALS
RESPIRATION RATE: 18 BRPM | BODY MASS INDEX: 26.5 KG/M2 | SYSTOLIC BLOOD PRESSURE: 150 MMHG | DIASTOLIC BLOOD PRESSURE: 82 MMHG | WEIGHT: 144 LBS | HEIGHT: 62 IN

## 2025-03-26 DIAGNOSIS — R55 SYNCOPE AND COLLAPSE: ICD-10-CM

## 2025-03-26 LAB — ECHO BSA: 1.69 M2

## 2025-03-26 PROCEDURE — 33285 INSJ SUBQ CAR RHYTHM MNTR: CPT | Performed by: INTERNAL MEDICINE

## 2025-03-26 PROCEDURE — C1764 EVENT RECORDER, CARDIAC: HCPCS | Performed by: INTERNAL MEDICINE

## 2025-03-26 PROCEDURE — 7100000010 HC PHASE II RECOVERY - FIRST 15 MIN: Performed by: INTERNAL MEDICINE

## 2025-03-26 PROCEDURE — 2709999900 HC NON-CHARGEABLE SUPPLY: Performed by: INTERNAL MEDICINE

## 2025-03-26 DEVICE — ICM LNQ22 LINQ II PRIME US
Type: IMPLANTABLE DEVICE | Status: FUNCTIONAL
Brand: LINQ II™

## 2025-03-26 RX ORDER — SODIUM CHLORIDE 9 MG/ML
INJECTION, SOLUTION INTRAVENOUS PRN
Status: DISCONTINUED | OUTPATIENT
Start: 2025-03-26 | End: 2025-03-26 | Stop reason: HOSPADM

## 2025-03-26 RX ORDER — SODIUM CHLORIDE 0.9 % (FLUSH) 0.9 %
5-40 SYRINGE (ML) INJECTION EVERY 12 HOURS SCHEDULED
Status: DISCONTINUED | OUTPATIENT
Start: 2025-03-26 | End: 2025-03-26 | Stop reason: HOSPADM

## 2025-03-26 RX ORDER — SODIUM CHLORIDE 0.9 % (FLUSH) 0.9 %
5-40 SYRINGE (ML) INJECTION PRN
Status: DISCONTINUED | OUTPATIENT
Start: 2025-03-26 | End: 2025-03-26 | Stop reason: HOSPADM

## 2025-03-26 NOTE — DISCHARGE INSTRUCTIONS
LINQ INSERTION DISCHARGE INSTRUCTIONS        Remove outer dressing in 48 hours. Leave steri strips intact.    Do not get the incision wet for one week.    You may be sore, bruised and have a small amount of drainage around the incision site.    Please contact the office if you notice any signs of infection:  Redness / swelling at the incision site  Fever  Yellow drainage at the site  Pain

## 2025-03-26 NOTE — PROGRESS NOTES
Patient/family given discharge instructions. Patient/family verbalize understanding of discharge instructions, all questions addressed, copy given to patient/family.  No complications noted. Pt transferred to vehicle via wheelchair to be discharged home with friend

## 2025-03-26 NOTE — PROCEDURES
PROCEDURE NOTE  Date: 3/26/2025   Name: Bibiana Ayala  YOB: 1940    Procedures  [facility]     Electrophysiology Procedure Note       Date of Procedure: 3/26/2025  Patient's Name: Bibiana Ayala  YOB: 1940   Medical Record Number: [id number]  Procedure Performed by: Patrick Walters MD    Procedures performed:    Loop recorder implantation      Indication of the procedure: Syncope, Palpitation   Bibiana Ayala is a 84 y.o. female with   syncope.      Patient complains of syncope after it.   Bradycardia may be responsible for his symptoms but has not been documented.        Details of procedure:   Procedure's risks, benefits and alternatives of procedure were explained to patient. All questions were answered. Patient understood and informed consent was obtained. The patient was brought to the electrophysiology lab in a fasting nonsedated state. Patient was prepped and draped in usual sterile fashion. After injection of 2% lidocaine in the left 4th intercostal area, a 5 mm small incision was made. Using ILR insertion device, a small subcutaneous tunnel was created and the loop recorder was inserted under skin. The skin was covered with Steri-Strips.     Blood loss<5 cc  No complications.  Device information:   The device is Reveal LINQ ll SN# [id number] [employer]onic      Implant date: 3/26/2025  R wave [id number]  The device was programmed to detect:   VT: 380 ms 16 beats   Bradycardia: 2000 ms     Plan:   The patient will have usual post-implant care. Patient can be discharged home if remains stable with follow up in arrhythmia clinic.

## 2025-03-27 NOTE — PROGRESS NOTES
Patient comes in for their 1 week wound check S/p Medtronic reveal LINQ II™ [id number] Implanted on 3/26/2025 with Dr. Walters for Syncope.     Patients incision is healing nicely. Patient and family educated on wound care. All questions answered. Patient to call the office immediately with any signs on infection.     Carelink Interrogation shows the Battery Status is GOOD. I symptom recordings from implant that shows ectopy. 0.3% PVC burden. Implanted for Syncope/palpitations. Patient remains on Eliquis. Turned AF to ALL and reviewed when/how to use Patient Symptom Activator. Please see interrogation scanned under media tab for more detail.      Patient will followed in office as scheduled on 7/2/2025 with NPRJ and we will continue to follow the Patient remotely.

## 2025-04-03 ENCOUNTER — HOSPITAL ENCOUNTER (OUTPATIENT)
Dept: PHYSICAL THERAPY | Age: 85
Setting detail: THERAPIES SERIES
Discharge: HOME | End: 2025-04-03
Payer: MEDICARE

## 2025-04-03 PROCEDURE — 97140 MANUAL THERAPY 1/> REGIONS: CPT

## 2025-04-03 PROCEDURE — 97110 THERAPEUTIC EXERCISES: CPT

## 2025-04-03 NOTE — FLOWSHEET NOTE
services to address the deficits outlined in the patients goals  The patient has a musculoskeletal condition(s) with a corresponding ICD-10 code that is of complexity and severity that require skilled therapeutic intervention. This has a direct and significant impact on the need for therapy and significantly impacts the rate of recovery.   The patient had a prior episode of outpatient therapy during this calendar year for a different condition.  Current diagnosis requires skilled therapeutic intervention.    Return to Play: NA    Prognosis for POC: [x] Good [] Fair  [] Poor    Patient requires continued skilled intervention: [x] Yes  [] No      CHARGE CAPTURE     PT CHARGE GRID   CPT Code (TIMED) minutes # CPT Code (UNTIMED) #     Therex (72485)  24 2  EVAL:LOW (98900 - Typically 20 minutes face-to-face)     Neuromusc. Re-ed (80000)    Re-Eval (74961)     Manual (31230) 15 1  Estim Unattended (32433)     Ther. Act (43222)    Mech. Traction (05079)     Gait (52617)    Dry Needle 1-2 muscle (99749)     Aquatic Therex (57106)    Dry Needle 3+ muscle (20561)     Iontophoresis (10091)    VASO (30203)     Ultrasound (30966)    Group Therapy (08492)     Estim Attended (03957)    Canalith Repositioning (05767)     Other:    Other:    Total Timed Code Tx Minutes 39 3       Total Treatment Minutes 39        Charge Justification:  (23203) THERAPEUTIC EXERCISE - Provided verbal/tactile cueing for HEP and/or activities related to strengthening, flexibility, endurance, ROM performed to prevent loss of range of motion, maintain or improve muscular strength or increase flexibility, following either an injury or surgery.   (02534) MANUAL THERAPY -  Manual therapy techniques, 1 or more regions, each 15 minutes (Mobilization/manipulation, manual lymphatic drainage, manual traction) for the purpose of modulating pain, promoting relaxation,  increasing ROM, reducing/eliminating soft tissue swelling/inflammation/restriction, improving

## 2025-04-07 ENCOUNTER — CLINICAL SUPPORT (OUTPATIENT)
Dept: CARDIOLOGY CLINIC | Age: 85
End: 2025-04-07

## 2025-04-07 NOTE — PATIENT INSTRUCTIONS
to ensure successful automatic transmissions in the future.     Please be aware that the remote device transmission sites are periodically monitored only during regular business hours during which simultaneous in-office device clinics are being conducted. If your transmission requires attention, we will contact you as soon as possible.     Your in-home monitor will do a full report on your device every 3 months (recurring appointments that run parallel to in office checks). You do not need to initiate a transmission or be awake at the appointment time listed - this is solely for office purposes.     Our office utilizes the \"no news is good news\" narrative regarding remote monitoring. A Device Specialist or RN will contact you with any critical findings from your remote monitoring.     Going forward, if you experience issues with your in-home monitor, please verify that it is plugged in to the wall. If issues persist, please contact the Customer Service numbers provided below, as they can troubleshoot issues that may be happening with the monitor itself. The Device Clinic works closely with the remote monitoring websites - if we notice there is a disconnection, we will contact you to inform you and ask you to contact the  of your device.     [employer] (Carelink)[phone]  [employer] ([facility])[phone]  [employer] ([place])[phone]  [phone]

## 2025-04-17 ENCOUNTER — HOSPITAL ENCOUNTER (OUTPATIENT)
Dept: PHYSICAL THERAPY | Age: 85
Setting detail: THERAPIES SERIES
Discharge: HOME | End: 2025-04-17
Payer: MEDICARE

## 2025-04-17 PROCEDURE — 97110 THERAPEUTIC EXERCISES: CPT

## 2025-04-17 PROCEDURE — 97112 NEUROMUSCULAR REEDUCATION: CPT

## 2025-04-17 PROCEDURE — 97140 MANUAL THERAPY 1/> REGIONS: CPT

## 2025-04-17 NOTE — PLAN OF CARE
CPT Code (TIMED) minutes # CPT Code (UNTIMED) #     Therex (44374)  20 1  EVAL:LOW (71558 - Typically 20 minutes face-to-face)     Neuromusc. Re-ed (83850)    Re-Eval (65390)     Manual (22347) 15 1  Estim Unattended (28778)     Ther. Act (67931)    Mech. Traction (78275)     Gait (24074)    Dry Needle 1-2 muscle (46959)     Aquatic Therex (33852)    Dry Needle 3+ muscle (20561)     Iontophoresis (25923)    VASO (64290)     Ultrasound (59695)    Group Therapy (35687)     Estim Attended (08847)    Canalith Repositioning (61387)     Other:    Other:    Total Timed Code Tx Minutes 35 2       Total Treatment Minutes 35        Charge Justification:  (25246) THERAPEUTIC EXERCISE - Provided verbal/tactile cueing for HEP and/or activities related to strengthening, flexibility, endurance, ROM performed to prevent loss of range of motion, maintain or improve muscular strength or increase flexibility, following either an injury or surgery.   (28628) NEUROMUSCULAR RE-EDUCATION - Provided therapeutic procedure on activities related to neuromuscular reeducation of movement, balance, coordination, kinesthetic sense, posture, and/or proprioception for sitting and/or standing activities. Provided HEP review and/or progression.    GOALS     Patient stated goal: improve balance  [] Progressing: [x] Met: [] Not Met: [] Adjusted    Therapist goals for Patient:   Short Term Goals: To be achieved in: 2 weeks  1. Independent in HEP and progression per patient tolerance, in order to prevent re-injury.   [] Progressing: [x] Met: [] Not Met: [] Adjusted  2. Patient will have a decrease in pain to <0/10 to facilitate improvement in movement, function, and ADLs as indicated by Functional Deficits.  [] Progressing: [x] Met: [] Not Met: [] Adjusted    Long Term Goals: To be achieved in: 12 weeks  1. Disability index score of 48/56 or better for the Box Balance Scale to assist with reaching prior level of function with activities such as

## 2025-04-23 PROCEDURE — 93298 REM INTERROG DEV EVAL SCRMS: CPT | Performed by: INTERNAL MEDICINE

## 2025-05-13 ENCOUNTER — TELEPHONE (OUTPATIENT)
Dept: CARDIOLOGY CLINIC | Age: 85
End: 2025-05-13

## 2025-05-13 NOTE — TELEPHONE ENCOUNTER
We received remote transmission from patient's monitor at home at 5:43 am this morning. Transmission shows normal sensing and pacing function.   Current ECG shows Sinus  No epsidoes noted yesterday on Loop Recorder.    Please advise. Thanks

## 2025-05-13 NOTE — TELEPHONE ENCOUNTER
Son states pt had an episode of un conscience yesterday between 4:45 and 5:10 pm. Pt has a loop recorder implanted, but pt has thrown away remote. Son is calling [employer] to get that replaced, but asking for a call back to advise.

## 2025-05-13 NOTE — TELEPHONE ENCOUNTER
Spoke to son (on HIPAA), he is having problems attaining a replacement remote from [employer].    Is there another way to get a replacement remote?    Please advise.    I spoke with pt's son and relayed result per TMDT. Son verbalized understanding.

## 2025-05-14 NOTE — TELEPHONE ENCOUNTER
Spoke to son, Eduardo he will pick remote up at [place] on 5/22 when pt is there seeing LES. Joi has arranged for remote to be sent to [place].

## 2025-05-21 NOTE — PROGRESS NOTES
tobacco smoke. She has never used smokeless tobacco. She reports that she does not drink alcohol and does not use drugs.     Family History:  family history includes Arthritis in her mother; Asthma in her father; Depression in her mother; Heart Disease in her brother, father, and sister; Stroke in her mother.     Home medications:    Current Outpatient Medications:     ELIQUIS 2.5 MG TABS tablet, TAKE 1 TABLET BY MOUTH TWICE A DAY, Disp: 180 tablet, Rfl: 3    Denosumab (PROLIA SC), Inject into the skin, Disp: , Rfl:     calcitRIOL (ROCALTROL) 0.25 MCG capsule, Take 1 capsule by mouth daily, Disp: , Rfl:     atorvastatin (LIPITOR) 40 MG tablet, , Disp: , Rfl:     COENZYME Q10 PO, Take by mouth, Disp: , Rfl:     galantamine (RAZADYNE ER) 8 MG extended release capsule, , Disp: , Rfl:     aspirin EC 81 MG EC tablet, Take 1 tablet by mouth daily, Disp: 90 tablet, Rfl: 1    Probiotic Product (PROBIOTIC-10) CAPS, Take by mouth daily, Disp: , Rfl:     Multiple Vitamins-Minerals (THERAPEUTIC MULTIVITAMIN-MINERALS) tablet, Take 1 tablet by mouth daily, Disp: , Rfl:     Omega-3 Fatty Acids (FISH OIL) 1200 MG CAPS, Take 1 tablet by mouth daily., Disp: , Rfl:     vitamin D (CHOLECALCIFEROL) 400 UNIT TABS tablet, Take 5 tablets by mouth 2 times daily, Disp: , Rfl:         Allergies:  Flagyl [metronidazole], Omnicef, Cephalexin, and Penicillins     Review of Systems:   Constitutional: there has been no unanticipated weight loss. There's been no change in energy level, sleep pattern, or activity level.     Eyes: No visual changes or diplopia. No scleral icterus.  ENT: No Headaches, hearing loss or vertigo. No mouth sores or sore throat.  Cardiovascular: Reviewed in HPI  Respiratory: No cough or wheezing, no sputum production. No hematemesis.    Gastrointestinal: No abdominal pain, appetite loss, blood in stools. No change in bowel or bladder habits.  Genitourinary: No dysuria, trouble voiding, or hematuria.  Musculoskeletal:  No

## 2025-05-22 ENCOUNTER — OFFICE VISIT (OUTPATIENT)
Dept: CARDIOLOGY CLINIC | Age: 85
End: 2025-05-22
Payer: MEDICARE

## 2025-05-22 VITALS
BODY MASS INDEX: 27.33 KG/M2 | HEART RATE: 87 BPM | DIASTOLIC BLOOD PRESSURE: 70 MMHG | WEIGHT: 148.5 LBS | SYSTOLIC BLOOD PRESSURE: 132 MMHG | OXYGEN SATURATION: 97 % | HEIGHT: 62 IN

## 2025-05-22 DIAGNOSIS — D68.51 FACTOR V LEIDEN: ICD-10-CM

## 2025-05-22 DIAGNOSIS — I65.23 BILATERAL CAROTID ARTERY STENOSIS: ICD-10-CM

## 2025-05-22 DIAGNOSIS — E78.2 MIXED HYPERLIPIDEMIA: Chronic | ICD-10-CM

## 2025-05-22 DIAGNOSIS — I10 HTN (HYPERTENSION), BENIGN: Primary | ICD-10-CM

## 2025-05-22 PROCEDURE — 1123F ACP DISCUSS/DSCN MKR DOCD: CPT | Performed by: INTERNAL MEDICINE

## 2025-05-22 PROCEDURE — G8427 DOCREV CUR MEDS BY ELIG CLIN: HCPCS | Performed by: INTERNAL MEDICINE

## 2025-05-22 PROCEDURE — G8419 CALC BMI OUT NRM PARAM NOF/U: HCPCS | Performed by: INTERNAL MEDICINE

## 2025-05-22 PROCEDURE — 1036F TOBACCO NON-USER: CPT | Performed by: INTERNAL MEDICINE

## 2025-05-22 PROCEDURE — 1159F MED LIST DOCD IN RCRD: CPT | Performed by: INTERNAL MEDICINE

## 2025-05-22 PROCEDURE — 99214 OFFICE O/P EST MOD 30 MIN: CPT | Performed by: INTERNAL MEDICINE

## 2025-05-22 PROCEDURE — 1090F PRES/ABSN URINE INCON ASSESS: CPT | Performed by: INTERNAL MEDICINE

## 2025-05-22 NOTE — PATIENT INSTRUCTIONS
No medication changes today     Recommend follow up with  Neurology, Dr. Hernández    Be sure to use your life alert if needed    Follow up with Dr. Wyatt in 6 months

## 2025-05-29 PROCEDURE — 93298 REM INTERROG DEV EVAL SCRMS: CPT | Performed by: INTERNAL MEDICINE

## 2025-06-11 ENCOUNTER — PATIENT MESSAGE (OUTPATIENT)
Dept: CARDIOLOGY CLINIC | Age: 85
End: 2025-06-11

## 2025-06-11 ENCOUNTER — TELEPHONE (OUTPATIENT)
Dept: EMERGENCY DEPT | Age: 85
End: 2025-06-11

## 2025-06-12 NOTE — TELEPHONE ENCOUNTER
No episodes noted on Loop from night of 6/11/2025. Short Atrial run noted on 6/10/2025 at 4:43 pm lasting 20 seconds. No other episodes noted since 5/23/2025.

## 2025-06-24 ENCOUNTER — TELEPHONE (OUTPATIENT)
Dept: CARDIOLOGY CLINIC | Age: 85
End: 2025-06-24

## 2025-06-24 NOTE — TELEPHONE ENCOUNTER
Eduardo called to reschedule mother appt as she has another emergency appt. The next available is to see MXA and son said no this is for her 6 mo fu. Son lives out of town. Please advise son available appt.

## 2025-07-03 PROCEDURE — 93298 REM INTERROG DEV EVAL SCRMS: CPT | Performed by: INTERNAL MEDICINE

## 2025-07-17 NOTE — PROGRESS NOTES
used smokeless tobacco. She reports that she does not drink alcohol and does not use drugs.     Family History:  Personally Reviewed. family history includes Arthritis in her mother; Asthma in her father; Depression in her mother; Heart Disease in her brother, father, mother, and sister; Stroke in her mother.   Denies family history of sudden cardiac death, arrhythmia, premature CAD    Review of Systems:  Constitutional: Negative for fever, night sweats, chills, weight changes, or weakness  Skin: Negative for rash, dry skin, pruritus, bruising, bleeding, blood clots, or changes in skin pigment  HEENT: Negative for vision changes, ringing in the ears, sore throat, dysphagia, or swollen lymph nodes  Respiratory: Reviewed in HPI  Cardiovascular: Reviewed in HPI  Gastrointestinal: Negative for abdominal pain, N/V/D, constipation, or black/tarry stools  Genito-Urinary: Negative for dysuria, incontinence, urgency, or hematuria  Musculoskeletal: Negative for joint swelling, muscle pain, or injuries  Neurological/Psych: Negative for confusion, seizures, dizziness, headaches, balance issues or TIA-like symptoms. No anxiety, depression, or insomnia    Physical Examination:  Vitals:    07/18/25 1416   BP: 98/60   Pulse: 87   SpO2: 93%      Wt Readings from Last 3 Encounters:   07/18/25 67.2 kg (148 lb 3.2 oz)   05/22/25 67.4 kg (148 lb 8 oz)   03/26/25 65.3 kg (144 lb)     Constitutional: Cooperative and in no apparent distress, and appears well nourished  Skin: Warm and pink; no pallor, cyanosis, bruising, or clubbing  HEENT: Symmetric and normocephalic. PERRL, EOM intact. Conjunctiva pink with clear sclera. Mucus membranes pink and moist. Teeth intact. Thyroid smooth without nodules or goiter  Respiratory: Respirations symmetric and unlabored. Lungs clear to auscultation bilaterally, no wheezing, rhonchi, or crackles  Cardiovascular:  Regular rate and rhythm. S1/S2 present without murmurs, rubs, or gallops. Peripheral pulses

## 2025-07-18 ENCOUNTER — OFFICE VISIT (OUTPATIENT)
Dept: CARDIOLOGY CLINIC | Age: 85
End: 2025-07-18

## 2025-07-18 ENCOUNTER — CLINICAL SUPPORT (OUTPATIENT)
Dept: CARDIOLOGY CLINIC | Age: 85
End: 2025-07-18

## 2025-07-18 VITALS
BODY MASS INDEX: 27.27 KG/M2 | HEIGHT: 62 IN | HEART RATE: 87 BPM | OXYGEN SATURATION: 93 % | SYSTOLIC BLOOD PRESSURE: 98 MMHG | DIASTOLIC BLOOD PRESSURE: 60 MMHG | WEIGHT: 148.2 LBS

## 2025-07-18 DIAGNOSIS — R55 SYNCOPE AND COLLAPSE: ICD-10-CM

## 2025-07-18 DIAGNOSIS — I65.23 BILATERAL CAROTID ARTERY STENOSIS: ICD-10-CM

## 2025-07-18 DIAGNOSIS — G45.9 TRANSIENT CEREBRAL ISCHEMIA, UNSPECIFIED TYPE: ICD-10-CM

## 2025-07-18 DIAGNOSIS — G45.9 TIA (TRANSIENT ISCHEMIC ATTACK): Primary | ICD-10-CM

## 2025-07-18 DIAGNOSIS — I10 HTN (HYPERTENSION), BENIGN: ICD-10-CM

## 2025-07-18 DIAGNOSIS — Z86.73 HISTORY OF TIA (TRANSIENT ISCHEMIC ATTACK) AND STROKE: ICD-10-CM

## 2025-07-18 RX ORDER — LEVETIRACETAM 500 MG/1
500 TABLET ORAL 2 TIMES DAILY
COMMUNITY
Start: 2025-06-17

## 2025-07-18 RX ORDER — LAMOTRIGINE 25 MG/1
25 TABLET ORAL DAILY
COMMUNITY
Start: 2025-07-15 | End: 2025-08-12

## 2025-07-18 RX ORDER — SERTRALINE HYDROCHLORIDE 25 MG/1
25 TABLET, FILM COATED ORAL DAILY
COMMUNITY
End: 2025-07-18 | Stop reason: DRUGHIGH

## 2025-07-18 NOTE — PROGRESS NOTES
Patient comes in for their OV with NPRJ today.   Patient has [employer] reveal LINQ II™ [id number] Implanted on 3/26/2025 with Dr. Walters for Syncope.     [employer] Interrogation shows the Battery Status is GOOD. Since 6/12/2025 no episodes noted. 2 prior Tachy episodes noted that appear to be ST vs AT. 1 prior symptom recording noted from implant that shows ectopy. 0.3% PVC burden. Implanted for Syncope/palpitations. Patient remains on Eliquis. Please see interrogation scanned under media tab for more detail. We will continue to follow the Patient remotely.

## 2025-07-31 ENCOUNTER — OFFICE VISIT (OUTPATIENT)
Dept: CARDIOLOGY CLINIC | Age: 85
End: 2025-07-31
Payer: MEDICARE

## 2025-07-31 VITALS
OXYGEN SATURATION: 96 % | WEIGHT: 147.5 LBS | HEIGHT: 62 IN | BODY MASS INDEX: 27.14 KG/M2 | SYSTOLIC BLOOD PRESSURE: 108 MMHG | DIASTOLIC BLOOD PRESSURE: 62 MMHG | HEART RATE: 72 BPM

## 2025-07-31 DIAGNOSIS — R55 SYNCOPE AND COLLAPSE: Primary | ICD-10-CM

## 2025-07-31 PROCEDURE — 1159F MED LIST DOCD IN RCRD: CPT | Performed by: INTERNAL MEDICINE

## 2025-07-31 PROCEDURE — 99214 OFFICE O/P EST MOD 30 MIN: CPT | Performed by: INTERNAL MEDICINE

## 2025-07-31 PROCEDURE — 1036F TOBACCO NON-USER: CPT | Performed by: INTERNAL MEDICINE

## 2025-07-31 PROCEDURE — 1090F PRES/ABSN URINE INCON ASSESS: CPT | Performed by: INTERNAL MEDICINE

## 2025-07-31 PROCEDURE — G8427 DOCREV CUR MEDS BY ELIG CLIN: HCPCS | Performed by: INTERNAL MEDICINE

## 2025-07-31 PROCEDURE — G8419 CALC BMI OUT NRM PARAM NOF/U: HCPCS | Performed by: INTERNAL MEDICINE

## 2025-07-31 PROCEDURE — 1123F ACP DISCUSS/DSCN MKR DOCD: CPT | Performed by: INTERNAL MEDICINE

## 2025-07-31 RX ORDER — SERTRALINE HYDROCHLORIDE 100 MG/1
TABLET, FILM COATED ORAL
COMMUNITY
Start: 2025-07-28

## 2025-07-31 RX ORDER — ROPINIROLE 1 MG/1
TABLET, FILM COATED ORAL
COMMUNITY
Start: 2025-06-14

## 2025-07-31 NOTE — PATIENT INSTRUCTIONS
No medication changes today     Send a device remote transmission tomorrow or Monday    Follow up with Dr. Wyatt in 3 months   
Feeding and  care were discussed today and parent questions were answered

## 2025-07-31 NOTE — PROGRESS NOTES
Cardiac Follow Up    Referring Provider:  Leeanna Silva MD       Chief Complaint   Patient presents with    Follow-Up from Hospital    Hyperlipidemia    Hypertension     History of Present Illness:  Bibiana is an 84 y.o. female. 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 Kennedy in hematology. She also has a history of TIA, and hyperlipidemia.  In 1996, she had an episode of blurry eye sight with hand numbness that was diagnosed as a complicated migraine. She had an echo with bubble study April 4, 2014 - LV normal with LVEF 55-60%, diastolic dysfunction, trace MR, and moderate TR.  Bibiana was admitted to [facility] 10/30/14 with slurred speech, difficulty finding words, and transient headaches. She was transferred to [facility] and evaluated by oncology, neurology, and cardiology.  She thinks the cause for symptoms was likely because of sub therapeutic INR and TIA.  . She had an echo with bubble study 1/18/2016 that did not document an ASD.   In 2015 she fell and fractured her hip and had surgery.  Bibiana reports having TIA's from time to time. In June 2016, she was on the phone with her son who lives in [facility], and she couldn't put two words together. She ended up being admitted to [facility] for three days with a TIA.  She was on coumadin at the time with therapeutic INR's. Her neurologist is Dr. Soni.  ARIN done in 12/27/16 showed no evidence of shunting, mild MR.Due to failure of coumadin she was switched to xarelto.  Stopped Xarelto and changed to Eliquis 2.5mg bid due to change in Kidney function. - Kidney function much improved.   Was seen at [facility] in July 2021 for syncopal episode witnessed by her son. Her workup was unremarkable. Likely TIA, no residual effects. She has since been seen for memory loss.     She is active with her [employer], she sings and she participates in [employer] study. She has a weekly hair appointment and still drives

## 2025-08-01 ENCOUNTER — TELEPHONE (OUTPATIENT)
Dept: CARDIOLOGY CLINIC | Age: 85
End: 2025-08-01

## 2025-08-01 NOTE — TELEPHONE ENCOUNTER
Heather states a transmission for pt loop recorder was sent over a few minutes ago and wanted to make sure the office was able to see that.

## (undated) DEVICE — PACEMAKER PACK: Brand: MEDLINE INDUSTRIES, INC.

## (undated) DEVICE — RESTRAINT EXT ANK WRST LT BLU FOAM 2 STRP SIDE BCKL HK AND

## (undated) DEVICE — PAD, DEFIB, ADULT, RADIOTRANS, PHYSIO: Brand: MEDLINE

## (undated) DEVICE — PROBE COVER KIT: Brand: MEDLINE INDUSTRIES, INC.

## (undated) DEVICE — STRIP,CLOSURE,WOUND,MEDI-STRIP,1/2X4: Brand: MEDLINE

## (undated) DEVICE — MASIMOSET LNCS ADTX SPO2 ADULT PULSE OXIMETER ADHESIVE SENSOR: Brand: MASIMOSET® LNCS® ADTX SPO2 ADULT PULSE OXIMETER ADHESIVE SENSOR

## (undated) DEVICE — COVER LT HNDL UNIV FOR LNG HNDL KOVER 1 PER PK

## (undated) DEVICE — Device: Brand: NOMOLINE™ LH ADULT NASAL CO2 CANNULA WITH O2 4M

## (undated) DEVICE — ELECTRODE PT RET AD L9FT HI MOIST COND ADH HYDRGEL CORDED